# Patient Record
Sex: MALE | Race: WHITE | NOT HISPANIC OR LATINO | Employment: OTHER | URBAN - METROPOLITAN AREA
[De-identification: names, ages, dates, MRNs, and addresses within clinical notes are randomized per-mention and may not be internally consistent; named-entity substitution may affect disease eponyms.]

---

## 2018-12-17 ENCOUNTER — RECORDS - HEALTHEAST (OUTPATIENT)
Dept: LAB | Facility: HOSPITAL | Age: 83
End: 2018-12-17

## 2018-12-17 ENCOUNTER — RECORDS - HEALTHEAST (OUTPATIENT)
Dept: ADMINISTRATIVE | Facility: OTHER | Age: 83
End: 2018-12-17

## 2018-12-17 LAB
TSH SERPL DL<=0.005 MIU/L-ACNC: 1.17 UIU/ML (ref 0.3–5)
VIT B12 SERPL-MCNC: 456 PG/ML (ref 213–816)

## 2018-12-19 ENCOUNTER — RECORDS - HEALTHEAST (OUTPATIENT)
Dept: ADMINISTRATIVE | Facility: OTHER | Age: 83
End: 2018-12-19

## 2018-12-20 ENCOUNTER — AMBULATORY - HEALTHEAST (OUTPATIENT)
Dept: CARDIOLOGY | Facility: CLINIC | Age: 83
End: 2018-12-20

## 2018-12-20 LAB — METHYLMALONATE SERPL-SCNC: 0.2 UMOL/L (ref 0–0.4)

## 2019-01-16 ENCOUNTER — HOSPITAL ENCOUNTER (OUTPATIENT)
Dept: RADIOLOGY | Facility: HOSPITAL | Age: 84
Discharge: HOME | End: 2019-01-16
Attending: PSYCHIATRY & NEUROLOGY

## 2019-01-16 ENCOUNTER — HOSPITAL ENCOUNTER (OUTPATIENT)
Dept: MRI IMAGING | Facility: HOSPITAL | Age: 84
Discharge: HOME | End: 2019-01-16
Attending: INTERNAL MEDICINE

## 2019-01-16 ENCOUNTER — HOSPITAL ENCOUNTER (OUTPATIENT)
Dept: MRI IMAGING | Facility: HOSPITAL | Age: 84
Discharge: HOME | End: 2019-01-16
Attending: PSYCHIATRY & NEUROLOGY

## 2019-01-16 DIAGNOSIS — G47.52 REM SLEEP BEHAVIOR DISORDER: ICD-10-CM

## 2019-01-16 DIAGNOSIS — G62.89 AXONAL NEUROPATHY: ICD-10-CM

## 2019-01-16 DIAGNOSIS — G30.9 ALZHEIMER'S DEMENTIA (H): ICD-10-CM

## 2019-01-16 DIAGNOSIS — F02.80 ALZHEIMER'S DEMENTIA (H): ICD-10-CM

## 2020-06-04 PROBLEM — G62.89 AXONAL NEUROPATHY: Status: ACTIVE | Noted: 2020-06-04

## 2020-06-04 PROBLEM — G47.52 REM SLEEP BEHAVIOR DISORDER: Status: ACTIVE | Noted: 2020-06-04

## 2020-06-04 PROBLEM — G30.9 ALZHEIMER'S DISEASE (H): Status: ACTIVE | Noted: 2020-06-04

## 2020-06-04 PROBLEM — F02.80 ALZHEIMER'S DISEASE (H): Status: ACTIVE | Noted: 2020-06-04

## 2020-06-05 ENCOUNTER — OFFICE VISIT (OUTPATIENT)
Dept: NEUROLOGY | Facility: CLINIC | Age: 85
End: 2020-06-05
Payer: MEDICARE

## 2020-06-05 VITALS — HEIGHT: 73 IN | BODY MASS INDEX: 24.52 KG/M2 | WEIGHT: 185 LBS

## 2020-06-05 DIAGNOSIS — G62.89 AXONAL NEUROPATHY: Primary | ICD-10-CM

## 2020-06-05 DIAGNOSIS — G30.1 LATE ONSET ALZHEIMER'S DISEASE WITHOUT BEHAVIORAL DISTURBANCE (H): ICD-10-CM

## 2020-06-05 DIAGNOSIS — F02.80 LATE ONSET ALZHEIMER'S DISEASE WITHOUT BEHAVIORAL DISTURBANCE (H): ICD-10-CM

## 2020-06-05 PROBLEM — I95.1 ORTHOSTATIC HYPOTENSION: Status: ACTIVE | Noted: 2019-08-27

## 2020-06-05 PROBLEM — C34.92: Status: ACTIVE | Noted: 2017-07-19

## 2020-06-05 PROBLEM — I44.39 HIGH-GRADE ATRIOVENTRICULAR BLOCK: Status: ACTIVE | Noted: 2018-08-06

## 2020-06-05 PROBLEM — R00.1 BRADYCARDIA: Status: ACTIVE | Noted: 2020-06-05

## 2020-06-05 PROBLEM — R26.9 GAIT DISTURBANCE: Status: ACTIVE | Noted: 2019-08-26

## 2020-06-05 PROBLEM — R59.0 LYMPHADENOPATHY, THORACIC: Status: ACTIVE | Noted: 2018-06-21

## 2020-06-05 PROBLEM — R41.9 COGNITIVE COMPLAINTS: Status: ACTIVE | Noted: 2018-02-21

## 2020-06-05 PROBLEM — R56.9: Status: ACTIVE | Noted: 2020-06-05

## 2020-06-05 PROBLEM — Z95.0 PACEMAKER: Status: ACTIVE | Noted: 2017-12-26

## 2020-06-05 PROBLEM — R07.9 CHEST PAIN: Status: ACTIVE | Noted: 2018-02-21
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
## 2020-06-05 PROBLEM — R42 DIZZINESS: Status: ACTIVE | Noted: 2019-08-27

## 2020-06-05 PROBLEM — R55 SYNCOPE AND COLLAPSE: Status: ACTIVE | Noted: 2017-06-16

## 2020-06-05 PROBLEM — I48.91 ATRIAL FIBRILLATION (H): Status: ACTIVE | Noted: 2018-04-03

## 2020-06-05 PROBLEM — R91.8 LUNG NODULE, MULTIPLE: Status: ACTIVE | Noted: 2018-06-21

## 2020-06-05 PROCEDURE — 99214 OFFICE O/P EST MOD 30 MIN: CPT | Mod: 95 | Performed by: PSYCHIATRY & NEUROLOGY

## 2020-06-05 RX ORDER — PREGABALIN 100 MG/1
100 CAPSULE ORAL 3 TIMES DAILY
COMMUNITY
Start: 2019-05-22 | End: 2020-06-05

## 2020-06-05 RX ORDER — DONEPEZIL HYDROCHLORIDE 10 MG/1
10 TABLET, FILM COATED ORAL DAILY
COMMUNITY
Start: 2019-02-06 | End: 2020-06-05

## 2020-06-05 RX ORDER — DONEPEZIL HYDROCHLORIDE 10 MG/1
10 TABLET, FILM COATED ORAL DAILY
Qty: 90 TABLET | Refills: 3 | Status: SHIPPED | OUTPATIENT
Start: 2020-06-05 | End: 2021-05-10

## 2020-06-05 RX ORDER — PHENOL 1.4 %
10 AEROSOL, SPRAY (ML) MUCOUS MEMBRANE AT BEDTIME
COMMUNITY
End: 2023-01-01

## 2020-06-05 RX ORDER — SODIUM PHOSPHATE,MONO-DIBASIC 19G-7G/118
1 ENEMA (ML) RECTAL 2 TIMES DAILY
COMMUNITY
End: 2020-06-05

## 2020-06-05 RX ORDER — METOPROLOL TARTRATE 25 MG/1
25 TABLET, FILM COATED ORAL 2 TIMES DAILY
COMMUNITY
Start: 2020-01-22 | End: 2023-01-01

## 2020-06-05 RX ORDER — PREGABALIN 100 MG/1
100 CAPSULE ORAL 3 TIMES DAILY
Qty: 270 CAPSULE | Refills: 3 | Status: SHIPPED | OUTPATIENT
Start: 2020-06-05 | End: 2023-01-01

## 2020-06-05 RX ORDER — DORZOLAMIDE HYDROCHLORIDE AND TIMOLOL MALEATE 20; 5 MG/ML; MG/ML
1 SOLUTION/ DROPS OPHTHALMIC 2 TIMES DAILY
COMMUNITY

## 2020-06-05 ASSESSMENT — MIFFLIN-ST. JEOR: SCORE: 1588.03

## 2020-06-05 NOTE — NURSING NOTE
Chief Complaint   Patient presents with     Follow Up     6 month follow up, some new memory concerns (states difficult to remember medications and when to take them)      Video visit [phone]  Loki Jaime on 6/5/2020 at 9:24 AM

## 2020-06-05 NOTE — PROGRESS NOTES
"Video follow-up requested by patient video follow-up performed due to the COVID 19 pandemic  Patient identified     Telephone number  [phone]      .Patient is being evaluated via a billable video visit. The patient has been notified of following:     \"This video visit will be conducted via a call between you and your physician/provider. We have found that certain health care needs can be provided without the need for an in-person physical exam. This service lets us provide the care you need with a video conversation. If a prescription is necessary we can send it directly to your pharmacy. If lab work is needed we can place an order for that and you can then stop by our lab to have the test done at a later time.    If during the course of the call the physician/provider feels a video visit is not appropriate, you will not be charged for this service.    Physician has received verbal consent for a Video Visit from the patient? YES    Patient would like the video invitation sent by: Text [phone]    Sina      Video Visit Details    Type of service: Video Visit    Video Start Time: 9:40 AM    Video End Time (time video stopped): 10:05 AM    Originating Location (pt. Location): Patient's Home    Distant Location (provider location): [facility] [place]     Mode of Communication: Video Conference via dotHIV  /  Sina                  History of Present Illness     85-year-old here for follow-up of his memory difficulties     Patient has difficulty with neuropathy  It affects his gait and balance was reviewed at length about safety  Still ambulating without a cane  Is up out of the chair okay  Is carrying groceries up and down stairs with a do think is a good idea and we reviewed    Does have difficulty with dysesthesias and restless leg type symptoms which make it difficult to sleep much better with the Lyrica      Memory is about the same there is little bit about current events " but is sketchy on a lot of the details    No other major hospitalizations surgeries or illnesses since last seen    Does have some missing teeth which makes chewing difficult but no swallowing problems      Major difficulties today  When he standing in [employer] he gets a little lightheaded prior little bit of autonomic instability discussed with patient and family members    On Lyrica 100 mg 3 times daily for his neuropathy would not go up any higher on this    Otherwise relatively stable    History of memory fall off as far back as [id number]  Originally evaluated by Dr. 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 Lucia Beckford at [facility]  History of subdural hematoma from falling backwards in   Possible EEG in the past showing a right frontal slowing mild with possible right frontal sharp wave  Diagnosed with lung cancer in 2017    Past medical history  1. Onset of dementia cognitive difficulties as far back as   2. Diagnosed with dementia and   3. Axonal loss neuropathy going on for quite a few years more painful burning feet  4. Benign prostatic hypertrophy  5. Lung cancer 2017  6. Subdural hematoma     Family history  Sister with schizophrenia memory loss onset age 60s memory care unit  Another sister  of cancer  2 sisters  possibly of cancer is not sure  Brother with throat cancer  Dad with pneumonia  at age 55  Mother  in her 50s early 60s with heart attack next    Habits  Past smoker may have a rare alcoholic beverage          On today's visit we reviewed his neuropathy he has burning dysesthesias I do not think we should add any new medications he is switching his care to our service is already on Lyrica 100 mg 3 times daily I would not change that I would not go up on the dose if he is having side effects in the future for the meds we would have to decrease  the dose        He does exercise and finds that it makes his feet feel better this is good for his dementia though also he rides the exercise bike but then his feet get hot puts them in cold water but only for a few minutes warned him about frostbite or over freezing the feet which could make things worse he can use counter stimulation such as a stocking or massage to help if there are pins-and-needles but for the temperature changes be careful if he is using the cold water    Review of Systems   Patient is impulsive  He  uncomfortable sensations in his legs  Hearing loss on the left 70%    No diplopia no dysarthria no dysphasia  No focal weakness    Has some mild imbalance difficulties from his neuropathy and numb feet    No bowel or bladder complaints today but has had difficulty with prostate hypertrophy    No GERD no nausea vomiting no diarrhea    Has had some constipation in the past tries a well-hydrated    No actual syncopal episodes or lightheadedness    Denies headache chest pain shortness of breath nausea vomiting    The REM sleep changes are rare vivid dreams but no true hallucinations        Physical examination  Alert attentive  HEENT significant decreased hearing  Breathing well appears comfortable  Abdomen soft  No edema the feet    Neurologic exam  Alert and attentive  Year is 2019, then changed to 2018, then changed to 2020 he has answer questions quickly and is impulsive  Month is June  President is Milo Thorne  He knows something about the news he called the writers Sonia and they were doing moving easily with vague on some details though    Previous [place] score of 12 out of 30    Does okay with activities of daily living per his wife  Helps out more in the kitchen he does have his own checkbook but she monitors that    He is not driving at this time    Cranial 2 through 12 significant for  Marked decreased hearing on the left    No ophthalmoplegia no nystagmus face is symmetrical tongue  twisters are okay but he talks with an accent and is missing some teeth    Upper extremities  No drift  No tremor  Normal finger-nose    Gait and lower extremities  Gets out of the chair uses one-handed pushoff but gets up fairly quickly  Ambulates around the room without an assistive device  No specific gait apraxia                  Impression plan    Dementia on the Aricept no further autonomic problems or fainting continue with current dose    Neuropathy better with current medications    Follow-up in October or late fall 2020    No driving    Aricept 10 mg once per day    Lyrica 100 mg 3 times daily    Talked about good foot care    Follow-up in October 2020    29 his total care time today greater than 50% face-to-face discussion about multiple issues as above-medication side effects and benefits safety issues specially with gait problems and his impulsivity.

## 2020-06-05 NOTE — PATIENT INSTRUCTIONS
Patient Education     Alzheimer Disease  Alzheimer disease is a brain illness that can happen usually in older adults, but it can also happen as early as age 40. It is the most common cause of dementia. It is a progressive disease. This means it gets worse over time.  What is Alzheimer disease?  Alzheimer disease causes a series of changes to nerves of the brain. Some nerves form into clumps and tangles, and lose some of their connections to other nerves.  Healthcare providers don t fully understand what causes Alzheimer disease. But they think these may be some of the causes:    Age and family history    Certain genes    Abnormal protein deposits in the brain    Environmental factors    Problems with a person s immune system    Possibly infections  Symptoms of Alzheimer disease  The disease causes changes in behavior and thinking known as dementia. The symptoms include:    Memory loss    Confusion    Restlessness    Personality and behavior changes    Problems with judgment    Problems communicating with others    Inability to follow directions    Lack of emotion  Diagnosing Alzheimer disease  No single test is able to diagnose Alzheimer disease. Instead healthcare providers use a series of tests to rule out other health conditions. The tests may include:    A complete medical history. This may include questions about overall health and past health problems. The healthcare provider may ask how well the person can do daily tasks. The healthcare provider may ask family or close friends about any changes in behavior or personality.    Mental status test. This is a test of memory, problem solving, attention, counting, and language.     Standard medical tests. These may include blood and urine tests to find possible causes for the problem.    Brain imaging tests.  CT, MRI, or positron emission tomography (PET) may be used to rule out other causes of the problem.  Treating Alzheimer disease  Alzheimer disease has no  cure. Instead healthcare providers can help ease some symptoms. This can make a person with Alzheimer more comfortable. Treatment can also make it easier for their caregivers to take care of them.  Some medicines may help slow the decline of a person s memory, thinking, and language skills. They may help with problems of behavior, such as aggression. They can lessen hallucinations and delusions. These medicines can work for some but not all people. And they may help for only a limited time. Medicines include:    Cholinesterase inhibitors    Donepezil    Galantamine    Rivastigmine    Memantine  In some cases, behavior problems can be caused by medicine side effects. Talk with the person s healthcare provider about all medicines he or she is taking.  Keeping healthy  For a person with Alzheimer, it s important to stay healthy. Good nutrition and physical and social activity are vital. A calm and well-structured environment will help. Make sure to keep up with healthcare appointments and managing other health conditions, such as diabetes. Some people benefit from having a nutritionist help to prevent weight loss.    Caring for someone with Alzheimer  A person with Alzheimer will need more caregiving over time. Talk with your healthcare provider about caregiving resources.   Date Last Reviewed: 4/1/2018 2000-2019 [employer]. [address] [place] [address], [place] [address]. All rights reserved. This information is not intended as a substitute for professional medical care. Always follow your healthcare professional's instructions.

## 2020-06-05 NOTE — LETTER
"    6/5/2020         RE: Rosemary Farmer  [address]  [facility] [address]        Dear Colleague,    Thank you for referring your patient, Rosemary Farmer, to the [facility] [place]. Please see a copy of my visit note below.    Video follow-up requested by patient video follow-up performed due to the COVID 19 pandemic  Patient identified     Telephone number  [phone]      .Patient is being evaluated via a billable video visit. The patient has been notified of following:     \"This video visit will be conducted via a call between you and your physician/provider. We have found that certain health care needs can be provided without the need for an in-person physical exam. This service lets us provide the care you need with a video conversation. If a prescription is necessary we can send it directly to your pharmacy. If lab work is needed we can place an order for that and you can then stop by our lab to have the test done at a later time.    If during the course of the call the physician/provider feels a video visit is not appropriate, you will not be charged for this service.    Physician has received verbal consent for a Video Visit from the patient? YES    Patient would like the video invitation sent by: Text [phone]    Matchbook      Video Visit Details    Type of service: Video Visit    Video Start Time: 9:40 AM    Video End Time (time video stopped): 10:05 AM    Originating Location (pt. Location): Patient's Home    Distant Location (provider location): [facility]     Mode of Communication: Video Conference via [employer]  /  Matchbook                  History of Present Illness     85-year-old here for follow-up of his memory difficulties     Patient has difficulty with neuropathy  It affects his gait and balance was reviewed at length about safety  Still ambulating without a cane  Is up out of the chair okay  Is carrying groceries up and down stairs " with a do think is a good idea and we reviewed    Does have difficulty with dysesthesias and restless leg type symptoms which make it difficult to sleep much better with the Lyrica      Memory is about the same there is little bit about current events but is sketchy on a lot of the details    No other major hospitalizations surgeries or illnesses since last seen    Does have some missing teeth which makes chewing difficult but no swallowing problems      Major difficulties today  When he standing in [employer] he gets a little lightheaded prior little bit of autonomic instability discussed with patient and family members    On Lyrica 100 mg 3 times daily for his neuropathy would not go up any higher on this    Otherwise relatively stable    History of memory fall off as far back as [id number]  Originally evaluated by Dr. 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 Onset of dementia cognitive difficulties as far back as   2. Diagnosed with dementia and   3. Axonal loss neuropathy going on for quite a few years more painful burning feet  4. Benign prostatic hypertrophy  5. Lung cancer 2017  6. Subdural hematoma 2016    Family history  Sister with schizophrenia memory loss onset age 60s memory care unit  Another sister  of cancer  2 sisters  possibly of cancer is not sure  Brother with throat cancer  Dad with pneumonia  at age 55  Mother  in her 50s early 60s with heart attack next    Habits  Past smoker may have a rare alcoholic beverage          On today's visit we reviewed his neuropathy he has burning dysesthesias I do not think we  should add any new medications he is switching his care to our service is already on Lyrica 100 mg 3 times daily I would not change that I would not go up on the dose if he is having side effects in the future for the meds we would have to decrease the dose        He does exercise and finds that it makes his feet feel better this is good for his dementia though also he rides the exercise bike but then his feet get hot puts them in cold water but only for a few minutes warned him about frostbite or over freezing the feet which could make things worse he can use counter stimulation such as a stocking or massage to help if there are pins-and-needles but for the temperature changes be careful if he is using the cold water    Review of Systems   Patient is impulsive  He  uncomfortable sensations in his legs  Hearing loss on the left 70%    No diplopia no dysarthria no dysphasia  No focal weakness    Has some mild imbalance difficulties from his neuropathy and numb feet    No bowel or bladder complaints today but has had difficulty with prostate hypertrophy    No GERD no nausea vomiting no diarrhea    Has had some constipation in the past tries a well-hydrated    No actual syncopal episodes or lightheadedness    Denies headache chest pain shortness of breath nausea vomiting    The REM sleep changes are rare vivid dreams but no true hallucinations        Physical examination  Alert attentive  HEENT significant decreased hearing  Breathing well appears comfortable  Abdomen soft  No edema the feet    Neurologic exam  Alert and attentive  Year is 2019, then changed to 2018, then changed to 2020 he has answer questions quickly and is impulsive  Month is June  President is Milo Thorne  He knows something about the news he called the writers Sonia and they were doing moving easily with vague on some details though    Previous [place] score of 12 out of 30    Does okay with activities of daily living per his wife  Helps out  more in the kitchen he does have his own checkbook but she monitors that    He is not driving at this time    Cranial 2 through 12 significant for  Marked decreased hearing on the left    No ophthalmoplegia no nystagmus face is symmetrical tongue twisters are okay but he talks with an accent and is missing some teeth    Upper extremities  No drift  No tremor  Normal finger-nose    Gait and lower extremities  Gets out of the chair uses one-handed pushoff but gets up fairly quickly  Ambulates around the room without an assistive device  No specific gait apraxia                  Impression plan    Dementia on the Aricept no further autonomic problems or fainting continue with current dose    Neuropathy better with current medications    Follow-up in October or late fall 2020    No driving    Aricept 10 mg once per day    Lyrica 100 mg 3 times daily    Talked about good foot care    Follow-up in October 2020    29 his total care time today greater than 50% face-to-face discussion about multiple issues as above-medication side effects and benefits safety issues specially with gait problems and his impulsivity.        Again, thank you for allowing me to participate in the care of your patient.        Sincerely,        Tomi Hill MD

## 2020-09-18 ENCOUNTER — TELEPHONE (OUTPATIENT)
Dept: NEUROLOGY | Facility: CLINIC | Age: 85
End: 2020-09-18

## 2020-09-18 NOTE — TELEPHONE ENCOUNTER
Prior Authorization Retail Medication Request    Medication/Dose: Pregablin 100mg caps take 1 capsule PO TID  ICD code (if different than what is on RX):  [G62.89]  Previously Tried and Failed:    Rationale:      Insurance Name:    Insurance ID:        Pharmacy Information (if different than what is on RX)  Name:  [facility]   Phone:  [phone]    Loki Jaime on 9/18/2020 at 3:10 PM

## 2020-09-21 NOTE — TELEPHONE ENCOUNTER
PA DENIED   If provider would like to appeal we will need a detailed letter of medical necessity to start the process.   Otherwise please notify the patient and close the encounter.   Thank you!   Olya       Please advise. Letter can be typed here in chart   Loki Jaime on 9/21/2020 at 10:49 AM

## 2020-09-21 NOTE — TELEPHONE ENCOUNTER
To whom it may concern      Rosemary Farmer has a diagnosis of    Axonal neuropathy with painful burning feet.    He had significant dysesthesias of the lower extremities and feet    Patient has failed multiple treatments in the past    Failed Cymbalta  Failed gabapentin  Failed topical compounded product with ketamine clonidine gabapentin and Lidoderm mixture    Has used Lyrica 100 mg 3 times daily with good success controlling his neuropathic pain    Has been on the above medication for greater than a year    I feel that the patient should continue with the current treatment for his painful neuropathic neuropathy.  The above recommendation for Lyrica 100 g 3 times daily is not a new treatment and he has been on this successfully after failing multiple other treatments in the past.    Due to the patient's age and other complicating medical factors and being on Eliquis as well as other medications decreasing medication interactions is also important.    Tomi Hill MD on 9/21/2020 at 11:55 AM

## 2020-09-21 NOTE — TELEPHONE ENCOUNTER
Central Prior Authorization Team   Phone: [phone]      PRIOR AUTHORIZATION DENIED    Medication: pregabalin (LYRICA) 100 MG capsule - DENIED    Denial Date: 9/18/2020    Denial Rational:     Appeal Information:

## 2020-09-21 NOTE — TELEPHONE ENCOUNTER
Central Prior Authorization Team   Phone: [phone]      Medication Appeal Initiation    We have initiated an appeal for the requested medication:  Medication: pregabalin (LYRICA) 100 MG capsule - APPEAL INITIATED  Appeal Start Date:  9/21/2020  Insurance Company: [employer] Part D - Phone [phone] Fax [phone]  Comments:  Appeal initiated with letter of medical necessity.    Manually faxed to insurance @ [phone].

## 2020-09-29 NOTE — TELEPHONE ENCOUNTER
Central Prior Authorization Team   Phone: [phone]      MEDICATION APPEAL APPROVED    Medication: pregabalin (LYRICA) 100 MG capsule - APPEAL APPROVED  Authorization Effective Date: 06/24/2020   Authorization Expiration Date: 09/22/2021   Approved Dose/Quantity: 270 FOR 90  Reference #:     Insurance Company: [employer] Part D - Phone [phone] Fax [phone]  Expected CoPay: $141.00      CoPay Card Available:      Foundation Assistance Needed:    Which Pharmacy is filling the prescription (Not needed for infusion/clinic administered): [id number] [address]    Verbal approval given by Joey Frias representative.    Pharmacy received a paid claim and will notify pt when ready for .

## 2020-10-08 PROBLEM — K31.819 ANGIODYSPLASIA OF DUODENUM: Status: ACTIVE | Noted: 2020-09-21

## 2020-10-09 ENCOUNTER — VIRTUAL VISIT (OUTPATIENT)
Dept: NEUROLOGY | Facility: CLINIC | Age: 85
End: 2020-10-09
Payer: MEDICARE

## 2020-10-09 VITALS — WEIGHT: 183 LBS | HEIGHT: 75 IN | BODY MASS INDEX: 22.75 KG/M2

## 2020-10-09 DIAGNOSIS — G62.89 AXONAL NEUROPATHY: ICD-10-CM

## 2020-10-09 DIAGNOSIS — F02.80 LATE ONSET ALZHEIMER'S DISEASE WITHOUT BEHAVIORAL DISTURBANCE (H): Primary | ICD-10-CM

## 2020-10-09 DIAGNOSIS — G30.1 LATE ONSET ALZHEIMER'S DISEASE WITHOUT BEHAVIORAL DISTURBANCE (H): Primary | ICD-10-CM

## 2020-10-09 PROCEDURE — 99214 OFFICE O/P EST MOD 30 MIN: CPT | Mod: 95 | Performed by: PSYCHIATRY & NEUROLOGY

## 2020-10-09 RX ORDER — FERROUS SULFATE 325(65) MG
325 TABLET ORAL 2 TIMES DAILY
COMMUNITY
Start: 2020-09-21 | End: 2021-09-21

## 2020-10-09 ASSESSMENT — MIFFLIN-ST. JEOR: SCORE: 1610.71

## 2020-10-09 NOTE — PROGRESS NOTES
"Video follow-up requested by patient video follow-up performed due to the COVID 19 pandemic  Patient identified   Telephone number  [phone]      .Patient is being evaluated via a billable video visit. The patient has been notified of following:     \"This video visit will be conducted via a call between you and your physician/provider. We have found that certain health care needs can be provided without the need for an in-person physical exam. This service lets us provide the care you need with a video conversation. If a prescription is necessary we can send it directly to your pharmacy. If lab work is needed we can place an order for that and you can then stop by our lab to have the test done at a later time.    If during the course of the call the physician/provider feels a video visit is not appropriate, you will not be charged for this service.    Physician has received verbal consent for a Video Visit from the patient? YES    Patient would like the video invitation sent by: Text [phone]    XYZE      Video Visit Details    Type of service: Video Visit    Video Start Time: 12:50 AM    Video End Time (time video stopped): 1:26 PM    Originating Location (pt. Location): Patient's Home    Distant Location (provider location): [facility] [place]     Mode of Communication: Video Conference via [employer]  /  XYZE                  History of Present Illness     85-year-old here for follow-up of his memory difficulties     Patient has difficulty with neuropathy  It affects his gait and balance was reviewed at length about safety  Still ambulating without a cane  Is up out of the chair okay  Is carrying groceries up and down stairs with a do think is a good idea and we reviewed    Does have difficulty with dysesthesias and restless leg type symptoms which make it difficult to sleep much better with the Lyrica      Memory is about the same there is little bit about current events but " is sketchy on a lot of the details    No other major hospitalizations surgeries or illnesses since last seen    Does have some missing teeth which makes chewing difficult but no swallowing problems      Major difficulties today  When he standing in [employer] he gets a little lightheaded prior little bit of autonomic instability discussed with patient and family members    On Lyrica 100 mg 3 times daily for his neuropathy would not go up any higher on this    Otherwise relatively stable    Since last seen has been relatively stable  Did have a problem though where he dropped his hemoglobin from 13 down to 10  He is on Eliquis  Had a endoscopy and a colonoscopy  GI thought maybe there is some bleeding in the small intestine  Has been to have a pill camera  They are tracking his hemoglobin closely    No nausea no vomiting no GERD  No diarrhea  Times gets lightheaded if he gets up quick but his wife says he moves too fast most the time if he moves slowly do better  Does not need a cane for gait  Talked about gait safety low at length and especially with winter coming up  Likes to watch a lot of politics he says he does not get frustrated with that    No other new medications    Talked about the side effects of Aricept including but not limited to bradycardia which can cause lightheadedness sinus can cause ulcers and GI difficulties and should be aware of those 2        History of memory fall off as far back as [id number]  Originally evaluated by Dr. 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 Lucia Beckford at health [facility]  History of subdural hematoma from falling backwards in 2016  Possible EEG in the past showing a right frontal slowing mild with possible right frontal sharp wave  Diagnosed with lung cancer in August 2017    Past medical history  1. Onset of dementia cognitive difficulties as far back as 2011  2. Diagnosed  with dementia and 2014  3. Axonal loss neuropathy going on for quite a few years more painful burning feet  4. Benign prostatic hypertrophy  5. Lung cancer 2017  6. Subdural hematoma 2016    Family history  Sister with schizophrenia memory loss onset age 60s memory care unit  Another sister  of cancer  2 sisters  possibly of cancer is not sure  Brother with throat cancer  Dad with pneumonia  at age 55  Mother  in her 50s early 60s with heart attack next    Habits  Past smoker may have a rare alcoholic beverage          On today's visit we reviewed his neuropathy he has burning dysesthesias I do not think we should add any new medications he is switching his care to our service is already on Lyrica 100 mg 3 times daily I would not change that I would not go up on the dose if he is having side effects in the future for the meds we would have to decrease the dose        He does exercise and finds that it makes his feet feel better this is good for his dementia though also he rides the exercise bike but then his feet get hot puts them in cold water but only for a few minutes warned him about frostbite or over freezing the feet which could make things worse he can use counter stimulation such as a stocking or massage to help if there are pins-and-needles but for the temperature changes be careful if he is using the cold water      Exam review  Review of Systems   Patient is impulsive  He  uncomfortable sensations in his legs  Hearing loss on the left 70%    No diplopia no dysarthria no dysphasia  No focal weakness    Has some mild imbalance difficulties from his neuropathy and numb feet    No bowel or bladder complaints today but has had difficulty with prostate hypertrophy    No GERD no nausea vomiting no diarrhea    Has had some constipation in the past tries a well-hydrated    No actual syncopal episodes or lightheadedness    Denies headache chest pain shortness of breath nausea vomiting    The  REM sleep changes are rare vivid dreams but no true hallucinations        Physical examination  Alert attentive  HEENT significant decreased hearing  Breathing well appears comfortable  Abdomen soft  No edema the feet    Neurologic exam  Alert and attentive  Year is  2020 he has answer questions quickly and is impulsive  Month is October  President is Milo Thorne  He knows something about the news     Previous [place] score of 12 out of 30    Does okay with activities of daily living per his wife  Helps out more in the kitchen he does have his own checkbook but she monitors that    He is not driving at this time    Cranial 2 through 12 significant for  Marked decreased hearing on the left    No ophthalmoplegia no nystagmus face is symmetrical tongue twisters are okay but he talks with an accent and is missing some teeth    Upper extremities  No drift  No tremor  Normal finger-nose    Gait and lower extremities  Gets out of the chair uses one-handed pushoff but gets up fairly quickly  Ambulates around the room without an assistive device  No specific gait apraxia                  Impression plan    Dementia on the Aricept no further autonomic problems or fainting continue with current dose    Neuropathy better with current medications    Follow-up in May 2021    No driving    Aricept 10 mg once per day    Lyrica 100 mg 3 times daily, getting through [employer] through paperwork filled out by his primary    Talked about good foot care  Talked about good gait safety  Patient very hard of hearing which does limit some of the cognitive testing  Primary GI will watch his hemoglobin he is on the Eliquis and is being worked up for possible GI bleed    30 minutes total care time today greater than 50% face-to-face patient and wife discussing the above

## 2020-10-09 NOTE — LETTER
"    10/9/2020         RE: Rosemary Farmer  [address]  [facility] [address]        Dear Colleague,    Thank you for referring your patient, Rosemary Farmer, to the [facility] [place]. Please see a copy of my visit note below.    Video follow-up requested by patient video follow-up performed due to the COVID 19 pandemic  Patient identified   Telephone number  [phone]      .Patient is being evaluated via a billable video visit. The patient has been notified of following:     \"This video visit will be conducted via a call between you and your physician/provider. We have found that certain health care needs can be provided without the need for an in-person physical exam. This service lets us provide the care you need with a video conversation. If a prescription is necessary we can send it directly to your pharmacy. If lab work is needed we can place an order for that and you can then stop by our lab to have the test done at a later time.    If during the course of the call the physician/provider feels a video visit is not appropriate, you will not be charged for this service.    Physician has received verbal consent for a Video Visit from the patient? YES    Patient would like the video invitation sent by: Text [phone]    [employer]      Video Visit Details    Type of service: Video Visit    Video Start Time: 12:50 AM    Video End Time (time video stopped): 1:26 PM    Originating Location (pt. Location): Patient's Home    Distant Location (provider location): [facility] [place]     Mode of Communication: Video Conference via [employer]  /  [employer]                  History of Present Illness     85-year-old here for follow-up of his memory difficulties     Patient has difficulty with neuropathy  It affects his gait and balance was reviewed at length about safety  Still ambulating without a cane  Is up out of the chair okay  Is carrying groceries up and down " stairs with a do think is a good idea and we reviewed    Does have difficulty with dysesthesias and restless leg type symptoms which make it difficult to sleep much better with the Lyrica      Memory is about the same there is little bit about current events but is sketchy on a lot of the details    No other major hospitalizations surgeries or illnesses since last seen    Does have some missing teeth which makes chewing difficult but no swallowing problems      Major difficulties today  When he standing in [employer] he gets a little lightheaded prior little bit of autonomic instability discussed with patient and family members    On Lyrica 100 mg 3 times daily for his neuropathy would not go up any higher on this    Otherwise relatively stable    Since last seen has been relatively stable  Did have a problem though where he dropped his hemoglobin from 13 down to 10  He is on Eliquis  Had a endoscopy and a colonoscopy  GI thought maybe there is some bleeding in the small intestine  Has been to have a pill camera  They are tracking his hemoglobin closely    No nausea no vomiting no GERD  No diarrhea  Times gets lightheaded if he gets up quick but his wife says he moves too fast most the time if he moves slowly do better  Does not need a cane for gait  Talked about gait safety low at length and especially with winter coming up  Likes to watch a lot of politics he says he does not get frustrated with that    No other new medications    Talked about the side effects of Aricept including but not limited to bradycardia which can cause lightheadedness sinus can cause ulcers and GI difficulties and should be aware of those 2        History of memory fall off as far back as [id number]  Originally evaluated by Dr. 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 Lucia Beckford at [facility]  History of subdural hematoma from falling  backwards in 2016  Possible EEG in the past showing a right frontal slowing mild with possible right frontal sharp wave  Diagnosed with lung cancer in 2017    Past medical history  1. Onset of dementia cognitive difficulties as far back as   2. Diagnosed with dementia and   3. Axonal loss neuropathy going on for quite a few years more painful burning feet  4. Benign prostatic hypertrophy  5. Lung cancer 2017  6. Subdural hematoma     Family history  Sister with schizophrenia memory loss onset age 60s memory care unit  Another sister  of cancer  2 sisters  possibly of cancer is not sure  Brother with throat cancer  Dad with pneumonia  at age 55  Mother  in her 50s early 60s with heart attack next    Habits  Past smoker may have a rare alcoholic beverage          On today's visit we reviewed his neuropathy he has burning dysesthesias I do not think we should add any new medications he is switching his care to our service is already on Lyrica 100 mg 3 times daily I would not change that I would not go up on the dose if he is having side effects in the future for the meds we would have to decrease the dose        He does exercise and finds that it makes his feet feel better this is good for his dementia though also he rides the exercise bike but then his feet get hot puts them in cold water but only for a few minutes warned him about frostbite or over freezing the feet which could make things worse he can use counter stimulation such as a stocking or massage to help if there are pins-and-needles but for the temperature changes be careful if he is using the cold water      Exam review  Review of Systems   Patient is impulsive  He  uncomfortable sensations in his legs  Hearing loss on the left 70%    No diplopia no dysarthria no dysphasia  No focal weakness    Has some mild imbalance difficulties from his neuropathy and numb feet    No bowel or bladder complaints today but has had  difficulty with prostate hypertrophy    No GERD no nausea vomiting no diarrhea    Has had some constipation in the past tries a well-hydrated    No actual syncopal episodes or lightheadedness    Denies headache chest pain shortness of breath nausea vomiting    The REM sleep changes are rare vivid dreams but no true hallucinations        Physical examination  Alert attentive  HEENT significant decreased hearing  Breathing well appears comfortable  Abdomen soft  No edema the feet    Neurologic exam  Alert and attentive  Year is  2020 he has answer questions quickly and is impulsive  Month is October  President is Milo Thorne  He knows something about the news     Previous [place] score of 12 out of 30    Does okay with activities of daily living per his wife  Helps out more in the kitchen he does have his own checkbook but she monitors that    He is not driving at this time    Cranial 2 through 12 significant for  Marked decreased hearing on the left    No ophthalmoplegia no nystagmus face is symmetrical tongue twisters are okay but he talks with an accent and is missing some teeth    Upper extremities  No drift  No tremor  Normal finger-nose    Gait and lower extremities  Gets out of the chair uses one-handed pushoff but gets up fairly quickly  Ambulates around the room without an assistive device  No specific gait apraxia                  Impression plan    Dementia on the Aricept no further autonomic problems or fainting continue with current dose    Neuropathy better with current medications    Follow-up in May 2021    No driving    Aricept 10 mg once per day    Lyrica 100 mg 3 times daily, getting through [employer] through paperwork filled out by his primary    Talked about good foot care  Talked about good gait safety  Patient very hard of hearing which does limit some of the cognitive testing  Primary GI will watch his hemoglobin he is on the Eliquis and is being worked up for possible GI bleed    30 minutes  total care time today greater than 50% face-to-face patient and wife discussing the above            Again, thank you for allowing me to participate in the care of your patient.        Sincerely,        Tomi Hill MD

## 2020-10-09 NOTE — NURSING NOTE
Chief Complaint   Patient presents with     Follow Up     2 month Memory follow up-patient states that his memory has been the same and he would like to discuss meds/management of the bi lateral foot neuropathy      [facility] Visit [facility] [phone]  Loki Jaime CMA on 10/9/2020 at 12:44 PM

## 2021-05-10 ENCOUNTER — OFFICE VISIT (OUTPATIENT)
Dept: NEUROLOGY | Facility: CLINIC | Age: 86
End: 2021-05-10
Payer: COMMERCIAL

## 2021-05-10 VITALS
WEIGHT: 184 LBS | BODY MASS INDEX: 22.88 KG/M2 | SYSTOLIC BLOOD PRESSURE: 179 MMHG | DIASTOLIC BLOOD PRESSURE: 61 MMHG | HEART RATE: 86 BPM | HEIGHT: 75 IN

## 2021-05-10 DIAGNOSIS — F02.80 LATE ONSET ALZHEIMER'S DISEASE WITHOUT BEHAVIORAL DISTURBANCE (H): ICD-10-CM

## 2021-05-10 DIAGNOSIS — G30.1 LATE ONSET ALZHEIMER'S DISEASE WITHOUT BEHAVIORAL DISTURBANCE (H): ICD-10-CM

## 2021-05-10 PROCEDURE — 99215 OFFICE O/P EST HI 40 MIN: CPT | Performed by: PSYCHIATRY & NEUROLOGY

## 2021-05-10 RX ORDER — DONEPEZIL HYDROCHLORIDE 10 MG/1
10 TABLET, FILM COATED ORAL DAILY
Qty: 90 TABLET | Refills: 3 | Status: SHIPPED | OUTPATIENT
Start: 2021-05-10 | End: 2021-12-28

## 2021-05-10 RX ORDER — ASPIRIN 81 MG
1 TABLET, DELAYED RELEASE (ENTERIC COATED) ORAL
COMMUNITY
End: 2023-01-01

## 2021-05-10 ASSESSMENT — MONTREAL COGNITIVE ASSESSMENT (MOCA)
10. [FLUENCY] NAME WORDS STARTING WITH DESIGNATED LETTER: 0
7. [VIGILENCE] TAP WHEN HEARING DESIGNATED LETTER: 1
VISUOSPATIAL/EXECUTIVE SUBSCORE: 1
4. NAME EACH OF THE THREE ANIMALS SHOWN: 0
13. ORIENTATION SUBSCORE: 2
WHAT LEVEL OF EDUCATION WAS ATTAINED: 0
11. FOR EACH PAIR OF WORDS, WHAT CATEGORY DO THEY BELONG TO (OUT OF 2): 1
6. READ LIST OF DIGITS [FORWARD/BACKWARD]: 1
9. REPEAT EACH SENTENCE: 1
8. SERIAL SUBTRACTION OF 7S: 1
WHAT IS THE TOTAL SCORE (OUT OF 30): 8
12. MEMORY INDEX SCORE: 0

## 2021-05-10 ASSESSMENT — MIFFLIN-ST. JEOR: SCORE: 1602.31

## 2021-05-10 NOTE — PROGRESS NOTES
11/19/2019, in person visit  6/5/2020, video visit  10/9/2020, video visit  5/10/2021, in person visit      History of Present Illness     86-year-old year for follow-up with neurologic difficulties of:      1.  History of memory fall off as far back as [id number]       Originally evaluated by Dr. Jose Beckford 2014 for dementia was on Aricept  2.  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 Lucia Beckford at [facility]  3.  History of subdural hematoma from falling backwards in 2016       Possible EEG in the past showing a right frontal slowing mild with possible right frontal sharp wave  5.  Diagnosed with lung cancer in August 2017  6.  REM sleep disorder symptoms    As part of this visit  Reviewed primary MD notes  4/29/2021 4/16/2021  And some previous notes also  Cardiology notes 3/9/2021 reviewed  Labs reviewed    Recent labs April 5, 2021  White blood count 8.1, hemoglobin 13.5, platelets are 50,000  Sodium 143 potassium 4.8  BUN 19 creatinine 0.9  Glucose 116  TSH 1.19, B12 433 (February 2021)    Since last evaluated on video October 9, 2020  No hospitalizations  No surgeries  No Covid illness  Did have a ear infection      Patient also had a note from his primary for me to review  Concerned that with the slow progression of his dementia whether this is something different than Alzheimer's  There is been a debate with the REM sleep difficulty in the beginning whether it was Lewy body  He seems to have a slow course so it could be 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  It could be a combination of the above  Could be a atypical Alzheimer's  I do not feel that further testing for tile or amyloid with special scans would change the way we treat thanks  Did have a discussion with patient and family about this      Patient has difficulty with neuropathy  It affects his gait and balance was reviewed at length about safety  Still  ambulating without a cane although said that when he is outside sometimes he might use a walking stick  Is up out of the chair okay  Is carrying groceries up and down stairs which I do not  think is a good idea and we reviewed    Does have difficulty with dysesthesias and restless leg type symptoms which make it difficult to sleep much better with the Lyrica    Does have some missing teeth which makes chewing difficult but no swallowing problems    When he standing in [place] he gets a little lightheaded prior little bit of autonomic instability discussed with patient and family members    On Lyrica 100 mg 3 times daily for his neuropathy would not go up any higher on this    On today's visit we reviewed his neuropathy he has burning dysesthesias I do not think we should add any new medications he is switching his care to our service is already on Lyrica 100 mg 3 times daily I would not change that I would not go up on the dose if he is having side effects in the future for the meds we would have to decrease the dose    He does exercise and finds that it makes his feet feel better this is good for his dementia though also he rides the exercise bike but then his feet get hot puts them in cold water but only for a few minutes warned him about frostbite or over freezing the feet which could make things worse he can use counter stimulation such as a stocking or massage to help if there are pins-and-needles but for the temperature changes be careful if he is using the cold water      Patient lives with wife  Rediscussed that I did not think it was a good idea that he drives  Reviewed the 2 driving test 5/10/2019, and February 15, 2019.  He is driving under special circumstances no highway only during daytime limited routes  Drives to the gas station  [facility]  I still feel that it be better that he bows out from driving and rediscussed my concerns    Currently does activities of daily living okay gets  cleaned up dressed changes  Eats okay swallows okay  With neuropathy has difficulty ambulating on uneven surfaces  Sometimes gets lightheaded if he gets up too quick  Does not seem to have the REM sleep difficulties now on the melatonin  May have some vivid dreams but cannot really remember them  No true hallucinations  Does help with some chores around the house wife does the cooking        Since last seen has been relatively stable  Did have a problem though where he dropped his hemoglobin from 13 down to 10  He is on Eliquis  Had a endoscopy and a colonoscopy  GI thought maybe there is some bleeding in the small intestine  Has been to have a pill camera  They are tracking his hemoglobin closely    No nausea no vomiting no GERD  No diarrhea  Times gets lightheaded if he gets up quick but his wife says he moves too fast most the time if he moves slowly do better  Does not need a cane for gait  Talked about gait safety low at length and especially with winter coming up  Likes to watch a lot of politics he says he does not get frustrated with that    No other new medications    Talked about the side effects of Aricept including but not limited to bradycardia which can cause lightheadedness sinus can cause ulcers and GI difficulties and should be aware of those too            Past neurologic  history  1. Onset of dementia cognitive difficulties as far back as 2011  2. Diagnosed with dementia and 2014  3. Axonal loss neuropathy going on for quite a few years more painful burning feet  4. Benign prostatic hypertrophy  5. Lung cancer August 2017  6. Subdural hematoma 2016  7.  REM sleep symptoms  8.  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  Subdural hematoma, 2016  Neuropathy  Atrial fibrillation  Pacemaker  Lung cancer, 2017  GERD, angiodysplasia of the duodenum  BPH  Glaucoma, cataracts  Hearing  loss  Arthritis          Habits  Past smoker   May have a rare alcoholic beverage 1-2 times per month    Family history  Sister with schizophrenia memory loss onset age 60s memory care unit  Another sister  of cancer (question breast)  2 sisters  possibly of cancer is not sure  Brother with throat cancer  Dad with pneumonia  at age 55, lung cancer  Mother  in her 50s early 60s with heart attack             Current summary of past workup:    1. MRI scan of the brain in , mild atrophy, mild small vessel changes.  2. MRI of the cervical spine in , no significant cord impingement done at [facility].  3. Reference is to normal TSH and B12, and glucoses in the past, but I do not have all the numbers.  4. MRI scan in , slightly increased ventricles greater than atrophy, question NPH, small vessel changes, but gait is not consistent with NPH.              MRI scan head, 2019,              Unchanged lateral and third ventriculomegaly disproportionate to degree of sulci prominence              Volume loss bilateral hippocampal left greater than the right              Mild chronic small vessel changes  5. Reference to an EEG in the past showing right frontal slowing, mild with some questionable right frontal sharp wave.              EEG 2019, 9-10 Hz posterior dominant rhythm subtle theta disorganization no epileptiform activity  6. Mini-mental status score , 2017.  7. Neuropsychometric testing formal in the past, May 15, 2017 compared to  showing severe changes with:  a. Processing speed severely impaired.              b. Visuospatial severely impaired.              c. Memory is moderately impaired.              d. Executive functioning decreased compared to .  8.        MoCA            2018, 12 out of 30            5/10/2021, 8 out of 30    9.       's evaluation 2019 from [facility] stated patient should not drive            's  evaluation [facility] may 2019 stated patient should not drive  10.     B12 433, TSH 1.19, (February 2021)      Exam review  Review of systems per HPI  Pertinent positives  Hearing loss of at least 70% on the left  Dysesthesias of the legs from his neuropathy  Balance trouble because of the neuropathy    No headache no chest pain no shortness of breath  No diplopia dysarthria dysphagia  No new focal weakness      Review of Systems   Patient is impulsive  He  uncomfortable sensations in his legs  Hearing loss on the left 70%    No diplopia no dysarthria no dysphasia  No focal weakness    No bowel or bladder difficulties that are new  No black tarry stools no blood in the stool no ABIEL    The REM sleep changes are rare vivid dreams but no true hallucinations    Otherwise review of systems negative    General exam  Blood pressure 179/61, pulse 86  HEENT significant for significant hearing loss  Lungs clear  Heart rate regular  Abdomen soft positive bowel sounds  Symmetrical pulses  No edema in the feet    Neurologic exam   alert and attentive  Formal Kay 8 out of 30 but is hard of hearing  Past MoCA 12 out of 30 2018    No aphasia  No neglect    Cranials 2 through 12 significant for  Marked decreased hearing  No ophthalmoplegia  No nystagmus  Visual fields intact  Face symmetrical      Upper extremities  No drift  No tremor  Normal finger-nose    Gait and lower extremities  Gets out of the chair uses one-handed pushoff but gets up fairly quickly  Ambulates around the room without an assistive device  No specific gait apraxia    Would do better with a cane or walking stick            Impression plan    1.  Alzheimer's dementia ( G30.1 )        Aricept 10 mg once per day        History of some mild lightheadedness/autonomic problems monitoring        Has some history of mild REM sleep changes not severe        Melatonin 10 mg once at nighttime for REM sleep difficulties       2 driving evaluations in spring 2019  stated patient should be a nondriver.       MoCA 12 out of 30, December 2018.       MoCA 8 out of 30, May 10, 2021      We discussed actual type of dementia could be an atypical Alzheimer's to the slow course could have a component of chronic traumatic encephalopathy did have some REM sleep difficulties but does not seem to be typical of Lewy body as they seem to be easier to control and less bad now discussed that further testing with special scans would not change the treatment at this time  Dementia on the Aricept no further autonomic problems or fainting continue with current dose    2.  Axonal neuropathy ( G62.89 )       Has neuropathic pain       Better controlled with Lyrica 100 mg p.o. 3 times daily      3.   Previous MRI scans with ventricles out of proportion to atrophy, static hydrocephalus        Has not had typical gait did not feel he was a good shunt candidate        Has hippocampal atrophy left greater than right on MRI        MoCA 12 out of 30 in 2018.    4.  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 )       Melatonin 10 mg at nighttime      Plan  No driving    Aricept 10 mg once per day    Lyrica 100 mg 3 times daily, getting through [employer] through paperwork filled out by his primary    Melatonin 10 mg once per day    Follow-up in October 2021    Talked about good foot care  Talked about good gait safety  Patient very hard of hearing which does limit some of the cognitive testing  Primary GI will watch his hemoglobin he is on the Eliquis and is being worked up for possible GI bleed      As part of the visit today  Reviewed past EMR notes  Reviewed primary MD note 4/29/2021, 4/16/2021 as well as others  Reviewed cardiology notes 3/9/2021  Labs reviewed April and February 21      Total care time today 58 minutes  Extensive discussion about 2 separate problems neuropathy and dementia  Reviewed outside records and relationship to the above discussion

## 2021-05-10 NOTE — NURSING NOTE
SHANIQUA COGNITIVE ASSESSMENT (MOCA)  Version 7.1 Original Version  VISUOSPATIAL/EXECUTIVE               COPY CUBE      [ 0   ]                                [   0 ] DRAW CLOCK (Ten past eleven)  (3 points)    [  1  ]                    [  0  ]               [  0  ]       Contour            Numbers     Hands POINTS                  1 / 5   NAMING    [ 0  ]                                                                        [  0 ]                                             [   0 ]  Licharlie James                                Camel                   0  / 3   MEMORY Read list of words, subject must repeat them. Do 2 trials, even if 1st trial is successful. Do a recall after 5 minutes  FACE VELVET Mandaeism PHILLIP RED No Points    1st          2nd         ATTENTION Read list of digits (1 digit/sec) Subject has to repeat in the forward order       [  1  ]   2  1  8  5  4                                [   0 ] 7 4 2                         1 /2   Read list of letters. The subject must tap with his hand at each letter A. No points if > 2 errors.  [ 1 ] F B A C M N A A J K L B A F A K D E A A A J A M O F A A B             1 /1   Serial 7 subtraction starting at 100          [  1  ] 93         [  0  ] 86          [  0  ] 79          [  0  ] 72         [   0 ] 65   4 or 5 correct subtractions: 3 points,  2 or 3 correct: 2 points,  1correct: 1 point,   0 correct: 0 points           1 /3   LANGUAGE Repeat: I only know that Osiel is the one to help today. [   1  ]                                      The cat always hid under the couch when dogs were in the room. [ 0  ]              1 /2   Fluency: Name maximum number of words in one minute that begin with the letter F                                                                                                                    [ 0   ] ___ (N > 11 words)              0 /1   ABSTRACTION Similarity  between e.g. banana-orange=fruit                                                                   [  1  ] train-bicycle                      [  0  ] watch-ruler             1 /2   DELAYED  RECALL Has to recall words  WITH NO CUE FACE  [  0] VELVET  [    0 Congregation  [   0 ]  PHILLIP  [    0] RED  [   0 ] Points for UNCUED recall only            0/5           OPTIONAL Category cue           Multiple choice cue          ORIENTATION  [   0 ] Date     [   0 ] Month       [ 0  ] Year      [  1  ] Day      [  0 ] Place        [ 0   ] City         2 /6   TOTAL  Normal > 26/30 Add 1 point if < 12 years education      8 /30

## 2021-05-10 NOTE — LETTER
5/10/2021         RE: Rosemary Farmer  [address]  [place] [address]        Dear Colleague,    Thank you for referring your patient, Rosemary Farmer, to the [facility] [place]. Please see a copy of my visit note below.        11/19/2019, in person visit  6/5/2020, video visit  10/9/2020, video visit  5/10/2021, in person visit      History of Present Illness     86-year-old year for follow-up with neurologic difficulties of:      1.  History of memory fall off as far back as [id number]       Originally evaluated by Dr. Jose Beckford 2014 for dementia was on Aricept  2.  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 Lucia Beckford at [facility]  3.  History of subdural hematoma from falling backwards in 2016       Possible EEG in the past showing a right frontal slowing mild with possible right frontal sharp wave  5.  Diagnosed with lung cancer in August 2017  6.  REM sleep disorder symptoms    As part of this visit  Reviewed primary MD notes  4/29/2021 4/16/2021  And some previous notes also  Cardiology notes 3/9/2021 reviewed  Labs reviewed    Recent labs April 5, 2021  White blood count 8.1, hemoglobin 13.5, platelets are 50,000  Sodium 143 potassium 4.8  BUN 19 creatinine 0.9  Glucose 116  TSH 1.19, B12 433 (February 2021)    Since last evaluated on video October 9, 2020  No hospitalizations  No surgeries  No Covid illness  Did have a ear infection      Patient also had a note from his primary for me to review  Concerned that with the slow progression of his dementia whether this is something different than Alzheimer's  There is been a debate with the REM sleep difficulty in the beginning whether it was Lewy body  He seems to have a slow course so it could be 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  It could be a combination of the above  Could be a atypical Alzheimer's  I do not feel that further testing for  tile or amyloid with special scans would change the way we treat thanks  Did have a discussion with patient and family about this      Patient has difficulty with neuropathy  It affects his gait and balance was reviewed at length about safety  Still ambulating without a cane although said that when he is outside sometimes he might use a walking stick  Is up out of the chair okay  Is carrying groceries up and down stairs which I do not  think is a good idea and we reviewed    Does have difficulty with dysesthesias and restless leg type symptoms which make it difficult to sleep much better with the Lyrica    Does have some missing teeth which makes chewing difficult but no swallowing problems    When he standing in [facility] he gets a little lightheaded prior little bit of autonomic instability discussed with patient and family members    On Lyrica 100 mg 3 times daily for his neuropathy would not go up any higher on this    On today's visit we reviewed his neuropathy he has burning dysesthesias I do not think we should add any new medications he is switching his care to our service is already on Lyrica 100 mg 3 times daily I would not change that I would not go up on the dose if he is having side effects in the future for the meds we would have to decrease the dose    He does exercise and finds that it makes his feet feel better this is good for his dementia though also he rides the exercise bike but then his feet get hot puts them in cold water but only for a few minutes warned him about frostbite or over freezing the feet which could make things worse he can use counter stimulation such as a stocking or massage to help if there are pins-and-needles but for the temperature changes be careful if he is using the cold water      Patient lives with wife  Rediscussed that I did not think it was a good idea that he drives  Reviewed the 2 driving test 5/10/2019, and February 15, 2019.  He is driving under special  circumstances no highway only during daytime limited routes  Drives to the gas station  [place]  I still feel that it be better that he bows out from driving and rediscussed my concerns    Currently does activities of daily living okay gets cleaned up dressed changes  Eats okay swallows okay  With neuropathy has difficulty ambulating on uneven surfaces  Sometimes gets lightheaded if he gets up too quick  Does not seem to have the REM sleep difficulties now on the melatonin  May have some vivid dreams but cannot really remember them  No true hallucinations  Does help with some chores around the house wife does the cooking        Since last seen has been relatively stable  Did have a problem though where he dropped his hemoglobin from 13 down to 10  He is on Eliquis  Had a endoscopy and a colonoscopy  GI thought maybe there is some bleeding in the small intestine  Has been to have a pill camera  They are tracking his hemoglobin closely    No nausea no vomiting no GERD  No diarrhea  Times gets lightheaded if he gets up quick but his wife says he moves too fast most the time if he moves slowly do better  Does not need a cane for gait  Talked about gait safety low at length and especially with winter coming up  Likes to watch a lot of politics he says he does not get frustrated with that    No other new medications    Talked about the side effects of Aricept including but not limited to bradycardia which can cause lightheadedness sinus can cause ulcers and GI difficulties and should be aware of those too            Past neurologic  history  1. Onset of dementia cognitive difficulties as far back as 2011  2. Diagnosed with dementia and 2014  3. Axonal loss neuropathy going on for quite a few years more painful burning feet  4. Benign prostatic hypertrophy  5. Lung cancer August 2017  6. Subdural hematoma 2016  7.  REM sleep symptoms  8.  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  Subdural hematoma, 2016  Neuropathy  Atrial fibrillation  Pacemaker  Lung cancer, 2017  GERD, angiodysplasia of the duodenum  BPH  Glaucoma, cataracts  Hearing loss  Arthritis          Habits  Past smoker   May have a rare alcoholic beverage 1-2 times per month    Family history  Sister with schizophrenia memory loss onset age 60s memory care unit  Another sister  of cancer (question breast)  2 sisters  possibly of cancer is not sure  Brother with throat cancer  Dad with pneumonia  at age 55, lung cancer  Mother  in her 50s early 60s with heart attack             Current summary of past workup:    1. MRI scan of the brain in , mild atrophy, mild small vessel changes.  2. MRI of the cervical spine in , no significant cord impingement done at [facility].  3. Reference is to normal TSH and B12, and glucoses in the past, but I do not have all the numbers.  4. MRI scan in , slightly increased ventricles greater than atrophy, question NPH, small vessel changes, but gait is not consistent with NPH.              MRI scan head, 2019,              Unchanged lateral and third ventriculomegaly disproportionate to degree of sulci prominence              Volume loss bilateral hippocampal left greater than the right              Mild chronic small vessel changes  5. Reference to an EEG in the past showing right frontal slowing, mild with some questionable right frontal sharp wave.              EEG 2019, 9-10 Hz posterior dominant rhythm subtle theta disorganization no epileptiform activity  6. Mini-mental status score 22/30, 2017.  7. Neuropsychometric testing formal in the past, May 15, 2017 compared to  showing severe changes with:  a. Processing speed severely impaired.              b. Visuospatial severely impaired.              c. Memory is moderately impaired.              d. Executive functioning  decreased compared to 2014.  8.        Mo[facility]            12/17/2018, 12 out of 30            5/10/2021, 8 out of 30    9.       's evaluation March 2019 from [facility] stated patient should not drive            's evaluation [facility] may 2019 stated patient should not drive  10.     B12 433, TSH 1.19, (February 2021)      Exam review  Review of systems per HPI  Pertinent positives  Hearing loss of at least 70% on the left  Dysesthesias of the legs from his neuropathy  Balance trouble because of the neuropathy    No headache no chest pain no shortness of breath  No diplopia dysarthria dysphagia  No new focal weakness      Review of Systems   Patient is impulsive  He  uncomfortable sensations in his legs  Hearing loss on the left 70%    No diplopia no dysarthria no dysphasia  No focal weakness    No bowel or bladder difficulties that are new  No black tarry stools no blood in the stool no ABIEL    The REM sleep changes are rare vivid dreams but no true hallucinations    Otherwise review of systems negative    General exam  Blood pressure 179/61, pulse 86  HEENT significant for significant hearing loss  Lungs clear  Heart rate regular  Abdomen soft positive bowel sounds  Symmetrical pulses  No edema in the feet    Neurologic exam   alert and attentive  Formal [place] 8 out of 30 but is hard of hearing  Past [facility] 12 out of 30 2018    No aphasia  No neglect    Cranials 2 through 12 significant for  Marked decreased hearing  No ophthalmoplegia  No nystagmus  Visual fields intact  Face symmetrical      Upper extremities  No drift  No tremor  Normal finger-nose    Gait and lower extremities  Gets out of the chair uses one-handed pushoff but gets up fairly quickly  Ambulates around the room without an assistive device  No specific gait apraxia    Would do better with a cane or walking stick            Impression plan    1.  Alzheimer's dementia ( G30.1 )        Aricept 10 mg once per day        History of  some mild lightheadedness/autonomic problems monitoring        Has some history of mild REM sleep changes not severe        Melatonin 10 mg once at nighttime for REM sleep difficulties       2 driving evaluations in spring 2019 stated patient should be a nondriver.       [facility] 12 out of 30, December 2018.       [facility] 8 out of 30, May 10, 2021      We discussed actual type of dementia could be an atypical Alzheimer's to the slow course could have a component of chronic traumatic encephalopathy did have some REM sleep difficulties but does not seem to be typical of Lewy body as they seem to be easier to control and less bad now discussed that further testing with special scans would not change the treatment at this time  Dementia on the Aricept no further autonomic problems or fainting continue with current dose    2.  Axonal neuropathy ( G62.89 )       Has neuropathic pain       Better controlled with Lyrica 100 mg p.o. 3 times daily      3.   Previous MRI scans with ventricles out of proportion to atrophy, static hydrocephalus        Has not had typical gait did not feel he was a good shunt candidate        Has hippocampal atrophy left greater than right on MRI        [facility]CA 12 out of 30 in 2018.    4.  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 )       Melatonin 10 mg at nighttime      Plan  No driving    Aricept 10 mg once per day    Lyrica 100 mg 3 times daily, getting through [employer] through paperwork filled out by his primary    Melatonin 10 mg once per day    Follow-up in October 2021    Talked about good foot care  Talked about good gait safety  Patient very hard of hearing which does limit some of the cognitive testing  Primary GI will watch his hemoglobin he is on the Eliquis and is being worked up for possible GI bleed      As part of the visit today  Reviewed past EMR notes  Reviewed primary MD note 4/29/2021, 4/16/2021 as well as others  Reviewed cardiology notes 3/9/2021  Labs reviewed April and February  21      Total care time today 58 minutes  Extensive discussion about 2 separate problems neuropathy and dementia  Reviewed outside records and relationship to the above discussion        Again, thank you for allowing me to participate in the care of your patient.        Sincerely,        Tomi Hill MD

## 2021-05-10 NOTE — NURSING NOTE
Chief Complaint   Patient presents with     Follow Up     Pt states memory is about the same.      Megha Murphy LPN on 5/10/2021 at 1:35 PM

## 2021-06-23 NOTE — PROGRESS NOTES
Patient was monitored by RN via EKG and pulse oximeter throughout procedure. Patient stable throughout. medtronic rep reset pacer and discharged to home

## 2021-06-27 NOTE — PROGRESS NOTES
Progress Notes by Cynthia Garcia RN at 12/20/2018  8:20 AM     Author: Cynthia Garcia RN Service: -- Author Type: Registered Nurse    Filed: 12/20/2018  9:09 AM Encounter Date: 12/20/2018 Status: Signed    : Cynthia Garcia RN (Registered Nurse)

## 2021-12-26 DIAGNOSIS — F02.80 LATE ONSET ALZHEIMER'S DISEASE WITHOUT BEHAVIORAL DISTURBANCE (H): ICD-10-CM

## 2021-12-26 DIAGNOSIS — G30.1 LATE ONSET ALZHEIMER'S DISEASE WITHOUT BEHAVIORAL DISTURBANCE (H): ICD-10-CM

## 2021-12-27 NOTE — TELEPHONE ENCOUNTER
Medication refill request for Donepezil 10 MG.     Patient's next appointment is scheduled on January 03, 2022.    Medication T'd for review and signature    DONG Rivera on 12/27/2021 at 3:27 PM

## 2021-12-28 RX ORDER — DONEPEZIL HYDROCHLORIDE 10 MG/1
TABLET, FILM COATED ORAL
Qty: 90 TABLET | Refills: 3 | Status: SHIPPED | OUTPATIENT
Start: 2021-12-28 | End: 2022-01-03

## 2022-01-03 ENCOUNTER — OFFICE VISIT (OUTPATIENT)
Dept: NEUROLOGY | Facility: CLINIC | Age: 87
End: 2022-01-03
Payer: COMMERCIAL

## 2022-01-03 VITALS
SYSTOLIC BLOOD PRESSURE: 127 MMHG | DIASTOLIC BLOOD PRESSURE: 45 MMHG | BODY MASS INDEX: 22.5 KG/M2 | WEIGHT: 181 LBS | HEIGHT: 75 IN | HEART RATE: 76 BPM

## 2022-01-03 DIAGNOSIS — F02.80 ALZHEIMER'S DISEASE (H): Primary | ICD-10-CM

## 2022-01-03 DIAGNOSIS — G62.89 AXONAL NEUROPATHY: ICD-10-CM

## 2022-01-03 DIAGNOSIS — G30.9 ALZHEIMER'S DISEASE (H): Primary | ICD-10-CM

## 2022-01-03 DIAGNOSIS — R26.9 GAIT DISTURBANCE: ICD-10-CM

## 2022-01-03 PROCEDURE — 99214 OFFICE O/P EST MOD 30 MIN: CPT | Performed by: PSYCHIATRY & NEUROLOGY

## 2022-01-03 ASSESSMENT — MIFFLIN-ST. JEOR: SCORE: 1588.7

## 2022-01-03 NOTE — PROGRESS NOTES
In person evaluation    HPI  11/19/2019, in person visit  6/5/2020, video visit  10/9/2020, video visit  5/10/2021, in person visit  1/3/2022, in person evaluation    86-year-old followed neurologically for:  1.  History of memory fall off as far back as [id number]       Originally evaluated by Dr. Jose Beckford 2014 for dementia was on Aricept  2.  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 Lucia Beckford at [facility]  3.  History of subdural hematoma from falling backwards in 2016       Possible EEG in the past showing a right frontal slowing mild with possible right frontal sharp wave  5.  Diagnosed with lung cancer in August 2017  6.  REM sleep disorder symptoms      Since last seen May 2021  Did go to the emergency room in mid December with dizziness diarrhea upset stomach  [facility] was [facility] at [facility]  Primary stop the Preva gin and the Aricept  We talked about Aricept can sometimes cause GI distress heartburn diarrhea and lightheadedness  He feels that he might be better off of it    Patient also stopped his Prevagin which he is convinced helps him he likes the ads on TV  I will leave up to him whether he wants to go back on it but I am not so sure that it is helpful    Does have a very hesitant type gait  Is always careful low  Has the length dependent neuropathy with burning feet symptoms          A.  Dementia       With REM sleep disorder       Onset as far back as [id number]       Original work-up by Dr. 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       It could be a combination of the above       Could be a atypical  Alzheimer's       I do not feel that further testing for tile or amyloid with special scans would change the way we treat thanks        Did have a discussion with patient and family about this    He does exercise and finds that it makes his feet feel better this is good for his dementia though also he rides the exercise bike but then his feet get hot puts them in cold water but only for a few minutes warned him about frostbite or over freezing the feet which could make things worse he can use counter stimulation such as a stocking or massage to help if there are pins-and-needles but for the temperature changes be careful if he is using the cold water    Patient lives with wife  Rediscussed that I did not think it was a good idea that he drives  Reviewed the 2 driving test 5/10/2019, and February 15, 2019.  In the past  He is driving under special circumstances no highway only during daytime limited routes  Drives to the gas station  [facility]  I still feel that it be better that he bows out from driving and rediscussed my concerns      Currently does activities of daily living okay gets cleaned up dressed changes  Eats okay swallows okay  With neuropathy has difficulty ambulating on uneven surfaces  Sometimes gets lightheaded if he gets up too quick  Does not seem to have the REM sleep difficulties now on the melatonin  May have some vivid dreams but cannot really remember them  No true hallucinations  Does help with some chores around the house wife does the cooking      He is on Eliquis  Had a endoscopy and a colonoscopy in the past due to dropping hemoglobin  GI thought maybe there is some bleeding in the small intestine  Has been to have a pill camera  They are tracking his hemoglobin closely    Currently now  No nausea no vomiting   No GERD  No diarrhea  Times gets lightheaded if he gets up quick but his wife says he moves too fast most the time if he moves slowly do better  Does not need a  cane for gait  Talked about gait safety low at length and especially with winter coming up  Likes to watch a lot of politics he says he does not get frustrated with that    No other new medications            B.  Length dependent axonal neuropathy       Previously worked up by Dr. Lucia Beckford at [facility]         Patient has difficulty with neuropathy       It affects his gait and balance was reviewed at length about safety       Still ambulating without a cane although said that when he is outside sometimes he might use a walking stick       Is up out of the chair okay         Does have difficulty with dysesthesias and restless leg type symptoms which make it difficult to sleep much better with the Lyrica       When he standing in [employer] he gets a little lightheaded prior little bit of autonomic instability discussed with patient and family members         On Lyrica 100 mg 3 times daily for his neuropathy would not go up any higher on this    C.  Past traumatic subdural hematoma secondary to fall in 2016        EEG showed past right frontal slowing with possible right frontal sharp wave    D.  History of lung cancer diagnosed August 2017    E.  Some swallowing difficulty        Does have some missing teeth which makes chewing difficult but no swallowing problems        Past neurologic  history  1. Onset of dementia cognitive difficulties as far back as 2011  2. Diagnosed with dementia and 2014  3. Axonal loss neuropathy going on for quite a few years more painful burning feet  4. Benign prostatic hypertrophy  5. Lung cancer August 2017  6. Subdural hematoma 2016  7.  REM sleep symptoms  8.  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 MRI scan of the brain in , mild atrophy, mild small vessel changes.  2. MRI of the cervical spine in , no significant cord impingement done at [facility].  3. Reference is to normal TSH and B12, and glucoses in the past, but I do not have all the numbers.  4. MRI scan in , slightly increased ventricles greater than atrophy, question NPH, small vessel changes, but gait is not consistent with NPH.              MRI scan head, 2019,              Unchanged lateral and third ventriculomegaly disproportionate to degree of sulci prominence              Volume loss bilateral hippocampal left greater than the right              Mild chronic small vessel changes  5. Reference to an EEG in the past showing right frontal slowing, mild with some questionable right frontal sharp wave.              EEG 2019, 9-10 Hz posterior dominant rhythm subtle theta disorganization no epileptiform activity  6. Mini-mental status score 22/30, 2017.  7. Neuropsychometric testing formal in the past, May 15, 2017 compared to  showing severe changes with:  a. Processing speed severely impaired.              b. Visuospatial severely impaired.              c. Memory is moderately impaired.              d. Executive functioning decreased compared to 2014.  8.        MoCA            2018, 12 out of 30            5/10/2021,    8 out of 30    9.       's evaluation 2019 from [facility] stated patient should not drive            's evaluation [facility] may 2019 stated  patient should not drive  10.     B12 433, TSH 1.19, (February 2021)  TSH 1.19, B12 433 (February 2021)  Laboratory data April 5, 2021  White blood count 8.1, hemoglobin 13.5, platelets are 50,000  Sodium 143 potassium 4.8  BUN 19 creatinine 0.9  Glucose 116        Exam review    Pertinent positives  Hearing loss of at least 70% on the left  Dysesthesias of the legs from his neuropathy  Balance trouble because of the neuropathy    No headache no chest pain no shortness of breath  No diplopia dysarthria dysphagia  No new focal weakness    Off the Aricept  No GERD no diarrhea    Still may get a little bit of lightheadedness and somewhat ataxic    No bowel or bladder difficulties that are new  No black tarry stools no blood in the stool     The REM sleep changes are rare vivid dreams but no true hallucinations    Otherwise review of systems negative    General exam  Blood pressure 127/45, pulse 76  HEENT significant for significant hearing loss  Lungs clear  Heart rate regular  Abdomen soft positive bowel sounds  Symmetrical pulses  No edema in the feet    Neurologic exam   alert and attentive  Formal [place] 8 out of 30 but is hard of hearing (May 2021)  Past MoCA 12 out of 30 2018    No aphasia  No neglect    Cranials 2 through 12 significant for  Marked decreased hearing  No ophthalmoplegia  No nystagmus  Visual fields intact  Face symmetrical      Upper extremities  No drift  No tremor  Normal finger-nose    Gait and lower extremities  Gets out of the chair uses one-handed pushoff but gets up fairly quickly  Ambulates around the room without an assistive device  No specific gait apraxia    Would do better with a cane or walking stick  Gait is just a little bit hesitant          Impression plan    1.  Alzheimer's dementia ( G30.1 )        Aricept stopped in December 2021 had some GI symptoms diarrhea made a little bit lightheadedness        History of some mild lightheadedness/autonomic problems monitoring        Has  some history of mild REM sleep changes not severe        Melatonin 10 mg once at nighttime for REM sleep difficulties       2 driving evaluations in spring 2019 stated patient should be a nondriver.       MoCA 12 out of 30, December 2018.       MoCA 8 out of 30, May 10, 2021        We discussed actual type of dementia could be an atypical Alzheimer's to the slow course could have a component of chronic traumatic encephalopathy did have some REM sleep difficulties but does not seem to be typical of Lewy body as they seem to be easier to control and less bad now discussed that further testing with special scans would not change the treatment at this time  Dementia on the Aricept no further autonomic problems or fainting continue with current dose    2.  Axonal neuropathy ( G62.89 )       Has neuropathic pain       Better controlled with Lyrica 100 mg p.o. 3 times daily      3.   Previous MRI scans with ventricles out of proportion to atrophy, static hydrocephalus        Has not had typical gait did not feel he was a good shunt candidate        Has hippocampal atrophy left greater than right on MRI        MoCA 12 out of 30 in 2018.    4.  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 )       Melatonin 10 mg at nighttime      Plan  No driving    Off the Aricept now    Lyrica 100 mg 3 times daily, getting through [employer] through paperwork filled out by his primary    Melatonin 10 mg once per day    Talked about gait safety with his mild ataxia from his neuropathy  Talked about good foot care  Seems that should she had to be able to interact better than his MoCA testing would imply  Off the Aricept due to GI symptoms stopped December 2021    Follow-up in 6 months        As part of the visit today  Reviewed notes 12/29/2021 PMD  Reviewed ED notes 12/14/2021    Total care time today 32 minutes discussing and evaluating the above

## 2022-01-03 NOTE — LETTER
1/3/2022         RE: Rosemary Farmer  [address]  [facility] [address]        Dear Colleague,    Thank you for referring your patient, Rosemary Farmer, to the [facility] [place]. Please see a copy of my visit note below.    In person evaluation    HPI  11/19/2019, in person visit  6/5/2020, video visit  10/9/2020, video visit  5/10/2021, in person visit  1/3/2022, in person evaluation    86-year-old followed neurologically for:  1.  History of memory fall off as far back as [id number]       Originally evaluated by Dr. Jose Beckford 2014 for dementia was on Aricept  2.  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 Lucia Beckford at [facility]  3.  History of subdural hematoma from falling backwards in 2016       Possible EEG in the past showing a right frontal slowing mild with possible right frontal sharp wave  5.  Diagnosed with lung cancer in August 2017  6.  REM sleep disorder symptoms      Since last seen May 2021  Did go to the emergency room in mid December with dizziness diarrhea upset stomach  [facility] was [facility] at [facility]  Primary stop the Preva gin and the Aricept  We talked about Aricept can sometimes cause GI distress heartburn diarrhea and lightheadedness  He feels that he might be better off of it    Patient also stopped his Prevagin which he is convinced helps him he likes the ads on TV  I will leave up to him whether he wants to go back on it but I am not so sure that it is helpful    Does have a very hesitant type gait  Is always careful low  Has the length dependent neuropathy with burning feet symptoms          A.  Dementia       With REM sleep disorder       Onset as far back as [phone]       Original work-up by Dr. 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       It could be a combination of the above       Could be a atypical Alzheimer's       I do not feel that further testing for tile or amyloid with special scans would change the way we treat thanks        Did have a discussion with patient and family about this    He does exercise and finds that it makes his feet feel better this is good for his dementia though also he rides the exercise bike but then his feet get hot puts them in cold water but only for a few minutes warned him about frostbite or over freezing the feet which could make things worse he can use counter stimulation such as a stocking or massage to help if there are pins-and-needles but for the temperature changes be careful if he is using the cold water    Patient lives with wife  Rediscussed that I did not think it was a good idea that he drives  Reviewed the 2 driving test 5/10/2019, and February 15, 2019.  In the past  He is driving under special circumstances no highway only during daytime limited routes  Drives to the gas station  [facility]  I still feel that it be better that he bows out from driving and rediscussed my concerns      Currently does activities of daily living okay gets cleaned up dressed changes  Eats okay swallows okay  With neuropathy has difficulty ambulating on uneven surfaces  Sometimes gets lightheaded if he gets up too quick  Does not seem to have the REM sleep difficulties now on the melatonin  May have some vivid dreams but cannot really remember them  No true hallucinations  Does help with some chores around the house wife does the cooking      He is on Eliquis  Had a endoscopy and a colonoscopy in the past due to dropping hemoglobin  GI thought maybe there is some bleeding in the small intestine  Has been to have a pill  camera  They are tracking his hemoglobin closely    Currently now  No nausea no vomiting   No GERD  No diarrhea  Times gets lightheaded if he gets up quick but his wife says he moves too fast most the time if he moves slowly do better  Does not need a cane for gait  Talked about gait safety low at length and especially with winter coming up  Likes to watch a lot of politics he says he does not get frustrated with that    No other new medications            B.  Length dependent axonal neuropathy       Previously worked up by Dr. Lucia Beckford at [facility]         Patient has difficulty with neuropathy       It affects his gait and balance was reviewed at length about safety       Still ambulating without a cane although said that when he is outside sometimes he might use a walking stick       Is up out of the chair okay         Does have difficulty with dysesthesias and restless leg type symptoms which make it difficult to sleep much better with the Lyrica       When he standing in [employer] he gets a little lightheaded prior little bit of autonomic instability discussed with patient and family members         On Lyrica 100 mg 3 times daily for his neuropathy would not go up any higher on this    C.  Past traumatic subdural hematoma secondary to fall in 2016        EEG showed past right frontal slowing with possible right frontal sharp wave    D.  History of lung cancer diagnosed August 2017    E.  Some swallowing difficulty        Does have some missing teeth which makes chewing difficult but no swallowing problems        Past neurologic  history  1. Onset of dementia cognitive difficulties as far back as 2011  2. Diagnosed with dementia and 2014  3. Axonal loss neuropathy going on for quite a few years more painful burning feet  4. Benign prostatic hypertrophy  5. Lung cancer August 2017  6. Subdural hematoma 2016  7.  REM sleep symptoms  8.  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 MRI scan of the brain in , mild atrophy, mild small vessel changes.  2. MRI of the cervical spine in , no significant cord impingement done at [facility].  3. Reference is to normal TSH and B12, and glucoses in the past, but I do not have all the numbers.  4. MRI scan in , slightly increased ventricles greater than atrophy, question NPH, small vessel changes, but gait is not consistent with NPH.              MRI scan head, 2019,              Unchanged lateral and third ventriculomegaly disproportionate to degree of sulci prominence              Volume loss bilateral hippocampal left greater than the right              Mild chronic small vessel changes  5. Reference to an EEG in the past showing right frontal slowing, mild with some questionable right frontal sharp wave.              EEG 2019, 9-10 Hz posterior dominant rhythm subtle theta disorganization no epileptiform activity  6. Mini-mental status score 22/30, 2017.  7. Neuropsychometric testing formal in the past, May 15, 2017 compared to  showing severe changes with:  a. Processing speed severely impaired.              b. Visuospatial severely impaired.              c. Memory is moderately impaired.              d. Executive functioning decreased compared to  2014.  8.        MoCA            12/17/2018, 12 out of 30            5/10/2021,    8 out of 30    9.       's evaluation March 2019 from [facility] stated patient should not drive            's evaluation [facility] may 2019 stated patient should not drive  10.     B12 433, TSH 1.19, (February 2021)  TSH 1.19, B12 433 (February 2021)  Laboratory data April 5, 2021  White blood count 8.1, hemoglobin 13.5, platelets are 50,000  Sodium 143 potassium 4.8  BUN 19 creatinine 0.9  Glucose 116        Exam review    Pertinent positives  Hearing loss of at least 70% on the left  Dysesthesias of the legs from his neuropathy  Balance trouble because of the neuropathy    No headache no chest pain no shortness of breath  No diplopia dysarthria dysphagia  No new focal weakness    Off the Aricept  No GERD no diarrhea    Still may get a little bit of lightheadedness and somewhat ataxic    No bowel or bladder difficulties that are new  No black tarry stools no blood in the stool     The REM sleep changes are rare vivid dreams but no true hallucinations    Otherwise review of systems negative    General exam  Blood pressure 127/45, pulse 76  HEENT significant for significant hearing loss  Lungs clear  Heart rate regular  Abdomen soft positive bowel sounds  Symmetrical pulses  No edema in the feet    Neurologic exam   alert and attentive  Formal [place] 8 out of 30 but is hard of hearing (May 2021)  Past MoCA 12 out of 30 2018    No aphasia  No neglect    Cranials 2 through 12 significant for  Marked decreased hearing  No ophthalmoplegia  No nystagmus  Visual fields intact  Face symmetrical      Upper extremities  No drift  No tremor  Normal finger-nose    Gait and lower extremities  Gets out of the chair uses one-handed pushoff but gets up fairly quickly  Ambulates around the room without an assistive device  No specific gait apraxia    Would do better with a cane or walking stick  Gait is just a little bit  hesitant          Impression plan    1.  Alzheimer's dementia ( G30.1 )        Aricept stopped in December 2021 had some GI symptoms diarrhea made a little bit lightheadedness        History of some mild lightheadedness/autonomic problems monitoring        Has some history of mild REM sleep changes not severe        Melatonin 10 mg once at nighttime for REM sleep difficulties       2 driving evaluations in spring 2019 stated patient should be a nondriver.       MoCA 12 out of 30, December 2018.       MoCA 8 out of 30, May 10, 2021        We discussed actual type of dementia could be an atypical Alzheimer's to the slow course could have a component of chronic traumatic encephalopathy did have some REM sleep difficulties but does not seem to be typical of Lewy body as they seem to be easier to control and less bad now discussed that further testing with special scans would not change the treatment at this time  Dementia on the Aricept no further autonomic problems or fainting continue with current dose    2.  Axonal neuropathy ( G62.89 )       Has neuropathic pain       Better controlled with Lyrica 100 mg p.o. 3 times daily      3.   Previous MRI scans with ventricles out of proportion to atrophy, static hydrocephalus        Has not had typical gait did not feel he was a good shunt candidate        Has hippocampal atrophy left greater than right on MRI        MoCA 12 out of 30 in 2018.    4.  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 )       Melatonin 10 mg at nighttime      Plan  No driving    Off the Aricept now    Lyrica 100 mg 3 times daily, getting through [employer] through paperwork filled out by his primary    Melatonin 10 mg once per day    Talked about gait safety with his mild ataxia from his neuropathy  Talked about good foot care  Seems that should she had to be able to interact better than his MoCA testing would imply  Off the Aricept due to GI symptoms stopped December 2021    Follow-up in 6 months        As  part of the visit today  Reviewed notes 12/29/2021 PMD  Reviewed ED notes 12/14/2021    Total care time today 32 minutes discussing and evaluating the above      Again, thank you for allowing me to participate in the care of your patient.        Sincerely,        Tomi Hill MD

## 2022-01-03 NOTE — NURSING NOTE
Chief Complaint   Patient presents with     Memory Loss     follow up. Pt no longer taking donepezil and Prevagen last week due to stomach issues per PCP.     NEUROPATHY     Pt states he is feeling pain, burning and tingling in bilat feet, has been getting worse. Occasionally has in fingers.     Megha Murphy LPN on 1/3/2022 at 1:22 PM

## 2022-06-15 ENCOUNTER — TELEPHONE (OUTPATIENT)
Dept: NEUROLOGY | Facility: CLINIC | Age: 87
End: 2022-06-15
Payer: COMMERCIAL

## 2022-06-15 NOTE — TELEPHONE ENCOUNTER
If the wife feels that the symptoms seem to be somewhat related to starting the new medication then I would first recommend stopping the new medication.    The other concern is the patient is on Lyrica 100 mg 3 times daily through his primary for his neuropathy.    The Lyrica can often cause hallucinations and confusion in people who are older (the elderly).    Would first try stopping the new medication  And if that does not seem to help consider taking away one of the Lyrica tablets.    Keep follow-up visit that is scheduled for 6/28/2022.    Tomi Hill MD on 6/15/2022 at 2:59 PM

## 2022-06-15 NOTE — TELEPHONE ENCOUNTER
"[facility] Call Center    Phone Message    May a detailed message be left on voicemail: yes     Reason for Call: Symptoms or Concerns     If patient has red-flag symptoms, warm transfer to triage line    Current symptom or concern: Extreme confusion and cognitive issues/hallucinations     Symptoms have been present for:  3 day(s)    Has patient previously been seen for this? Yes    By Dr. Hill     Are there any new or worsening symptoms? Yes: Pt wife Aparna called and stated that her 's PCP put him on Galantamine for his confusion for about a month but for the past 3 days it has gotten much worse. Aparna stated that the Pt believes that there are 2 of her and made a comment while they were in the car after an appt that \"the second Aparna is waiting at home\". Aparna is very concerned at the recent decline and stated that she can tell her  is concerned as well. Please call Aparna back to discuss.      Action Taken: Message routed to:  [facility] ([facility]    Travel Screening: Not Applicable                                                                      "

## 2022-06-16 NOTE — TELEPHONE ENCOUNTER
Spoke with pt's wife and relayed Dr. Hill' message. She verbalized understanding.     Fernanda Rider RN on 6/16/2022 at 10:27 AM

## 2022-06-27 NOTE — PROGRESS NOTES
"In person evaluation    HPI  11/19/2019, in person visit  6/5/2020, video visit  10/9/2020, video visit  5/10/2021, in person visit  1/3/2022, in person evaluation  6/28/2022, in person visit      87-year-old followed neurologically for:  1.  History of memory fall off as far back as [id number]       Originally evaluated by Dr. Jose Beckford 2014 for dementia was on Aricept  2.  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 Lucia Beckford at [email or web address]  3.  History of subdural hematoma from falling backwards in 2016       Possible EEG in the past showing a right frontal slowing mild with possible right frontal sharp wave  5.  Diagnosed with lung cancer in August 2017  6.  REM sleep disorder symptoms        Since last seen 6 months ago  Very organized person in the past now very disorganized  Has trouble finding the bathroom at nighttime  Needs help when doing buttons finding the right sleeve for his pant leg when getting dressed  May have some \"dressing apraxia    Loses things quite a lot  Repeats himself a lot    Seems to be somewhat uncoordinated spilled his coffee and water all the time    Balance seems to be getting worse    More confused    Somewhere along the path he was placed May be on [employer] patch had hallucinations  Did not do well so that was stopped    Patient was in the ER 6/22/2022  Patient had gastrointestinal problems  And has recurrence of lung cancer  Is on Eliquis for his A. Fib  Has duodenal angiodysplasia  Has sick sinus syndrome and has a pacemaker    Multiple medical issues      Discussed with the patient's wife that every time he is sick or has other medical issues his neurologic condition seems to deteriorate further      In the past when evaluated  Side effects from Aricept December 2021 dizziness diarrhea upset stomach  Primary stopped med Aricept  Primary stop the Preva gin and the Aricept  We talked about Aricept can sometimes cause GI " distress heartburn diarrhea and lightheadedness  He feels that he might be better off of it    Patient also stopped his Prevagin which he is convinced helps him he likes the ads on TV  I will leave up to him whether he wants to go back on it but I am not so sure that it is helpful    Does have a very hesitant type gait  Is always careful low  Has the length dependent neuropathy with burning feet symptoms          A.  Dementia       With REM sleep disorder       Onset as far back as [id number]       Original work-up by Dr. 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       It could be a combination of the above       Could be a atypical Alzheimer's       I do not feel that further testing for tile or amyloid with special scans would change the way we treat thanks        Did have a discussion with patient and family about this    He does exercise and finds that it makes his feet feel better this is good for his dementia though also he rides the exercise bike but then his feet get hot puts them in cold water but only for a few minutes warned him about frostbite or over freezing the feet which could make things worse he can use counter stimulation such as a stocking or massage to help if there are pins-and-needles but for the temperature changes be careful if he is using the cold water    Patient lives with wife  Rediscussed that I did not think it was a good idea that he drives  Reviewed the 2 driving test 5/10/2019, and February 15, 2019.  In the past  He is driving under special circumstances no highway only during daytime limited routes  Drives to the gas station  [facility]  I still feel that it be  better that he bows out from driving and rediscussed my concerns      Currently does activities of daily living okay gets cleaned up dressed changes  Eats okay swallows okay  With neuropathy has difficulty ambulating on uneven surfaces  Sometimes gets lightheaded if he gets up too quick  Does not seem to have the REM sleep difficulties now on the melatonin  May have some vivid dreams but cannot really remember them  No true hallucinations  Does help with some chores around the house wife does the cooking      He is on Eliquis  Had a endoscopy and a colonoscopy in the past due to dropping hemoglobin  GI thought maybe there is some bleeding in the small intestine  Has been to have a pill camera  They are tracking his hemoglobin closely    Currently now  No nausea no vomiting   No GERD  No diarrhea  Times gets lightheaded if he gets up quick but his wife says he moves too fast most the time if he moves slowly do better  Does not need a cane for gait  Talked about gait safety low at length and especially with winter coming up  Likes to watch a lot of politics he says he does not get frustrated with that    No other new medications            B.  Length dependent axonal neuropathy       Previously worked up by Dr. Lucia Beckford at [facility]         Patient has difficulty with neuropathy       It affects his gait and balance was reviewed at length about safety       Still ambulating without a cane although said that when he is outside sometimes he might use a walking stick       Is up out of the chair okay         Does have difficulty with dysesthesias and restless leg type symptoms which make it difficult to sleep much better with the Lyrica       When he standing in [place] he gets a little lightheaded prior little bit of autonomic instability discussed with patient and family members         On Lyrica 100 mg 3 times daily for his neuropathy would not go up any higher on this    C.  Past traumatic subdural hematoma  secondary to fall in         EEG showed past right frontal slowing with possible right frontal sharp wave    D.  History of lung cancer diagnosed 2017    E.  Some swallowing difficulty        Does have some missing teeth which makes chewing difficult but no swallowing problems        Past neurologic  history  1. Onset of dementia cognitive difficulties as far back as   2. Diagnosed with dementia and   3. Axonal loss neuropathy going on for quite a few years more painful burning feet  4. Benign prostatic hypertrophy  5. Lung cancer 2017  6. Subdural hematoma   7.  REM sleep symptoms  8.  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 MRI scan of the brain in , mild atrophy, mild small vessel changes.  2. MRI of the cervical spine in , no significant cord impingement done at [facility].  3. Reference is to normal TSH and B12, and glucoses in the past, but I do not have all the numbers.  4. MRI scan in , slightly increased ventricles greater than atrophy, question NPH, small vessel changes, but gait is not consistent with NPH.              MRI scan head, 2019,              Unchanged lateral and third ventriculomegaly disproportionate to degree of sulci prominence              Volume  loss bilateral hippocampal left greater than the right              Mild chronic small vessel changes  5. Reference to an EEG in the past showing right frontal slowing, mild with some questionable right frontal sharp wave.              EEG 2/26/2019, 9-10 Hz posterior dominant rhythm subtle theta disorganization no epileptiform activity  6. Mini-mental status score 22/30, July 2017.  7. Neuropsychometric testing formal in the past, May 15, 2017 compared to 2014 showing severe changes with:  a. Processing speed severely impaired.              b. Visuospatial severely impaired.              c. Memory is moderately impaired.              d. Executive functioning decreased compared to 2014.  8.        MoCA            12/17/2018, 12 out of 30            5/10/2021,    8 out of 30    9.       's evaluation March 2019 from [place] [facility] stated patient should not drive            's evaluation [facility] may 2019 stated patient should not drive  10.     B12 433, TSH 1.19, (February 2021)  TSH 1.19, B12 433 (February 2021)  Laboratory data April 5, 2021  White blood count 8.1, hemoglobin 13.5, platelets are 50,000  Sodium 143 potassium 4.8  BUN 19 creatinine 0.9  Glucose 116        Exam review    Pertinent positives  Hearing loss of at least 70% on the left  Dysesthesias of the legs from his neuropathy  Balance trouble because of the neuropathy    No headache no chest pain no shortness of breath  No diplopia dysarthria dysphagia  No new focal weakness    Off the Aricept  No GERD no diarrhea    Still may get a little bit of lightheadedness and somewhat ataxic    No bowel or bladder difficulties that are new  No black tarry stools no blood in the stool     The REM sleep changes are rare vivid dreams but no true hallucinations    Otherwise review of systems negative    General exam  Blood pressure 132/61, pulse 79  HEENT significant for significant hearing loss  Lungs clear  Heart rate regular  Abdomen soft  positive bowel sounds  Symmetrical pulses  No edema in the feet    Neurologic exam   alert and attentive  Formal [place] 8 out of 30 but is hard of hearing (May 2021)  Past MoCA 12 out of 30 2018    No aphasia  No neglect    Cranials 2 through 12 significant for  Marked decreased hearing  No ophthalmoplegia  No nystagmus  Visual fields intact  Face symmetrical      Upper extremities  No drift  No tremor  Normal finger-nose    Gait and lower extremities  Gets out of the chair uses one-handed pushoff but gets up fairly quickly  Ambulates around the room without an assistive device  No specific gait apraxia    Would do better with a cane or walking stick  Gait is just a little bit hesitant          Impression plan    1.  Alzheimer's dementia ( G30.1 )        Aricept stopped in December 2021 had some GI symptoms diarrhea made a little bit lightheadedness        History of some mild lightheadedness/autonomic problems monitoring        Has some history of mild REM sleep changes not severe        Melatonin 10 mg once at nighttime for REM sleep difficulties       2 driving evaluations in spring 2019 stated patient should be a nondriver.       MoCA 12 out of 30, December 2018.       MoCA 8 out of 30, May 10, 2021        We discussed actual type of dementia could be an atypical Alzheimer's to the slow course could have a component of chronic traumatic encephalopathy did have some REM sleep difficulties but does not seem to be typical of Lewy body as they seem to be easier to control and less bad now discussed that further testing with special scans would not change the treatment at this time  Dementia    Had side effects to Aricept and had side effects to other cholinergic meds with hallucinations so they were stopped         2.  Axonal neuropathy ( G62.89 )       Has neuropathic pain       Better controlled with Lyrica 100 mg p.o. 3 times daily      3.   Previous MRI scans with ventricles out of proportion to atrophy, static  hydrocephalus        Has not had typical gait did not feel he was a good shunt candidate        Has hippocampal atrophy left greater than right on MRI        MoCA 12 out of 30 in 2018.    4.  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 )       Melatonin 10 mg at nighttime      Plan  No driving    Off the Aricept now off all cholinergic meds due to some hallucinations seems to do better off of the meds    Lyrica 100 mg 3 times daily, getting through [employer] through paperwork filled out by his primary    Melatonin 10 mg once per day    Talked about gait safety with his mild ataxia from his neuropathy  Talked about good foot care  Seems that he is  able to interact better than his MoCA testing would imply  Off the Aricept due to GI symptoms stopped December 2021    Follow-up in 6 months      Discussed multiple issues with patient and wife above  Concern that he continues to decline and she is going to need increased help to care for him she has thought about this  Talked about activities of daily living/advanced directive/caregiver burnout    Total care time today 32 minutes

## 2022-06-28 ENCOUNTER — OFFICE VISIT (OUTPATIENT)
Dept: NEUROLOGY | Facility: CLINIC | Age: 87
End: 2022-06-28
Payer: COMMERCIAL

## 2022-06-28 VITALS
HEIGHT: 75 IN | DIASTOLIC BLOOD PRESSURE: 61 MMHG | SYSTOLIC BLOOD PRESSURE: 132 MMHG | HEART RATE: 79 BPM | WEIGHT: 179 LBS | BODY MASS INDEX: 22.26 KG/M2

## 2022-06-28 DIAGNOSIS — G62.89 AXONAL NEUROPATHY: ICD-10-CM

## 2022-06-28 DIAGNOSIS — G30.9 ALZHEIMER'S DISEASE (H): Primary | ICD-10-CM

## 2022-06-28 DIAGNOSIS — F02.80 ALZHEIMER'S DISEASE (H): Primary | ICD-10-CM

## 2022-06-28 PROCEDURE — 99214 OFFICE O/P EST MOD 30 MIN: CPT | Performed by: PSYCHIATRY & NEUROLOGY

## 2022-06-28 RX ORDER — BISACODYL 10 MG
10 SUPPOSITORY, RECTAL RECTAL
COMMUNITY
Start: 2022-06-23 | End: 2023-01-01

## 2022-06-28 NOTE — NURSING NOTE
Chief Complaint   Patient presents with     Dementia     6 month follow up. No concerns.      Megha Murphy LPN on 6/28/2022 at 12:05 PM

## 2022-06-28 NOTE — LETTER
"    6/28/2022         RE: Rosemary Farmer  [address]  [place] [address]        Dear Colleague,    Thank you for referring your patient, Rosemary Farmer, to the [facility] [place]. Please see a copy of my visit note below.    In person evaluation    HPI  11/19/2019, in person visit  6/5/2020, video visit  10/9/2020, video visit  5/10/2021, in person visit  1/3/2022, in person evaluation  6/28/2022, in person visit      87-year-old followed neurologically for:  1.  History of memory fall off as far back as [id number]       Originally evaluated by Dr. Jose Beckford 2014 for dementia was on Aricept  2.  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 Lucia Beckford at [facility]  3.  History of subdural hematoma from falling backwards in 2016       Possible EEG in the past showing a right frontal slowing mild with possible right frontal sharp wave  5.  Diagnosed with lung cancer in August 2017  6.  REM sleep disorder symptoms        Since last seen 6 months ago  Very organized person in the past now very disorganized  Has trouble finding the bathroom at nighttime  Needs help when doing buttons finding the right sleeve for his pant leg when getting dressed  May have some \"dressing apraxia    Loses things quite a lot  Repeats himself a lot    Seems to be somewhat uncoordinated spilled his coffee and water all the time    Balance seems to be getting worse    More confused    Somewhere along the path he was placed May be on Exelon patch had hallucinations  Did not do well so that was stopped    Patient was in the ER 6/22/2022  Patient had gastrointestinal problems  And has recurrence of lung cancer  Is on Eliquis for his A. Fib  Has duodenal angiodysplasia  Has sick sinus syndrome and has a pacemaker    Multiple medical issues      Discussed with the patient's wife that every time he is sick or has other medical issues his neurologic condition " seems to deteriorate further      In the past when evaluated  Side effects from Aricept December 2021 dizziness diarrhea upset stomach  Primary stopped med Aricept  Primary stop the Preva gin and the Aricept  We talked about Aricept can sometimes cause GI distress heartburn diarrhea and lightheadedness  He feels that he might be better off of it    Patient also stopped his Prevagin which he is convinced helps him he likes the ads on TV  I will leave up to him whether he wants to go back on it but I am not so sure that it is helpful    Does have a very hesitant type gait  Is always careful low  Has the length dependent neuropathy with burning feet symptoms          A.  Dementia       With REM sleep disorder       Onset as far back as [id number]       Original work-up by Dr. 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       It could be a combination of the above       Could be a atypical Alzheimer's       I do not feel that further testing for tile or amyloid with special scans would change the way we treat thanks        Did have a discussion with patient and family about this    He does exercise and finds that it makes his feet feel better this is good for his dementia though also he rides the exercise bike but then his feet get hot puts them in cold water but only for a few minutes warned him about frostbite or over freezing the feet which could make things worse he can use counter stimulation such as a stocking or massage to help if there are pins-and-needles but for the temperature changes be careful if he is using the cold water    Patient lives with wife  Rediscussed that I did not think it was a good idea that he  drives  Reviewed the 2 driving test 5/10/2019, and February 15, 2019.  In the past  He is driving under special circumstances no highway only during daytime limited routes  Drives to the gas station  [facility]  I still feel that it be better that he bows out from driving and rediscussed my concerns      Currently does activities of daily living okay gets cleaned up dressed changes  Eats okay swallows okay  With neuropathy has difficulty ambulating on uneven surfaces  Sometimes gets lightheaded if he gets up too quick  Does not seem to have the REM sleep difficulties now on the melatonin  May have some vivid dreams but cannot really remember them  No true hallucinations  Does help with some chores around the house wife does the cooking      He is on Eliquis  Had a endoscopy and a colonoscopy in the past due to dropping hemoglobin  GI thought maybe there is some bleeding in the small intestine  Has been to have a pill camera  They are tracking his hemoglobin closely    Currently now  No nausea no vomiting   No GERD  No diarrhea  Times gets lightheaded if he gets up quick but his wife says he moves too fast most the time if he moves slowly do better  Does not need a cane for gait  Talked about gait safety low at length and especially with winter coming up  Likes to watch a lot of politics he says he does not get frustrated with that    No other new medications            B.  Length dependent axonal neuropathy       Previously worked up by Dr. Lucia Beckford at ????         Patient has difficulty with neuropathy       It affects his gait and balance was reviewed at length about safety       Still ambulating without a cane although said that when he is outside sometimes he might use a walking stick       Is up out of the chair okay         Does have difficulty with dysesthesias and restless leg type symptoms which make it difficult to sleep much better with the Lyrica       When he standing in  Hindu he gets a little lightheaded prior little bit of autonomic instability discussed with patient and family members         On Lyrica 100 mg 3 times daily for his neuropathy would not go up any higher on this    C.  Past traumatic subdural hematoma secondary to fall in         EEG showed past right frontal slowing with possible right frontal sharp wave    D.  History of lung cancer diagnosed 2017    E.  Some swallowing difficulty        Does have some missing teeth which makes chewing difficult but no swallowing problems        Past neurologic  history  1. Onset of dementia cognitive difficulties as far back as   2. Diagnosed with dementia and   3. Axonal loss neuropathy going on for quite a few years more painful burning feet  4. Benign prostatic hypertrophy  5. Lung cancer 2017  6. Subdural hematoma   7.  REM sleep symptoms  8.  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 MRI scan of the brain in , mild atrophy, mild small vessel changes.  2. MRI of the cervical spine in , no significant cord impingement done at [facility].  3. Reference is to normal TSH and B12, and glucoses in the past, but I do not have all the numbers.  4. MRI scan in , slightly increased ventricles greater  than atrophy, question NPH, small vessel changes, but gait is not consistent with NPH.              MRI scan head, 1/16/2019,              Unchanged lateral and third ventriculomegaly disproportionate to degree of sulci prominence              Volume loss bilateral hippocampal left greater than the right              Mild chronic small vessel changes  5. Reference to an EEG in the past showing right frontal slowing, mild with some questionable right frontal sharp wave.              EEG 2/26/2019, 9-10 Hz posterior dominant rhythm subtle theta disorganization no epileptiform activity  6. Mini-mental status score 22/30, July 2017.  7. Neuropsychometric testing formal in the past, May 15, 2017 compared to 2014 showing severe changes with:  a. Processing speed severely impaired.              b. Visuospatial severely impaired.              c. Memory is moderately impaired.              d. Executive functioning decreased compared to 2014.  8.        MoCA            12/17/2018, 12 out of 30            5/10/2021,    8 out of 30    9.       's evaluation March 2019 from [facility] stated patient should not drive            's evaluation [facility] may 2019 stated patient should not drive  10.     B12 433, TSH 1.19, (February 2021)  TSH 1.19, B12 433 (February 2021)  Laboratory data April 5, 2021  White blood count 8.1, hemoglobin 13.5, platelets are 50,000  Sodium 143 potassium 4.8  BUN 19 creatinine 0.9  Glucose 116        Exam review    Pertinent positives  Hearing loss of at least 70% on the left  Dysesthesias of the legs from his neuropathy  Balance trouble because of the neuropathy    No headache no chest pain no shortness of breath  No diplopia dysarthria dysphagia  No new focal weakness    Off the Aricept  No GERD no diarrhea    Still may get a little bit of lightheadedness and somewhat ataxic    No bowel or bladder difficulties that are new  No black tarry stools no blood in the stool     The REM  sleep changes are rare vivid dreams but no true hallucinations    Otherwise review of systems negative    General exam  Blood pressure 132/61, pulse 79  HEENT significant for significant hearing loss  Lungs clear  Heart rate regular  Abdomen soft positive bowel sounds  Symmetrical pulses  No edema in the feet    Neurologic exam   alert and attentive  Formal [place] 8 out of 30 but is hard of hearing (May 2021)  Past MoCA 12 out of 30 2018    No aphasia  No neglect    Cranials 2 through 12 significant for  Marked decreased hearing  No ophthalmoplegia  No nystagmus  Visual fields intact  Face symmetrical      Upper extremities  No drift  No tremor  Normal finger-nose    Gait and lower extremities  Gets out of the chair uses one-handed pushoff but gets up fairly quickly  Ambulates around the room without an assistive device  No specific gait apraxia    Would do better with a cane or walking stick  Gait is just a little bit hesitant          Impression plan    1.  Alzheimer's dementia ( G30.1 )        Aricept stopped in December 2021 had some GI symptoms diarrhea made a little bit lightheadedness        History of some mild lightheadedness/autonomic problems monitoring        Has some history of mild REM sleep changes not severe        Melatonin 10 mg once at nighttime for REM sleep difficulties       2 driving evaluations in spring 2019 stated patient should be a nondriver.       MoCA 12 out of 30, December 2018.       MoCA 8 out of 30, May 10, 2021        We discussed actual type of dementia could be an atypical Alzheimer's to the slow course could have a component of chronic traumatic encephalopathy did have some REM sleep difficulties but does not seem to be typical of Lewy body as they seem to be easier to control and less bad now discussed that further testing with special scans would not change the treatment at this time  Dementia    Had side effects to Aricept and had side effects to other cholinergic meds with  hallucinations so they were stopped         2.  Axonal neuropathy ( G62.89 )       Has neuropathic pain       Better controlled with Lyrica 100 mg p.o. 3 times daily      3.   Previous MRI scans with ventricles out of proportion to atrophy, static hydrocephalus        Has not had typical gait did not feel he was a good shunt candidate        Has hippocampal atrophy left greater than right on MRI        MoCA 12 out of 30 in 2018.    4.  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 )       Melatonin 10 mg at nighttime      Plan  No driving    Off the Aricept now off all cholinergic meds due to some hallucinations seems to do better off of the meds    Lyrica 100 mg 3 times daily, getting through [employer] through paperwork filled out by his primary    Melatonin 10 mg once per day    Talked about gait safety with his mild ataxia from his neuropathy  Talked about good foot care  Seems that he is  able to interact better than his MoCA testing would imply  Off the Aricept due to GI symptoms stopped December 2021    Follow-up in 6 months      Discussed multiple issues with patient and wife above  Concern that he continues to decline and she is going to need increased help to care for him she has thought about this  Talked about activities of daily living/advanced directive/caregiver burnout    Total care time today 32 minutes      Again, thank you for allowing me to participate in the care of your patient.        Sincerely,        Tomi Hill MD

## 2023-01-01 ENCOUNTER — DOCUMENTATION ONLY (OUTPATIENT)
Dept: CARDIOLOGY | Facility: CLINIC | Age: 88
End: 2023-01-01

## 2023-01-01 ENCOUNTER — APPOINTMENT (OUTPATIENT)
Dept: RADIOLOGY | Facility: HOSPITAL | Age: 88
DRG: 552 | End: 2023-01-01
Attending: PHYSICIAN ASSISTANT
Payer: COMMERCIAL

## 2023-01-01 ENCOUNTER — APPOINTMENT (OUTPATIENT)
Dept: PHYSICAL THERAPY | Facility: HOSPITAL | Age: 88
DRG: 552 | End: 2023-01-01
Payer: COMMERCIAL

## 2023-01-01 ENCOUNTER — HOSPITAL ENCOUNTER (INPATIENT)
Facility: HOSPITAL | Age: 88
LOS: 38 days | Discharge: NURSING, REHAB OR LONG-TERM CARE | DRG: 552 | End: 2023-11-08
Attending: EMERGENCY MEDICINE | Admitting: INTERNAL MEDICINE
Payer: COMMERCIAL

## 2023-01-01 ENCOUNTER — APPOINTMENT (OUTPATIENT)
Dept: RADIOLOGY | Facility: HOSPITAL | Age: 88
DRG: 552 | End: 2023-01-01
Attending: EMERGENCY MEDICINE
Payer: COMMERCIAL

## 2023-01-01 ENCOUNTER — APPOINTMENT (OUTPATIENT)
Dept: MRI IMAGING | Facility: HOSPITAL | Age: 88
DRG: 552 | End: 2023-01-01
Attending: EMERGENCY MEDICINE
Payer: COMMERCIAL

## 2023-01-01 ENCOUNTER — APPOINTMENT (OUTPATIENT)
Dept: CT IMAGING | Facility: HOSPITAL | Age: 88
DRG: 552 | End: 2023-01-01
Attending: EMERGENCY MEDICINE
Payer: COMMERCIAL

## 2023-01-01 ENCOUNTER — APPOINTMENT (OUTPATIENT)
Dept: PHYSICAL THERAPY | Facility: HOSPITAL | Age: 88
DRG: 552 | End: 2023-01-01
Attending: INTERNAL MEDICINE
Payer: COMMERCIAL

## 2023-01-01 ENCOUNTER — APPOINTMENT (OUTPATIENT)
Dept: OCCUPATIONAL THERAPY | Facility: HOSPITAL | Age: 88
DRG: 552 | End: 2023-01-01
Attending: INTERNAL MEDICINE
Payer: COMMERCIAL

## 2023-01-01 VITALS
TEMPERATURE: 98.5 F | RESPIRATION RATE: 18 BRPM | HEART RATE: 93 BPM | OXYGEN SATURATION: 96 % | SYSTOLIC BLOOD PRESSURE: 162 MMHG | DIASTOLIC BLOOD PRESSURE: 78 MMHG | HEIGHT: 76 IN | BODY MASS INDEX: 17.99 KG/M2 | WEIGHT: 147.71 LBS

## 2023-01-01 DIAGNOSIS — G62.89 AXONAL NEUROPATHY: ICD-10-CM

## 2023-01-01 DIAGNOSIS — R45.1 AGITATION: ICD-10-CM

## 2023-01-01 DIAGNOSIS — I10 PRIMARY HYPERTENSION: ICD-10-CM

## 2023-01-01 DIAGNOSIS — S12.691A OTHER CLOSED NONDISPLACED FRACTURE OF SEVENTH CERVICAL VERTEBRA, INITIAL ENCOUNTER (H): Primary | ICD-10-CM

## 2023-01-01 DIAGNOSIS — G31.83 SEVERE LEWY BODY DEMENTIA WITH OTHER BEHAVIORAL DISTURBANCE (H): ICD-10-CM

## 2023-01-01 DIAGNOSIS — S12.601A CLOSED NONDISPLACED FRACTURE OF SEVENTH CERVICAL VERTEBRA, UNSPECIFIED FRACTURE MORPHOLOGY, INITIAL ENCOUNTER (H): ICD-10-CM

## 2023-01-01 DIAGNOSIS — F02.C18 SEVERE LEWY BODY DEMENTIA WITH OTHER BEHAVIORAL DISTURBANCE (H): ICD-10-CM

## 2023-01-01 LAB
ANION GAP SERPL CALCULATED.3IONS-SCNC: 10 MMOL/L (ref 7–15)
ANION GAP SERPL CALCULATED.3IONS-SCNC: 12 MMOL/L (ref 7–15)
ANION GAP SERPL CALCULATED.3IONS-SCNC: 8 MMOL/L (ref 7–15)
BASOPHILS # BLD AUTO: 0.1 10E3/UL (ref 0–0.2)
BASOPHILS NFR BLD AUTO: 1 %
BUN SERPL-MCNC: 26.1 MG/DL (ref 8–23)
BUN SERPL-MCNC: 31.1 MG/DL (ref 8–23)
BUN SERPL-MCNC: 38.2 MG/DL (ref 8–23)
CALCIUM SERPL-MCNC: 8.8 MG/DL (ref 8.8–10.2)
CALCIUM SERPL-MCNC: 8.9 MG/DL (ref 8.8–10.2)
CALCIUM SERPL-MCNC: 8.9 MG/DL (ref 8.8–10.2)
CHLORIDE SERPL-SCNC: 104 MMOL/L (ref 98–107)
CHLORIDE SERPL-SCNC: 105 MMOL/L (ref 98–107)
CHLORIDE SERPL-SCNC: 107 MMOL/L (ref 98–107)
CREAT SERPL-MCNC: 0.76 MG/DL (ref 0.67–1.17)
CREAT SERPL-MCNC: 0.81 MG/DL (ref 0.67–1.17)
CREAT SERPL-MCNC: 0.88 MG/DL (ref 0.67–1.17)
CREAT SERPL-MCNC: 1.14 MG/DL (ref 0.67–1.17)
DEPRECATED HCO3 PLAS-SCNC: 24 MMOL/L (ref 22–29)
DEPRECATED HCO3 PLAS-SCNC: 27 MMOL/L (ref 22–29)
DEPRECATED HCO3 PLAS-SCNC: 28 MMOL/L (ref 22–29)
EGFRCR SERPLBLD CKD-EPI 2021: 63 ML/MIN/1.73M2
EGFRCR SERPLBLD CKD-EPI 2021: 84 ML/MIN/1.73M2
EGFRCR SERPLBLD CKD-EPI 2021: 86 ML/MIN/1.73M2
EGFRCR SERPLBLD CKD-EPI 2021: 88 ML/MIN/1.73M2
EOSINOPHIL # BLD AUTO: 0.1 10E3/UL (ref 0–0.7)
EOSINOPHIL NFR BLD AUTO: 1 %
ERYTHROCYTE [DISTWIDTH] IN BLOOD BY AUTOMATED COUNT: 13.7 % (ref 10–15)
ERYTHROCYTE [DISTWIDTH] IN BLOOD BY AUTOMATED COUNT: 13.7 % (ref 10–15)
ERYTHROCYTE [DISTWIDTH] IN BLOOD BY AUTOMATED COUNT: 13.8 % (ref 10–15)
GLUCOSE BLDC GLUCOMTR-MCNC: 114 MG/DL (ref 70–99)
GLUCOSE BLDC GLUCOMTR-MCNC: 119 MG/DL (ref 70–99)
GLUCOSE BLDC GLUCOMTR-MCNC: 124 MG/DL (ref 70–99)
GLUCOSE BLDC GLUCOMTR-MCNC: 124 MG/DL (ref 70–99)
GLUCOSE BLDC GLUCOMTR-MCNC: 126 MG/DL (ref 70–99)
GLUCOSE BLDC GLUCOMTR-MCNC: 92 MG/DL (ref 70–99)
GLUCOSE BLDC GLUCOMTR-MCNC: 98 MG/DL (ref 70–99)
GLUCOSE SERPL-MCNC: 106 MG/DL (ref 70–99)
GLUCOSE SERPL-MCNC: 86 MG/DL (ref 70–99)
GLUCOSE SERPL-MCNC: 99 MG/DL (ref 70–99)
HCT VFR BLD AUTO: 36 % (ref 40–53)
HCT VFR BLD AUTO: 38.2 % (ref 40–53)
HCT VFR BLD AUTO: 40.5 % (ref 40–53)
HGB BLD-MCNC: 11.8 G/DL (ref 13.3–17.7)
HGB BLD-MCNC: 12.3 G/DL (ref 13.3–17.7)
HGB BLD-MCNC: 13.2 G/DL (ref 13.3–17.7)
HOLD SPECIMEN: NORMAL
IMM GRANULOCYTES # BLD: 0 10E3/UL
IMM GRANULOCYTES NFR BLD: 0 %
LYMPHOCYTES # BLD AUTO: 1.7 10E3/UL (ref 0.8–5.3)
LYMPHOCYTES NFR BLD AUTO: 16 %
MCH RBC QN AUTO: 30.6 PG (ref 26.5–33)
MCH RBC QN AUTO: 30.8 PG (ref 26.5–33)
MCH RBC QN AUTO: 31.1 PG (ref 26.5–33)
MCHC RBC AUTO-ENTMCNC: 32.2 G/DL (ref 31.5–36.5)
MCHC RBC AUTO-ENTMCNC: 32.6 G/DL (ref 31.5–36.5)
MCHC RBC AUTO-ENTMCNC: 32.8 G/DL (ref 31.5–36.5)
MCV RBC AUTO: 94 FL (ref 78–100)
MCV RBC AUTO: 96 FL (ref 78–100)
MCV RBC AUTO: 96 FL (ref 78–100)
MONOCYTES # BLD AUTO: 0.9 10E3/UL (ref 0–1.3)
MONOCYTES NFR BLD AUTO: 8 %
NEUTROPHILS # BLD AUTO: 8.1 10E3/UL (ref 1.6–8.3)
NEUTROPHILS NFR BLD AUTO: 74 %
NRBC # BLD AUTO: 0 10E3/UL
NRBC BLD AUTO-RTO: 0 /100
PLATELET # BLD AUTO: 236 10E3/UL (ref 150–450)
PLATELET # BLD AUTO: 237 10E3/UL (ref 150–450)
PLATELET # BLD AUTO: 285 10E3/UL (ref 150–450)
PLATELET # BLD AUTO: 314 10E3/UL (ref 150–450)
PLATELET # BLD AUTO: 369 10E3/UL (ref 150–450)
POTASSIUM SERPL-SCNC: 4.2 MMOL/L (ref 3.4–5.3)
POTASSIUM SERPL-SCNC: 4.4 MMOL/L (ref 3.4–5.3)
POTASSIUM SERPL-SCNC: 4.4 MMOL/L (ref 3.4–5.3)
RBC # BLD AUTO: 3.85 10E6/UL (ref 4.4–5.9)
RBC # BLD AUTO: 3.99 10E6/UL (ref 4.4–5.9)
RBC # BLD AUTO: 4.24 10E6/UL (ref 4.4–5.9)
SODIUM SERPL-SCNC: 141 MMOL/L (ref 135–145)
SODIUM SERPL-SCNC: 141 MMOL/L (ref 135–145)
SODIUM SERPL-SCNC: 143 MMOL/L (ref 135–145)
WBC # BLD AUTO: 10.8 10E3/UL (ref 4–11)
WBC # BLD AUTO: 7.8 10E3/UL (ref 4–11)
WBC # BLD AUTO: 9.1 10E3/UL (ref 4–11)

## 2023-01-01 PROCEDURE — 97162 PT EVAL MOD COMPLEX 30 MIN: CPT | Mod: GP

## 2023-01-01 PROCEDURE — 250N000011 HC RX IP 250 OP 636: Mod: JZ | Performed by: INTERNAL MEDICINE

## 2023-01-01 PROCEDURE — 99231 SBSQ HOSP IP/OBS SF/LOW 25: CPT | Performed by: INTERNAL MEDICINE

## 2023-01-01 PROCEDURE — 250N000013 HC RX MED GY IP 250 OP 250 PS 637: Performed by: INTERNAL MEDICINE

## 2023-01-01 PROCEDURE — 96376 TX/PRO/DX INJ SAME DRUG ADON: CPT

## 2023-01-01 PROCEDURE — 120N000001 HC R&B MED SURG/OB

## 2023-01-01 PROCEDURE — 97530 THERAPEUTIC ACTIVITIES: CPT | Mod: GP

## 2023-01-01 PROCEDURE — 99232 SBSQ HOSP IP/OBS MODERATE 35: CPT | Performed by: STUDENT IN AN ORGANIZED HEALTH CARE EDUCATION/TRAINING PROGRAM

## 2023-01-01 PROCEDURE — 250N000011 HC RX IP 250 OP 636: Mod: JZ | Performed by: STUDENT IN AN ORGANIZED HEALTH CARE EDUCATION/TRAINING PROGRAM

## 2023-01-01 PROCEDURE — G0378 HOSPITAL OBSERVATION PER HR: HCPCS

## 2023-01-01 PROCEDURE — 250N000013 HC RX MED GY IP 250 OP 250 PS 637: Performed by: STUDENT IN AN ORGANIZED HEALTH CARE EDUCATION/TRAINING PROGRAM

## 2023-01-01 PROCEDURE — 250N000013 HC RX MED GY IP 250 OP 250 PS 637: Performed by: REGISTERED NURSE

## 2023-01-01 PROCEDURE — 99232 SBSQ HOSP IP/OBS MODERATE 35: CPT | Performed by: INTERNAL MEDICINE

## 2023-01-01 PROCEDURE — 99207 PR CDG-CUT & PASTE-POTENTIAL IMPACT ON LEVEL: CPT | Performed by: INTERNAL MEDICINE

## 2023-01-01 PROCEDURE — 250N000013 HC RX MED GY IP 250 OP 250 PS 637: Performed by: EMERGENCY MEDICINE

## 2023-01-01 PROCEDURE — 72040 X-RAY EXAM NECK SPINE 2-3 VW: CPT

## 2023-01-01 PROCEDURE — 99285 EMERGENCY DEPT VISIT HI MDM: CPT

## 2023-01-01 PROCEDURE — 85049 AUTOMATED PLATELET COUNT: CPT | Performed by: INTERNAL MEDICINE

## 2023-01-01 PROCEDURE — 250N000011 HC RX IP 250 OP 636: Mod: JZ

## 2023-01-01 PROCEDURE — 250N000011 HC RX IP 250 OP 636: Performed by: INTERNAL MEDICINE

## 2023-01-01 PROCEDURE — 85014 HEMATOCRIT: CPT | Performed by: INTERNAL MEDICINE

## 2023-01-01 PROCEDURE — 96375 TX/PRO/DX INJ NEW DRUG ADDON: CPT

## 2023-01-01 PROCEDURE — 999N000111 HC STATISTIC OT IP EVAL DEFER

## 2023-01-01 PROCEDURE — 258N000003 HC RX IP 258 OP 636: Performed by: INTERNAL MEDICINE

## 2023-01-01 PROCEDURE — 73070 X-RAY EXAM OF ELBOW: CPT | Mod: LT

## 2023-01-01 PROCEDURE — 250N000011 HC RX IP 250 OP 636: Performed by: EMERGENCY MEDICINE

## 2023-01-01 PROCEDURE — 97110 THERAPEUTIC EXERCISES: CPT | Mod: GP

## 2023-01-01 PROCEDURE — 80048 BASIC METABOLIC PNL TOTAL CA: CPT | Performed by: EMERGENCY MEDICINE

## 2023-01-01 PROCEDURE — 684N000002 HC FULL TRAUMA W/O CC LEVEL IV

## 2023-01-01 PROCEDURE — 70450 CT HEAD/BRAIN W/O DYE: CPT

## 2023-01-01 PROCEDURE — 3E02340 INTRODUCTION OF INFLUENZA VACCINE INTO MUSCLE, PERCUTANEOUS APPROACH: ICD-10-PCS | Performed by: INTERNAL MEDICINE

## 2023-01-01 PROCEDURE — 99222 1ST HOSP IP/OBS MODERATE 55: CPT | Performed by: REGISTERED NURSE

## 2023-01-01 PROCEDURE — 97116 GAIT TRAINING THERAPY: CPT | Mod: GP

## 2023-01-01 PROCEDURE — 80048 BASIC METABOLIC PNL TOTAL CA: CPT | Performed by: INTERNAL MEDICINE

## 2023-01-01 PROCEDURE — 96372 THER/PROPH/DIAG INJ SC/IM: CPT | Performed by: EMERGENCY MEDICINE

## 2023-01-01 PROCEDURE — 36415 COLL VENOUS BLD VENIPUNCTURE: CPT | Performed by: INTERNAL MEDICINE

## 2023-01-01 PROCEDURE — 85025 COMPLETE CBC W/AUTO DIFF WBC: CPT | Performed by: EMERGENCY MEDICINE

## 2023-01-01 PROCEDURE — 96374 THER/PROPH/DIAG INJ IV PUSH: CPT

## 2023-01-01 PROCEDURE — 96372 THER/PROPH/DIAG INJ SC/IM: CPT | Performed by: INTERNAL MEDICINE

## 2023-01-01 PROCEDURE — 85049 AUTOMATED PLATELET COUNT: CPT | Performed by: STUDENT IN AN ORGANIZED HEALTH CARE EDUCATION/TRAINING PROGRAM

## 2023-01-01 PROCEDURE — 72125 CT NECK SPINE W/O DYE: CPT

## 2023-01-01 PROCEDURE — 99231 SBSQ HOSP IP/OBS SF/LOW 25: CPT | Performed by: STUDENT IN AN ORGANIZED HEALTH CARE EDUCATION/TRAINING PROGRAM

## 2023-01-01 PROCEDURE — 82565 ASSAY OF CREATININE: CPT | Performed by: STUDENT IN AN ORGANIZED HEALTH CARE EDUCATION/TRAINING PROGRAM

## 2023-01-01 PROCEDURE — 72141 MRI NECK SPINE W/O DYE: CPT

## 2023-01-01 PROCEDURE — 36415 COLL VENOUS BLD VENIPUNCTURE: CPT | Performed by: STUDENT IN AN ORGANIZED HEALTH CARE EDUCATION/TRAINING PROGRAM

## 2023-01-01 PROCEDURE — 82962 GLUCOSE BLOOD TEST: CPT

## 2023-01-01 PROCEDURE — G0008 ADMIN INFLUENZA VIRUS VAC: HCPCS | Performed by: STUDENT IN AN ORGANIZED HEALTH CARE EDUCATION/TRAINING PROGRAM

## 2023-01-01 PROCEDURE — 71045 X-RAY EXAM CHEST 1 VIEW: CPT

## 2023-01-01 PROCEDURE — 90662 IIV NO PRSV INCREASED AG IM: CPT | Performed by: STUDENT IN AN ORGANIZED HEALTH CARE EDUCATION/TRAINING PROGRAM

## 2023-01-01 PROCEDURE — 99239 HOSP IP/OBS DSCHRG MGMT >30: CPT | Performed by: STUDENT IN AN ORGANIZED HEALTH CARE EDUCATION/TRAINING PROGRAM

## 2023-01-01 PROCEDURE — 99223 1ST HOSP IP/OBS HIGH 75: CPT | Performed by: INTERNAL MEDICINE

## 2023-01-01 PROCEDURE — 73060 X-RAY EXAM OF HUMERUS: CPT | Mod: LT

## 2023-01-01 PROCEDURE — 250N000011 HC RX IP 250 OP 636: Performed by: STUDENT IN AN ORGANIZED HEALTH CARE EDUCATION/TRAINING PROGRAM

## 2023-01-01 PROCEDURE — 36415 COLL VENOUS BLD VENIPUNCTURE: CPT | Performed by: EMERGENCY MEDICINE

## 2023-01-01 PROCEDURE — 250N000013 HC RX MED GY IP 250 OP 250 PS 637

## 2023-01-01 RX ORDER — OLANZAPINE 10 MG/2ML
2.5 INJECTION, POWDER, FOR SOLUTION INTRAMUSCULAR 2 TIMES DAILY PRN
Status: DISCONTINUED | OUTPATIENT
Start: 2023-01-01 | End: 2023-01-01 | Stop reason: HOSPADM

## 2023-01-01 RX ORDER — RAMELTEON 8 MG/1
TABLET ORAL
Start: 2023-01-01

## 2023-01-01 RX ORDER — ACETAMINOPHEN 500 MG
1000 TABLET ORAL 3 TIMES DAILY
Status: ON HOLD | COMMUNITY
End: 2023-01-01

## 2023-01-01 RX ORDER — ACETAMINOPHEN 325 MG/1
975 TABLET ORAL EVERY 8 HOURS PRN
Status: DISCONTINUED | OUTPATIENT
Start: 2023-01-01 | End: 2023-01-01 | Stop reason: HOSPADM

## 2023-01-01 RX ORDER — AMLODIPINE BESYLATE 5 MG/1
5 TABLET ORAL DAILY
Status: DISCONTINUED | OUTPATIENT
Start: 2023-01-01 | End: 2023-01-01

## 2023-01-01 RX ORDER — HALOPERIDOL 5 MG/ML
2 INJECTION INTRAMUSCULAR EVERY 6 HOURS PRN
Status: DISCONTINUED | OUTPATIENT
Start: 2023-01-01 | End: 2023-01-01

## 2023-01-01 RX ORDER — NALOXONE HYDROCHLORIDE 0.4 MG/ML
0.2 INJECTION, SOLUTION INTRAMUSCULAR; INTRAVENOUS; SUBCUTANEOUS
Status: DISCONTINUED | OUTPATIENT
Start: 2023-01-01 | End: 2023-01-01

## 2023-01-01 RX ORDER — POLYETHYLENE GLYCOL 3350 17 G/17G
17 POWDER, FOR SOLUTION ORAL 2 TIMES DAILY
Status: DISCONTINUED | OUTPATIENT
Start: 2023-01-01 | End: 2023-01-01

## 2023-01-01 RX ORDER — QUETIAPINE FUMARATE 25 MG/1
50 TABLET, FILM COATED ORAL AT BEDTIME
Status: DISCONTINUED | OUTPATIENT
Start: 2023-01-01 | End: 2023-01-01 | Stop reason: HOSPADM

## 2023-01-01 RX ORDER — QUETIAPINE FUMARATE 25 MG/1
25 TABLET, FILM COATED ORAL 2 TIMES DAILY
Status: DISCONTINUED | OUTPATIENT
Start: 2023-01-01 | End: 2023-01-01 | Stop reason: HOSPADM

## 2023-01-01 RX ORDER — OLANZAPINE 10 MG/2ML
2.5 INJECTION, POWDER, FOR SOLUTION INTRAMUSCULAR 2 TIMES DAILY PRN
Status: DISCONTINUED | OUTPATIENT
Start: 2023-01-01 | End: 2023-01-01

## 2023-01-01 RX ORDER — CARBIDOPA/LEVODOPA 10MG-100MG
TABLET ORAL
Start: 2023-01-01

## 2023-01-01 RX ORDER — LORAZEPAM 0.5 MG/1
1 TABLET ORAL ONCE
Status: COMPLETED | OUTPATIENT
Start: 2023-01-01 | End: 2023-01-01

## 2023-01-01 RX ORDER — LORAZEPAM 2 MG/ML
.5-1 INJECTION INTRAMUSCULAR EVERY 6 HOURS PRN
Status: DISCONTINUED | OUTPATIENT
Start: 2023-01-01 | End: 2023-01-01

## 2023-01-01 RX ORDER — POLYETHYLENE GLYCOL 3350 17 G/17G
1 POWDER, FOR SOLUTION ORAL 2 TIMES DAILY
COMMUNITY

## 2023-01-01 RX ORDER — QUETIAPINE FUMARATE 50 MG/1
50 TABLET, FILM COATED ORAL AT BEDTIME
COMMUNITY

## 2023-01-01 RX ORDER — LIDOCAINE 4 G/G
1 PATCH TOPICAL
Status: DISCONTINUED | OUTPATIENT
Start: 2023-01-01 | End: 2023-01-01 | Stop reason: HOSPADM

## 2023-01-01 RX ORDER — QUETIAPINE FUMARATE 25 MG/1
25 TABLET, FILM COATED ORAL 2 TIMES DAILY PRN
Status: DISCONTINUED | OUTPATIENT
Start: 2023-01-01 | End: 2023-01-01 | Stop reason: HOSPADM

## 2023-01-01 RX ORDER — HALOPERIDOL 5 MG/ML
2 INJECTION INTRAMUSCULAR ONCE
Status: DISCONTINUED | OUTPATIENT
Start: 2023-01-01 | End: 2023-01-01

## 2023-01-01 RX ORDER — QUETIAPINE FUMARATE 25 MG/1
25 TABLET, FILM COATED ORAL 2 TIMES DAILY
Start: 2023-01-01

## 2023-01-01 RX ORDER — AMLODIPINE BESYLATE 2.5 MG/1
2.5 TABLET ORAL ONCE
Status: COMPLETED | OUTPATIENT
Start: 2023-01-01 | End: 2023-01-01

## 2023-01-01 RX ORDER — LORAZEPAM 2 MG/ML
0.5 INJECTION INTRAMUSCULAR ONCE
Status: COMPLETED | OUTPATIENT
Start: 2023-01-01 | End: 2023-01-01

## 2023-01-01 RX ORDER — MULTIPLE VITAMINS W/ MINERALS TAB 9MG-400MCG
1 TAB ORAL DAILY
Status: DISCONTINUED | OUTPATIENT
Start: 2023-01-01 | End: 2023-01-01 | Stop reason: HOSPADM

## 2023-01-01 RX ORDER — ACETAMINOPHEN 325 MG/1
975 TABLET ORAL EVERY 8 HOURS
Status: DISCONTINUED | OUTPATIENT
Start: 2023-01-01 | End: 2023-01-01

## 2023-01-01 RX ORDER — ACETAMINOPHEN 500 MG
500-1000 TABLET ORAL DAILY PRN
COMMUNITY

## 2023-01-01 RX ORDER — AMLODIPINE BESYLATE 2.5 MG/1
2.5 TABLET ORAL 2 TIMES DAILY
Status: DISCONTINUED | OUTPATIENT
Start: 2023-01-01 | End: 2023-01-01

## 2023-01-01 RX ORDER — AMOXICILLIN 250 MG
2 CAPSULE ORAL 2 TIMES DAILY
Status: DISCONTINUED | OUTPATIENT
Start: 2023-01-01 | End: 2023-01-01 | Stop reason: HOSPADM

## 2023-01-01 RX ORDER — ENOXAPARIN SODIUM 100 MG/ML
40 INJECTION SUBCUTANEOUS EVERY 24 HOURS
Status: DISCONTINUED | OUTPATIENT
Start: 2023-01-01 | End: 2023-01-01 | Stop reason: HOSPADM

## 2023-01-01 RX ORDER — RAMELTEON 8 MG/1
8 TABLET ORAL AT BEDTIME
Status: ON HOLD | COMMUNITY
End: 2023-01-01

## 2023-01-01 RX ORDER — TRAZODONE HYDROCHLORIDE 100 MG/1
100 TABLET ORAL AT BEDTIME
COMMUNITY

## 2023-01-01 RX ORDER — NALOXONE HYDROCHLORIDE 0.4 MG/ML
0.4 INJECTION, SOLUTION INTRAMUSCULAR; INTRAVENOUS; SUBCUTANEOUS
Status: DISCONTINUED | OUTPATIENT
Start: 2023-01-01 | End: 2023-01-01

## 2023-01-01 RX ORDER — AMLODIPINE BESYLATE 2.5 MG/1
2.5 TABLET ORAL 2 TIMES DAILY
Status: DISCONTINUED | OUTPATIENT
Start: 2023-01-01 | End: 2023-01-01 | Stop reason: HOSPADM

## 2023-01-01 RX ORDER — QUETIAPINE FUMARATE 25 MG/1
25 TABLET, FILM COATED ORAL 2 TIMES DAILY PRN
Status: DISCONTINUED | OUTPATIENT
Start: 2023-01-01 | End: 2023-01-01

## 2023-01-01 RX ORDER — OLANZAPINE 10 MG/2ML
5 INJECTION, POWDER, FOR SOLUTION INTRAMUSCULAR
Status: COMPLETED | OUTPATIENT
Start: 2023-01-01 | End: 2023-01-01

## 2023-01-01 RX ORDER — HYDROMORPHONE HCL IN WATER/PF 6 MG/30 ML
0.2 PATIENT CONTROLLED ANALGESIA SYRINGE INTRAVENOUS EVERY 6 HOURS
Status: COMPLETED | OUTPATIENT
Start: 2023-01-01 | End: 2023-01-01

## 2023-01-01 RX ORDER — AMOXICILLIN 250 MG
1 CAPSULE ORAL 2 TIMES DAILY
Status: DISCONTINUED | OUTPATIENT
Start: 2023-01-01 | End: 2023-01-01 | Stop reason: HOSPADM

## 2023-01-01 RX ORDER — QUETIAPINE FUMARATE 25 MG/1
25 TABLET, FILM COATED ORAL ONCE
Status: COMPLETED | OUTPATIENT
Start: 2023-01-01 | End: 2023-01-01

## 2023-01-01 RX ORDER — CARBIDOPA AND LEVODOPA 25; 100 MG/1; MG/1
1 TABLET ORAL 3 TIMES DAILY
Status: DISCONTINUED | OUTPATIENT
Start: 2023-01-01 | End: 2023-01-01

## 2023-01-01 RX ORDER — LORAZEPAM 2 MG/ML
0.5 INJECTION INTRAMUSCULAR EVERY 6 HOURS PRN
Status: DISCONTINUED | OUTPATIENT
Start: 2023-01-01 | End: 2023-01-01

## 2023-01-01 RX ORDER — QUETIAPINE FUMARATE 25 MG/1
25 TABLET, FILM COATED ORAL 2 TIMES DAILY PRN
COMMUNITY

## 2023-01-01 RX ORDER — CARBIDOPA AND LEVODOPA 25; 100 MG/1; MG/1
1 TABLET ORAL 3 TIMES DAILY
Status: ON HOLD | COMMUNITY
End: 2023-01-01

## 2023-01-01 RX ORDER — AMLODIPINE BESYLATE 2.5 MG/1
2.5 TABLET ORAL DAILY
Status: DISCONTINUED | OUTPATIENT
Start: 2023-01-01 | End: 2023-01-01

## 2023-01-01 RX ORDER — RAMELTEON 8 MG/1
8 TABLET ORAL AT BEDTIME
Start: 2023-01-01 | End: 2023-01-01

## 2023-01-01 RX ORDER — LATANOPROST 50 UG/ML
1 SOLUTION/ DROPS OPHTHALMIC AT BEDTIME
COMMUNITY

## 2023-01-01 RX ORDER — ACETAMINOPHEN 325 MG/1
650 TABLET ORAL ONCE
Status: COMPLETED | OUTPATIENT
Start: 2023-01-01 | End: 2023-01-01

## 2023-01-01 RX ORDER — LORAZEPAM 2 MG/ML
1 INJECTION INTRAMUSCULAR ONCE
Status: COMPLETED | OUTPATIENT
Start: 2023-01-01 | End: 2023-01-01

## 2023-01-01 RX ORDER — LORAZEPAM 2 MG/ML
1 INJECTION INTRAMUSCULAR
Status: COMPLETED | OUTPATIENT
Start: 2023-01-01 | End: 2023-01-01

## 2023-01-01 RX ORDER — HYDRALAZINE HYDROCHLORIDE 10 MG/1
20 TABLET, FILM COATED ORAL EVERY 6 HOURS PRN
Status: DISCONTINUED | OUTPATIENT
Start: 2023-01-01 | End: 2023-01-01 | Stop reason: HOSPADM

## 2023-01-01 RX ORDER — RAMELTEON 8 MG/1
8 TABLET ORAL AT BEDTIME
Status: DISCONTINUED | OUTPATIENT
Start: 2023-01-01 | End: 2023-01-01

## 2023-01-01 RX ORDER — AMLODIPINE BESYLATE 2.5 MG/1
2.5 TABLET ORAL 2 TIMES DAILY
Start: 2023-01-01

## 2023-01-01 RX ORDER — TRAZODONE HYDROCHLORIDE 50 MG/1
100 TABLET, FILM COATED ORAL AT BEDTIME
Status: DISCONTINUED | OUTPATIENT
Start: 2023-01-01 | End: 2023-01-01 | Stop reason: HOSPADM

## 2023-01-01 RX ORDER — PREGABALIN 25 MG/1
50 CAPSULE ORAL 2 TIMES DAILY
Status: DISCONTINUED | OUTPATIENT
Start: 2023-01-01 | End: 2023-01-01 | Stop reason: HOSPADM

## 2023-01-01 RX ORDER — AMOXICILLIN 250 MG
2 CAPSULE ORAL 2 TIMES DAILY
COMMUNITY

## 2023-01-01 RX ORDER — BISACODYL 5 MG
10 TABLET, DELAYED RELEASE (ENTERIC COATED) ORAL DAILY PRN
Status: DISCONTINUED | OUTPATIENT
Start: 2023-01-01 | End: 2023-01-01 | Stop reason: HOSPADM

## 2023-01-01 RX ORDER — CARBIDOPA/LEVODOPA 10MG-100MG
1 TABLET ORAL 3 TIMES DAILY
Status: DISCONTINUED | OUTPATIENT
Start: 2023-01-01 | End: 2023-01-01

## 2023-01-01 RX ORDER — ENOXAPARIN SODIUM 100 MG/ML
30 INJECTION SUBCUTANEOUS EVERY 24 HOURS
Status: DISCONTINUED | OUTPATIENT
Start: 2023-01-01 | End: 2023-01-01

## 2023-01-01 RX ORDER — SODIUM CHLORIDE 9 MG/ML
INJECTION, SOLUTION INTRAVENOUS CONTINUOUS
Status: ACTIVE | OUTPATIENT
Start: 2023-01-01 | End: 2023-01-01

## 2023-01-01 RX ORDER — ONDANSETRON 4 MG/1
4 TABLET, ORALLY DISINTEGRATING ORAL EVERY 6 HOURS PRN
Status: DISCONTINUED | OUTPATIENT
Start: 2023-01-01 | End: 2023-01-01

## 2023-01-01 RX ORDER — DORZOLAMIDE HYDROCHLORIDE AND TIMOLOL MALEATE 20; 5 MG/ML; MG/ML
1 SOLUTION/ DROPS OPHTHALMIC 2 TIMES DAILY
Status: DISCONTINUED | OUTPATIENT
Start: 2023-01-01 | End: 2023-01-01 | Stop reason: HOSPADM

## 2023-01-01 RX ORDER — HYDROMORPHONE HCL IN WATER/PF 6 MG/30 ML
0.2 PATIENT CONTROLLED ANALGESIA SYRINGE INTRAVENOUS EVERY 4 HOURS PRN
Status: DISCONTINUED | OUTPATIENT
Start: 2023-01-01 | End: 2023-01-01

## 2023-01-01 RX ORDER — ONDANSETRON 2 MG/ML
4 INJECTION INTRAMUSCULAR; INTRAVENOUS EVERY 6 HOURS PRN
Status: DISCONTINUED | OUTPATIENT
Start: 2023-01-01 | End: 2023-01-01

## 2023-01-01 RX ORDER — LATANOPROST 50 UG/ML
1 SOLUTION/ DROPS OPHTHALMIC AT BEDTIME
Status: DISCONTINUED | OUTPATIENT
Start: 2023-01-01 | End: 2023-01-01 | Stop reason: HOSPADM

## 2023-01-01 RX ORDER — ENOXAPARIN SODIUM 100 MG/ML
40 INJECTION SUBCUTANEOUS EVERY 24 HOURS
Status: DISCONTINUED | OUTPATIENT
Start: 2023-01-01 | End: 2023-01-01

## 2023-01-01 RX ORDER — CARBIDOPA/LEVODOPA 10MG-100MG
1 TABLET ORAL DAILY
Start: 2023-01-01 | End: 2023-01-01

## 2023-01-01 RX ORDER — HYDRALAZINE HYDROCHLORIDE 10 MG/1
20 TABLET, FILM COATED ORAL EVERY 6 HOURS PRN
Status: DISCONTINUED | OUTPATIENT
Start: 2023-01-01 | End: 2023-01-01

## 2023-01-01 RX ORDER — HYDRALAZINE HYDROCHLORIDE 20 MG/ML
10 INJECTION INTRAMUSCULAR; INTRAVENOUS EVERY 6 HOURS PRN
Status: DISCONTINUED | OUTPATIENT
Start: 2023-01-01 | End: 2023-01-01

## 2023-01-01 RX ORDER — ACETAMINOPHEN 325 MG/1
975 TABLET ORAL 3 TIMES DAILY
Status: DISCONTINUED | OUTPATIENT
Start: 2023-01-01 | End: 2023-01-01 | Stop reason: HOSPADM

## 2023-01-01 RX ORDER — PREGABALIN 50 MG/1
50 CAPSULE ORAL 2 TIMES DAILY
Qty: 180 CAPSULE | Refills: 3 | Status: SHIPPED | OUTPATIENT
Start: 2023-01-01

## 2023-01-01 RX ORDER — QUETIAPINE FUMARATE 25 MG/1
12.5 TABLET, FILM COATED ORAL 2 TIMES DAILY
Status: ON HOLD | COMMUNITY
End: 2023-01-01

## 2023-01-01 RX ORDER — PREGABALIN 50 MG/1
50 CAPSULE ORAL 2 TIMES DAILY
COMMUNITY
End: 2023-01-01

## 2023-01-01 RX ORDER — BISACODYL 5 MG
5 TABLET, DELAYED RELEASE (ENTERIC COATED) ORAL DAILY PRN
Status: DISCONTINUED | OUTPATIENT
Start: 2023-01-01 | End: 2023-01-01 | Stop reason: HOSPADM

## 2023-01-01 RX ORDER — AMOXICILLIN 250 MG
2 CAPSULE ORAL 2 TIMES DAILY
Status: DISCONTINUED | OUTPATIENT
Start: 2023-01-01 | End: 2023-01-01

## 2023-01-01 RX ADMIN — TRAZODONE HYDROCHLORIDE 100 MG: 50 TABLET ORAL at 22:02

## 2023-01-01 RX ADMIN — Medication 1 TABLET: at 09:09

## 2023-01-01 RX ADMIN — DORZOLAMIDE HYDROCHLORIDE AND TIMOLOL MALEATE 1 DROP: 22.3; 6.8 SOLUTION/ DROPS OPHTHALMIC at 20:59

## 2023-01-01 RX ADMIN — TRAZODONE HYDROCHLORIDE 100 MG: 50 TABLET ORAL at 20:03

## 2023-01-01 RX ADMIN — POLYETHYLENE GLYCOL 3350 17 G: 17 POWDER, FOR SOLUTION ORAL at 20:55

## 2023-01-01 RX ADMIN — Medication 12.5 MG: at 15:04

## 2023-01-01 RX ADMIN — ACETAMINOPHEN 975 MG: 325 TABLET, FILM COATED ORAL at 16:03

## 2023-01-01 RX ADMIN — LATANOPROST 1 DROP: 50 SOLUTION OPHTHALMIC at 20:45

## 2023-01-01 RX ADMIN — PREGABALIN 50 MG: 25 CAPSULE ORAL at 08:23

## 2023-01-01 RX ADMIN — SENNOSIDES AND DOCUSATE SODIUM 1 TABLET: 8.6; 5 TABLET ORAL at 09:39

## 2023-01-01 RX ADMIN — AMLODIPINE BESYLATE 2.5 MG: 2.5 TABLET ORAL at 20:08

## 2023-01-01 RX ADMIN — PREGABALIN 50 MG: 25 CAPSULE ORAL at 10:05

## 2023-01-01 RX ADMIN — CARBIDOPA AND LEVODOPA 1 HALF-TAB: 10; 100 TABLET ORAL at 14:00

## 2023-01-01 RX ADMIN — LATANOPROST 1 DROP: 50 SOLUTION OPHTHALMIC at 20:09

## 2023-01-01 RX ADMIN — PREGABALIN 50 MG: 25 CAPSULE ORAL at 20:02

## 2023-01-01 RX ADMIN — CARBIDOPA AND LEVODOPA 1 TABLET: 10; 100 TABLET ORAL at 08:01

## 2023-01-01 RX ADMIN — PREGABALIN 50 MG: 25 CAPSULE ORAL at 20:46

## 2023-01-01 RX ADMIN — ACETAMINOPHEN 975 MG: 325 TABLET, FILM COATED ORAL at 08:50

## 2023-01-01 RX ADMIN — AMLODIPINE BESYLATE 2.5 MG: 2.5 TABLET ORAL at 09:36

## 2023-01-01 RX ADMIN — OLANZAPINE 2.5 MG: 5 TABLET, ORALLY DISINTEGRATING ORAL at 21:55

## 2023-01-01 RX ADMIN — OLANZAPINE 2.5 MG: 5 TABLET, ORALLY DISINTEGRATING ORAL at 19:28

## 2023-01-01 RX ADMIN — DORZOLAMIDE HYDROCHLORIDE AND TIMOLOL MALEATE 1 DROP: 22.3; 6.8 SOLUTION/ DROPS OPHTHALMIC at 20:21

## 2023-01-01 RX ADMIN — SENNOSIDES AND DOCUSATE SODIUM 1 TABLET: 8.6; 5 TABLET ORAL at 08:59

## 2023-01-01 RX ADMIN — CARBIDOPA AND LEVODOPA 1 HALF-TAB: 10; 100 TABLET ORAL at 09:31

## 2023-01-01 RX ADMIN — QUETIAPINE FUMARATE 25 MG: 25 TABLET ORAL at 09:41

## 2023-01-01 RX ADMIN — ACETAMINOPHEN 975 MG: 325 TABLET, FILM COATED ORAL at 14:18

## 2023-01-01 RX ADMIN — CARBIDOPA AND LEVODOPA 1 HALF-TAB: 10; 100 TABLET ORAL at 20:03

## 2023-01-01 RX ADMIN — QUETIAPINE FUMARATE 25 MG: 25 TABLET ORAL at 00:25

## 2023-01-01 RX ADMIN — SENNOSIDES AND DOCUSATE SODIUM 2 TABLET: 8.6; 5 TABLET ORAL at 20:28

## 2023-01-01 RX ADMIN — DORZOLAMIDE HYDROCHLORIDE AND TIMOLOL MALEATE 1 DROP: 22.3; 6.8 SOLUTION/ DROPS OPHTHALMIC at 20:00

## 2023-01-01 RX ADMIN — POLYETHYLENE GLYCOL 3350 17 G: 17 POWDER, FOR SOLUTION ORAL at 10:01

## 2023-01-01 RX ADMIN — Medication 12.5 MG: at 07:46

## 2023-01-01 RX ADMIN — Medication 12.5 MG: at 08:11

## 2023-01-01 RX ADMIN — DORZOLAMIDE HYDROCHLORIDE AND TIMOLOL MALEATE 1 DROP: 22.3; 6.8 SOLUTION/ DROPS OPHTHALMIC at 08:09

## 2023-01-01 RX ADMIN — DORZOLAMIDE HYDROCHLORIDE AND TIMOLOL MALEATE 1 DROP: 22.3; 6.8 SOLUTION/ DROPS OPHTHALMIC at 08:56

## 2023-01-01 RX ADMIN — ACETAMINOPHEN 975 MG: 325 TABLET ORAL at 00:09

## 2023-01-01 RX ADMIN — CARBIDOPA AND LEVODOPA 1 HALF-TAB: 10; 100 TABLET ORAL at 07:58

## 2023-01-01 RX ADMIN — LATANOPROST 1 DROP: 50 SOLUTION OPHTHALMIC at 21:04

## 2023-01-01 RX ADMIN — ACETAMINOPHEN 975 MG: 325 TABLET, FILM COATED ORAL at 14:16

## 2023-01-01 RX ADMIN — RAMELTEON 8 MG: 8 TABLET ORAL at 22:02

## 2023-01-01 RX ADMIN — LATANOPROST 1 DROP: 50 SOLUTION OPHTHALMIC at 20:14

## 2023-01-01 RX ADMIN — LATANOPROST 1 DROP: 50 SOLUTION OPHTHALMIC at 20:51

## 2023-01-01 RX ADMIN — PREGABALIN 50 MG: 25 CAPSULE ORAL at 08:49

## 2023-01-01 RX ADMIN — DORZOLAMIDE HYDROCHLORIDE AND TIMOLOL MALEATE 1 DROP: 22.3; 6.8 SOLUTION/ DROPS OPHTHALMIC at 08:51

## 2023-01-01 RX ADMIN — RAMELTEON 4 MG: 8 TABLET, FILM COATED ORAL at 20:20

## 2023-01-01 RX ADMIN — SENNOSIDES AND DOCUSATE SODIUM 2 TABLET: 8.6; 5 TABLET ORAL at 08:11

## 2023-01-01 RX ADMIN — TRAZODONE HYDROCHLORIDE 100 MG: 50 TABLET ORAL at 20:58

## 2023-01-01 RX ADMIN — HYDROMORPHONE HYDROCHLORIDE 1 MG: 2 TABLET ORAL at 22:25

## 2023-01-01 RX ADMIN — Medication 12.5 MG: at 14:29

## 2023-01-01 RX ADMIN — POLYETHYLENE GLYCOL 3350 17 G: 17 POWDER, FOR SOLUTION ORAL at 09:57

## 2023-01-01 RX ADMIN — DORZOLAMIDE HYDROCHLORIDE AND TIMOLOL MALEATE 1 DROP: 22.3; 6.8 SOLUTION/ DROPS OPHTHALMIC at 12:28

## 2023-01-01 RX ADMIN — AMLODIPINE BESYLATE 2.5 MG: 2.5 TABLET ORAL at 09:58

## 2023-01-01 RX ADMIN — Medication 12.5 MG: at 08:02

## 2023-01-01 RX ADMIN — DORZOLAMIDE HYDROCHLORIDE AND TIMOLOL MALEATE 1 DROP: 22.3; 6.8 SOLUTION/ DROPS OPHTHALMIC at 08:02

## 2023-01-01 RX ADMIN — SODIUM CHLORIDE, PRESERVATIVE FREE: 5 INJECTION INTRAVENOUS at 20:30

## 2023-01-01 RX ADMIN — QUETIAPINE FUMARATE 50 MG: 25 TABLET ORAL at 20:00

## 2023-01-01 RX ADMIN — AMLODIPINE BESYLATE 5 MG: 5 TABLET ORAL at 08:00

## 2023-01-01 RX ADMIN — SENNOSIDES AND DOCUSATE SODIUM 1 TABLET: 8.6; 5 TABLET ORAL at 20:33

## 2023-01-01 RX ADMIN — QUETIAPINE FUMARATE 25 MG: 25 TABLET ORAL at 14:16

## 2023-01-01 RX ADMIN — CARBIDOPA AND LEVODOPA 1 HALF-TAB: 10; 100 TABLET ORAL at 09:49

## 2023-01-01 RX ADMIN — OLANZAPINE 2.5 MG: 5 TABLET, ORALLY DISINTEGRATING ORAL at 22:55

## 2023-01-01 RX ADMIN — LATANOPROST 1 DROP: 50 SOLUTION OPHTHALMIC at 20:33

## 2023-01-01 RX ADMIN — SENNOSIDES AND DOCUSATE SODIUM 1 TABLET: 8.6; 5 TABLET ORAL at 20:57

## 2023-01-01 RX ADMIN — ACETAMINOPHEN 650 MG: 325 TABLET ORAL at 03:26

## 2023-01-01 RX ADMIN — QUETIAPINE FUMARATE 25 MG: 25 TABLET ORAL at 01:41

## 2023-01-01 RX ADMIN — SENNOSIDES AND DOCUSATE SODIUM 1 TABLET: 8.6; 5 TABLET ORAL at 20:00

## 2023-01-01 RX ADMIN — OLANZAPINE 2.5 MG: 5 TABLET, ORALLY DISINTEGRATING ORAL at 03:27

## 2023-01-01 RX ADMIN — Medication 12.5 MG: at 15:52

## 2023-01-01 RX ADMIN — POLYETHYLENE GLYCOL 3350 17 G: 17 POWDER, FOR SOLUTION ORAL at 08:00

## 2023-01-01 RX ADMIN — QUETIAPINE FUMARATE 50 MG: 25 TABLET ORAL at 22:17

## 2023-01-01 RX ADMIN — PREGABALIN 50 MG: 25 CAPSULE ORAL at 08:11

## 2023-01-01 RX ADMIN — DORZOLAMIDE HYDROCHLORIDE AND TIMOLOL MALEATE 1 DROP: 22.3; 6.8 SOLUTION/ DROPS OPHTHALMIC at 21:57

## 2023-01-01 RX ADMIN — AMLODIPINE BESYLATE 2.5 MG: 2.5 TABLET ORAL at 10:05

## 2023-01-01 RX ADMIN — PREGABALIN 50 MG: 25 CAPSULE ORAL at 22:02

## 2023-01-01 RX ADMIN — SENNOSIDES AND DOCUSATE SODIUM 1 TABLET: 8.6; 5 TABLET ORAL at 20:05

## 2023-01-01 RX ADMIN — RAMELTEON 8 MG: 8 TABLET ORAL at 20:00

## 2023-01-01 RX ADMIN — SENNOSIDES AND DOCUSATE SODIUM 1 TABLET: 8.6; 5 TABLET ORAL at 20:43

## 2023-01-01 RX ADMIN — QUETIAPINE FUMARATE 50 MG: 25 TABLET ORAL at 20:04

## 2023-01-01 RX ADMIN — DORZOLAMIDE HYDROCHLORIDE AND TIMOLOL MALEATE 1 DROP: 22.3; 6.8 SOLUTION/ DROPS OPHTHALMIC at 21:16

## 2023-01-01 RX ADMIN — Medication 12.5 MG: at 08:06

## 2023-01-01 RX ADMIN — QUETIAPINE FUMARATE 25 MG: 25 TABLET ORAL at 17:21

## 2023-01-01 RX ADMIN — Medication 12.5 MG: at 14:16

## 2023-01-01 RX ADMIN — TRAZODONE HYDROCHLORIDE 100 MG: 50 TABLET ORAL at 20:52

## 2023-01-01 RX ADMIN — PREGABALIN 50 MG: 25 CAPSULE ORAL at 08:06

## 2023-01-01 RX ADMIN — PREGABALIN 50 MG: 25 CAPSULE ORAL at 07:52

## 2023-01-01 RX ADMIN — OLANZAPINE 2.5 MG: 10 INJECTION, POWDER, LYOPHILIZED, FOR SOLUTION INTRAMUSCULAR at 11:58

## 2023-01-01 RX ADMIN — TRAZODONE HYDROCHLORIDE 100 MG: 50 TABLET ORAL at 20:54

## 2023-01-01 RX ADMIN — DORZOLAMIDE HYDROCHLORIDE AND TIMOLOL MALEATE 1 DROP: 22.3; 6.8 SOLUTION/ DROPS OPHTHALMIC at 20:30

## 2023-01-01 RX ADMIN — CARBIDOPA AND LEVODOPA 1 HALF-TAB: 10; 100 TABLET ORAL at 20:39

## 2023-01-01 RX ADMIN — DORZOLAMIDE HYDROCHLORIDE AND TIMOLOL MALEATE 1 DROP: 22.3; 6.8 SOLUTION/ DROPS OPHTHALMIC at 20:46

## 2023-01-01 RX ADMIN — CARBIDOPA AND LEVODOPA 1 HALF-TAB: 10; 100 TABLET ORAL at 14:39

## 2023-01-01 RX ADMIN — OLANZAPINE 2.5 MG: 10 INJECTION, POWDER, LYOPHILIZED, FOR SOLUTION INTRAMUSCULAR at 20:37

## 2023-01-01 RX ADMIN — DORZOLAMIDE HYDROCHLORIDE AND TIMOLOL MALEATE 1 DROP: 22.3; 6.8 SOLUTION/ DROPS OPHTHALMIC at 20:33

## 2023-01-01 RX ADMIN — Medication 1 TABLET: at 08:00

## 2023-01-01 RX ADMIN — QUETIAPINE FUMARATE 50 MG: 25 TABLET ORAL at 22:01

## 2023-01-01 RX ADMIN — SENNOSIDES AND DOCUSATE SODIUM 1 TABLET: 8.6; 5 TABLET ORAL at 20:38

## 2023-01-01 RX ADMIN — CARBIDOPA AND LEVODOPA 1 HALF-TAB: 10; 100 TABLET ORAL at 08:12

## 2023-01-01 RX ADMIN — ENOXAPARIN SODIUM 30 MG: 30 INJECTION SUBCUTANEOUS at 17:12

## 2023-01-01 RX ADMIN — LATANOPROST 1 DROP: 50 SOLUTION OPHTHALMIC at 20:15

## 2023-01-01 RX ADMIN — TRAZODONE HYDROCHLORIDE 100 MG: 50 TABLET ORAL at 21:38

## 2023-01-01 RX ADMIN — HYDROMORPHONE HYDROCHLORIDE 1 MG: 2 TABLET ORAL at 17:21

## 2023-01-01 RX ADMIN — QUETIAPINE FUMARATE 50 MG: 25 TABLET ORAL at 20:33

## 2023-01-01 RX ADMIN — PREGABALIN 50 MG: 25 CAPSULE ORAL at 21:36

## 2023-01-01 RX ADMIN — DORZOLAMIDE HYDROCHLORIDE AND TIMOLOL MALEATE 1 DROP: 22.3; 6.8 SOLUTION/ DROPS OPHTHALMIC at 20:44

## 2023-01-01 RX ADMIN — RAMELTEON 8 MG: 8 TABLET ORAL at 20:52

## 2023-01-01 RX ADMIN — AMLODIPINE BESYLATE 2.5 MG: 2.5 TABLET ORAL at 09:40

## 2023-01-01 RX ADMIN — Medication 12.5 MG: at 14:04

## 2023-01-01 RX ADMIN — RAMELTEON 8 MG: 8 TABLET ORAL at 22:00

## 2023-01-01 RX ADMIN — LATANOPROST 1 DROP: 50 SOLUTION OPHTHALMIC at 20:36

## 2023-01-01 RX ADMIN — CARBIDOPA AND LEVODOPA 1 HALF-TAB: 10; 100 TABLET ORAL at 20:43

## 2023-01-01 RX ADMIN — Medication 12.5 MG: at 09:26

## 2023-01-01 RX ADMIN — CARBIDOPA AND LEVODOPA 1 HALF-TAB: 10; 100 TABLET ORAL at 20:13

## 2023-01-01 RX ADMIN — PREGABALIN 50 MG: 25 CAPSULE ORAL at 10:56

## 2023-01-01 RX ADMIN — QUETIAPINE FUMARATE 25 MG: 25 TABLET ORAL at 14:03

## 2023-01-01 RX ADMIN — ACETAMINOPHEN 975 MG: 325 TABLET, FILM COATED ORAL at 20:26

## 2023-01-01 RX ADMIN — ACETAMINOPHEN 975 MG: 325 TABLET, FILM COATED ORAL at 20:56

## 2023-01-01 RX ADMIN — AMLODIPINE BESYLATE 2.5 MG: 2.5 TABLET ORAL at 08:35

## 2023-01-01 RX ADMIN — HYDRALAZINE HYDROCHLORIDE 10 MG: 20 INJECTION, SOLUTION INTRAMUSCULAR; INTRAVENOUS at 20:47

## 2023-01-01 RX ADMIN — OLANZAPINE 2.5 MG: 10 INJECTION, POWDER, LYOPHILIZED, FOR SOLUTION INTRAMUSCULAR at 18:55

## 2023-01-01 RX ADMIN — SENNOSIDES AND DOCUSATE SODIUM 1 TABLET: 8.6; 5 TABLET ORAL at 09:43

## 2023-01-01 RX ADMIN — TRAZODONE HYDROCHLORIDE 100 MG: 50 TABLET ORAL at 21:50

## 2023-01-01 RX ADMIN — OLANZAPINE 2.5 MG: 10 INJECTION, POWDER, LYOPHILIZED, FOR SOLUTION INTRAMUSCULAR at 21:38

## 2023-01-01 RX ADMIN — CARBIDOPA AND LEVODOPA 1 HALF-TAB: 10; 100 TABLET ORAL at 20:18

## 2023-01-01 RX ADMIN — SENNOSIDES AND DOCUSATE SODIUM 2 TABLET: 8.6; 5 TABLET ORAL at 21:43

## 2023-01-01 RX ADMIN — Medication 12.5 MG: at 08:03

## 2023-01-01 RX ADMIN — AMLODIPINE BESYLATE 2.5 MG: 2.5 TABLET ORAL at 21:54

## 2023-01-01 RX ADMIN — QUETIAPINE FUMARATE 50 MG: 25 TABLET ORAL at 21:01

## 2023-01-01 RX ADMIN — CARBIDOPA AND LEVODOPA 1 HALF-TAB: 10; 100 TABLET ORAL at 14:17

## 2023-01-01 RX ADMIN — POLYETHYLENE GLYCOL 3350 17 G: 17 POWDER, FOR SOLUTION ORAL at 20:49

## 2023-01-01 RX ADMIN — AMLODIPINE BESYLATE 2.5 MG: 2.5 TABLET ORAL at 11:11

## 2023-01-01 RX ADMIN — PREGABALIN 50 MG: 25 CAPSULE ORAL at 21:04

## 2023-01-01 RX ADMIN — Medication 1 TABLET: at 09:02

## 2023-01-01 RX ADMIN — Medication 12.5 MG: at 16:52

## 2023-01-01 RX ADMIN — CARBIDOPA AND LEVODOPA 1 HALF-TAB: 10; 100 TABLET ORAL at 08:36

## 2023-01-01 RX ADMIN — LIDOCAINE 1 PATCH: 246 PATCH TOPICAL at 20:51

## 2023-01-01 RX ADMIN — TRAZODONE HYDROCHLORIDE 100 MG: 50 TABLET ORAL at 20:02

## 2023-01-01 RX ADMIN — SENNOSIDES AND DOCUSATE SODIUM 1 TABLET: 8.6; 5 TABLET ORAL at 20:20

## 2023-01-01 RX ADMIN — ENOXAPARIN SODIUM 30 MG: 30 INJECTION SUBCUTANEOUS at 17:36

## 2023-01-01 RX ADMIN — LATANOPROST 1 DROP: 50 SOLUTION OPHTHALMIC at 20:19

## 2023-01-01 RX ADMIN — RAMELTEON 8 MG: 8 TABLET ORAL at 20:43

## 2023-01-01 RX ADMIN — LATANOPROST 1 DROP: 50 SOLUTION OPHTHALMIC at 21:52

## 2023-01-01 RX ADMIN — ACETAMINOPHEN 975 MG: 325 TABLET, FILM COATED ORAL at 01:15

## 2023-01-01 RX ADMIN — CARBIDOPA AND LEVODOPA 1 HALF-TAB: 10; 100 TABLET ORAL at 15:20

## 2023-01-01 RX ADMIN — PREGABALIN 50 MG: 25 CAPSULE ORAL at 11:10

## 2023-01-01 RX ADMIN — Medication 1 TABLET: at 08:06

## 2023-01-01 RX ADMIN — SENNOSIDES AND DOCUSATE SODIUM 1 TABLET: 8.6; 5 TABLET ORAL at 22:24

## 2023-01-01 RX ADMIN — ACETAMINOPHEN 975 MG: 325 TABLET, FILM COATED ORAL at 10:28

## 2023-01-01 RX ADMIN — ACETAMINOPHEN 975 MG: 325 TABLET, FILM COATED ORAL at 12:21

## 2023-01-01 RX ADMIN — DORZOLAMIDE HYDROCHLORIDE AND TIMOLOL MALEATE 1 DROP: 22.3; 6.8 SOLUTION/ DROPS OPHTHALMIC at 09:50

## 2023-01-01 RX ADMIN — ENOXAPARIN SODIUM 30 MG: 30 INJECTION SUBCUTANEOUS at 17:49

## 2023-01-01 RX ADMIN — ACETAMINOPHEN 975 MG: 325 TABLET, FILM COATED ORAL at 00:51

## 2023-01-01 RX ADMIN — QUETIAPINE FUMARATE 50 MG: 25 TABLET ORAL at 20:54

## 2023-01-01 RX ADMIN — Medication 1 TABLET: at 09:42

## 2023-01-01 RX ADMIN — PREGABALIN 50 MG: 25 CAPSULE ORAL at 20:31

## 2023-01-01 RX ADMIN — PREGABALIN 50 MG: 25 CAPSULE ORAL at 20:01

## 2023-01-01 RX ADMIN — Medication 12.5 MG: at 14:02

## 2023-01-01 RX ADMIN — Medication 12.5 MG: at 10:00

## 2023-01-01 RX ADMIN — DORZOLAMIDE HYDROCHLORIDE AND TIMOLOL MALEATE 1 DROP: 22.3; 6.8 SOLUTION/ DROPS OPHTHALMIC at 09:35

## 2023-01-01 RX ADMIN — SENNOSIDES AND DOCUSATE SODIUM 1 TABLET: 8.6; 5 TABLET ORAL at 08:23

## 2023-01-01 RX ADMIN — LATANOPROST 1 DROP: 50 SOLUTION OPHTHALMIC at 20:59

## 2023-01-01 RX ADMIN — OLANZAPINE 2.5 MG: 5 TABLET, ORALLY DISINTEGRATING ORAL at 09:21

## 2023-01-01 RX ADMIN — PREGABALIN 50 MG: 25 CAPSULE ORAL at 09:19

## 2023-01-01 RX ADMIN — LATANOPROST 1 DROP: 50 SOLUTION OPHTHALMIC at 21:48

## 2023-01-01 RX ADMIN — POLYETHYLENE GLYCOL 3350 17 G: 17 POWDER, FOR SOLUTION ORAL at 09:02

## 2023-01-01 RX ADMIN — PREGABALIN 50 MG: 25 CAPSULE ORAL at 08:33

## 2023-01-01 RX ADMIN — Medication 12.5 MG: at 16:32

## 2023-01-01 RX ADMIN — QUETIAPINE FUMARATE 25 MG: 25 TABLET ORAL at 13:39

## 2023-01-01 RX ADMIN — Medication 12.5 MG: at 08:27

## 2023-01-01 RX ADMIN — DORZOLAMIDE HYDROCHLORIDE AND TIMOLOL MALEATE 1 DROP: 22.3; 6.8 SOLUTION/ DROPS OPHTHALMIC at 10:04

## 2023-01-01 RX ADMIN — CARBIDOPA AND LEVODOPA 1 HALF-TAB: 10; 100 TABLET ORAL at 09:35

## 2023-01-01 RX ADMIN — ACETAMINOPHEN 975 MG: 325 TABLET, FILM COATED ORAL at 20:02

## 2023-01-01 RX ADMIN — Medication 12.5 MG: at 14:01

## 2023-01-01 RX ADMIN — DORZOLAMIDE HYDROCHLORIDE AND TIMOLOL MALEATE 1 DROP: 22.3; 6.8 SOLUTION/ DROPS OPHTHALMIC at 09:22

## 2023-01-01 RX ADMIN — ACETAMINOPHEN 975 MG: 325 TABLET, FILM COATED ORAL at 20:32

## 2023-01-01 RX ADMIN — CARBIDOPA AND LEVODOPA 1 HALF-TAB: 10; 100 TABLET ORAL at 08:02

## 2023-01-01 RX ADMIN — DORZOLAMIDE HYDROCHLORIDE AND TIMOLOL MALEATE 1 DROP: 22.3; 6.8 SOLUTION/ DROPS OPHTHALMIC at 21:09

## 2023-01-01 RX ADMIN — ACETAMINOPHEN 975 MG: 325 TABLET, FILM COATED ORAL at 13:21

## 2023-01-01 RX ADMIN — QUETIAPINE FUMARATE 25 MG: 25 TABLET ORAL at 01:02

## 2023-01-01 RX ADMIN — SENNOSIDES AND DOCUSATE SODIUM 1 TABLET: 8.6; 5 TABLET ORAL at 20:56

## 2023-01-01 RX ADMIN — DORZOLAMIDE HYDROCHLORIDE AND TIMOLOL MALEATE 1 DROP: 22.3; 6.8 SOLUTION/ DROPS OPHTHALMIC at 20:37

## 2023-01-01 RX ADMIN — TRAZODONE HYDROCHLORIDE 100 MG: 50 TABLET ORAL at 20:07

## 2023-01-01 RX ADMIN — TRAZODONE HYDROCHLORIDE 100 MG: 50 TABLET ORAL at 20:17

## 2023-01-01 RX ADMIN — OLANZAPINE 2.5 MG: 5 TABLET, ORALLY DISINTEGRATING ORAL at 12:47

## 2023-01-01 RX ADMIN — AMLODIPINE BESYLATE 2.5 MG: 2.5 TABLET ORAL at 08:23

## 2023-01-01 RX ADMIN — ENOXAPARIN SODIUM 40 MG: 40 INJECTION SUBCUTANEOUS at 17:45

## 2023-01-01 RX ADMIN — ENOXAPARIN SODIUM 40 MG: 40 INJECTION SUBCUTANEOUS at 16:22

## 2023-01-01 RX ADMIN — SENNOSIDES AND DOCUSATE SODIUM 1 TABLET: 8.6; 5 TABLET ORAL at 09:40

## 2023-01-01 RX ADMIN — Medication 12.5 MG: at 14:33

## 2023-01-01 RX ADMIN — CARBIDOPA AND LEVODOPA 1 HALF-TAB: 10; 100 TABLET ORAL at 21:49

## 2023-01-01 RX ADMIN — ACETAMINOPHEN 975 MG: 325 TABLET, FILM COATED ORAL at 14:02

## 2023-01-01 RX ADMIN — PREGABALIN 50 MG: 25 CAPSULE ORAL at 22:01

## 2023-01-01 RX ADMIN — DORZOLAMIDE HYDROCHLORIDE AND TIMOLOL MALEATE 1 DROP: 22.3; 6.8 SOLUTION/ DROPS OPHTHALMIC at 08:01

## 2023-01-01 RX ADMIN — PREGABALIN 50 MG: 25 CAPSULE ORAL at 09:46

## 2023-01-01 RX ADMIN — RAMELTEON 8 MG: 8 TABLET ORAL at 20:18

## 2023-01-01 RX ADMIN — Medication 12.5 MG: at 09:16

## 2023-01-01 RX ADMIN — PREGABALIN 50 MG: 25 CAPSULE ORAL at 21:40

## 2023-01-01 RX ADMIN — PREGABALIN 50 MG: 25 CAPSULE ORAL at 09:10

## 2023-01-01 RX ADMIN — ACETAMINOPHEN 975 MG: 325 TABLET, FILM COATED ORAL at 14:34

## 2023-01-01 RX ADMIN — Medication 1 TABLET: at 09:26

## 2023-01-01 RX ADMIN — CARBIDOPA AND LEVODOPA 1 HALF-TAB: 10; 100 TABLET ORAL at 09:09

## 2023-01-01 RX ADMIN — CARBIDOPA AND LEVODOPA 1 HALF-TAB: 10; 100 TABLET ORAL at 21:47

## 2023-01-01 RX ADMIN — LATANOPROST 1 DROP: 50 SOLUTION OPHTHALMIC at 20:28

## 2023-01-01 RX ADMIN — SODIUM CHLORIDE 500 ML: 9 INJECTION, SOLUTION INTRAVENOUS at 18:16

## 2023-01-01 RX ADMIN — ACETAMINOPHEN 975 MG: 325 TABLET, FILM COATED ORAL at 07:56

## 2023-01-01 RX ADMIN — LIDOCAINE 1 PATCH: 246 PATCH TOPICAL at 20:33

## 2023-01-01 RX ADMIN — DORZOLAMIDE HYDROCHLORIDE AND TIMOLOL MALEATE 1 DROP: 22.3; 6.8 SOLUTION/ DROPS OPHTHALMIC at 09:02

## 2023-01-01 RX ADMIN — AMLODIPINE BESYLATE 2.5 MG: 2.5 TABLET ORAL at 20:46

## 2023-01-01 RX ADMIN — QUETIAPINE FUMARATE 50 MG: 25 TABLET ORAL at 21:12

## 2023-01-01 RX ADMIN — DORZOLAMIDE HYDROCHLORIDE AND TIMOLOL MALEATE 1 DROP: 22.3; 6.8 SOLUTION/ DROPS OPHTHALMIC at 08:42

## 2023-01-01 RX ADMIN — TRAZODONE HYDROCHLORIDE 100 MG: 50 TABLET ORAL at 21:37

## 2023-01-01 RX ADMIN — POLYETHYLENE GLYCOL 3350 17 G: 17 POWDER, FOR SOLUTION ORAL at 21:48

## 2023-01-01 RX ADMIN — QUETIAPINE FUMARATE 50 MG: 25 TABLET ORAL at 21:49

## 2023-01-01 RX ADMIN — QUETIAPINE FUMARATE 50 MG: 25 TABLET ORAL at 21:36

## 2023-01-01 RX ADMIN — QUETIAPINE FUMARATE 25 MG: 25 TABLET ORAL at 10:11

## 2023-01-01 RX ADMIN — HYDROMORPHONE HYDROCHLORIDE 1 MG: 2 TABLET ORAL at 17:59

## 2023-01-01 RX ADMIN — ACETAMINOPHEN 975 MG: 325 TABLET, FILM COATED ORAL at 10:55

## 2023-01-01 RX ADMIN — OLANZAPINE 2.5 MG: 5 TABLET, ORALLY DISINTEGRATING ORAL at 22:57

## 2023-01-01 RX ADMIN — ACETAMINOPHEN 975 MG: 325 TABLET ORAL at 00:27

## 2023-01-01 RX ADMIN — QUETIAPINE FUMARATE 25 MG: 25 TABLET ORAL at 15:47

## 2023-01-01 RX ADMIN — QUETIAPINE FUMARATE 25 MG: 25 TABLET ORAL at 14:47

## 2023-01-01 RX ADMIN — Medication 1 MG: at 01:06

## 2023-01-01 RX ADMIN — TRAZODONE HYDROCHLORIDE 100 MG: 50 TABLET ORAL at 22:57

## 2023-01-01 RX ADMIN — AMLODIPINE BESYLATE 2.5 MG: 2.5 TABLET ORAL at 20:32

## 2023-01-01 RX ADMIN — TRAZODONE HYDROCHLORIDE 100 MG: 50 TABLET ORAL at 20:00

## 2023-01-01 RX ADMIN — SENNOSIDES AND DOCUSATE SODIUM 1 TABLET: 8.6; 5 TABLET ORAL at 09:09

## 2023-01-01 RX ADMIN — OLANZAPINE 2.5 MG: 5 TABLET, ORALLY DISINTEGRATING ORAL at 19:12

## 2023-01-01 RX ADMIN — ACETAMINOPHEN 975 MG: 325 TABLET, FILM COATED ORAL at 01:33

## 2023-01-01 RX ADMIN — POLYETHYLENE GLYCOL 3350 17 G: 17 POWDER, FOR SOLUTION ORAL at 09:33

## 2023-01-01 RX ADMIN — AMLODIPINE BESYLATE 2.5 MG: 2.5 TABLET ORAL at 09:33

## 2023-01-01 RX ADMIN — CARBIDOPA AND LEVODOPA 1 HALF-TAB: 10; 100 TABLET ORAL at 08:30

## 2023-01-01 RX ADMIN — POLYETHYLENE GLYCOL 3350 17 G: 17 POWDER, FOR SOLUTION ORAL at 20:46

## 2023-01-01 RX ADMIN — DORZOLAMIDE HYDROCHLORIDE AND TIMOLOL MALEATE 1 DROP: 22.3; 6.8 SOLUTION/ DROPS OPHTHALMIC at 20:47

## 2023-01-01 RX ADMIN — CARBIDOPA AND LEVODOPA 1 HALF-TAB: 10; 100 TABLET ORAL at 14:02

## 2023-01-01 RX ADMIN — QUETIAPINE FUMARATE 25 MG: 25 TABLET ORAL at 10:59

## 2023-01-01 RX ADMIN — PREGABALIN 50 MG: 25 CAPSULE ORAL at 08:34

## 2023-01-01 RX ADMIN — ACETAMINOPHEN 975 MG: 325 TABLET, FILM COATED ORAL at 20:37

## 2023-01-01 RX ADMIN — Medication 12.5 MG: at 10:03

## 2023-01-01 RX ADMIN — CARBIDOPA AND LEVODOPA 1 HALF-TAB: 10; 100 TABLET ORAL at 15:29

## 2023-01-01 RX ADMIN — LORAZEPAM 1 MG: 2 INJECTION INTRAMUSCULAR; INTRAVENOUS at 08:52

## 2023-01-01 RX ADMIN — CARBIDOPA AND LEVODOPA 1 HALF-TAB: 10; 100 TABLET ORAL at 20:27

## 2023-01-01 RX ADMIN — LATANOPROST 1 DROP: 50 SOLUTION OPHTHALMIC at 21:10

## 2023-01-01 RX ADMIN — CARBIDOPA AND LEVODOPA 1 HALF-TAB: 10; 100 TABLET ORAL at 20:28

## 2023-01-01 RX ADMIN — QUETIAPINE FUMARATE 12.5 MG: 25 TABLET ORAL at 00:09

## 2023-01-01 RX ADMIN — POLYETHYLENE GLYCOL 3350 17 G: 17 POWDER, FOR SOLUTION ORAL at 20:51

## 2023-01-01 RX ADMIN — DORZOLAMIDE HYDROCHLORIDE AND TIMOLOL MALEATE 1 DROP: 22.3; 6.8 SOLUTION/ DROPS OPHTHALMIC at 08:06

## 2023-01-01 RX ADMIN — Medication 12.5 MG: at 10:24

## 2023-01-01 RX ADMIN — OLANZAPINE 2.5 MG: 10 INJECTION, POWDER, LYOPHILIZED, FOR SOLUTION INTRAMUSCULAR at 23:12

## 2023-01-01 RX ADMIN — RAMELTEON 8 MG: 8 TABLET ORAL at 20:27

## 2023-01-01 RX ADMIN — PREGABALIN 50 MG: 25 CAPSULE ORAL at 08:57

## 2023-01-01 RX ADMIN — PREGABALIN 50 MG: 25 CAPSULE ORAL at 21:49

## 2023-01-01 RX ADMIN — AMLODIPINE BESYLATE 2.5 MG: 2.5 TABLET ORAL at 08:59

## 2023-01-01 RX ADMIN — SENNOSIDES AND DOCUSATE SODIUM 1 TABLET: 8.6; 5 TABLET ORAL at 08:57

## 2023-01-01 RX ADMIN — TRAZODONE HYDROCHLORIDE 100 MG: 50 TABLET ORAL at 20:29

## 2023-01-01 RX ADMIN — ENOXAPARIN SODIUM 40 MG: 40 INJECTION SUBCUTANEOUS at 21:46

## 2023-01-01 RX ADMIN — HYDROMORPHONE HYDROCHLORIDE 1 MG: 2 TABLET ORAL at 12:48

## 2023-01-01 RX ADMIN — Medication 12.5 MG: at 07:59

## 2023-01-01 RX ADMIN — LIDOCAINE 1 PATCH: 246 PATCH TOPICAL at 21:19

## 2023-01-01 RX ADMIN — POLYETHYLENE GLYCOL 3350 17 G: 17 POWDER, FOR SOLUTION ORAL at 22:02

## 2023-01-01 RX ADMIN — PREGABALIN 50 MG: 25 CAPSULE ORAL at 20:32

## 2023-01-01 RX ADMIN — DORZOLAMIDE HYDROCHLORIDE AND TIMOLOL MALEATE 1 DROP: 22.3; 6.8 SOLUTION/ DROPS OPHTHALMIC at 21:52

## 2023-01-01 RX ADMIN — DORZOLAMIDE HYDROCHLORIDE AND TIMOLOL MALEATE 1 DROP: 22.3; 6.8 SOLUTION/ DROPS OPHTHALMIC at 20:22

## 2023-01-01 RX ADMIN — RAMELTEON 4 MG: 8 TABLET, FILM COATED ORAL at 19:52

## 2023-01-01 RX ADMIN — RAMELTEON 8 MG: 8 TABLET ORAL at 20:58

## 2023-01-01 RX ADMIN — DORZOLAMIDE HYDROCHLORIDE AND TIMOLOL MALEATE 1 DROP: 22.3; 6.8 SOLUTION/ DROPS OPHTHALMIC at 10:25

## 2023-01-01 RX ADMIN — ACETAMINOPHEN 975 MG: 325 TABLET, FILM COATED ORAL at 20:13

## 2023-01-01 RX ADMIN — PREGABALIN 50 MG: 25 CAPSULE ORAL at 20:54

## 2023-01-01 RX ADMIN — SENNOSIDES AND DOCUSATE SODIUM 1 TABLET: 8.6; 5 TABLET ORAL at 20:46

## 2023-01-01 RX ADMIN — POLYETHYLENE GLYCOL 3350 17 G: 17 POWDER, FOR SOLUTION ORAL at 08:34

## 2023-01-01 RX ADMIN — LIDOCAINE 1 PATCH: 246 PATCH TOPICAL at 22:00

## 2023-01-01 RX ADMIN — SENNOSIDES AND DOCUSATE SODIUM 1 TABLET: 8.6; 5 TABLET ORAL at 21:36

## 2023-01-01 RX ADMIN — RAMELTEON 4 MG: 8 TABLET, FILM COATED ORAL at 21:35

## 2023-01-01 RX ADMIN — Medication 1 TABLET: at 09:10

## 2023-01-01 RX ADMIN — SENNOSIDES AND DOCUSATE SODIUM 1 TABLET: 8.6; 5 TABLET ORAL at 20:36

## 2023-01-01 RX ADMIN — CARBIDOPA AND LEVODOPA 1 HALF-TAB: 10; 100 TABLET ORAL at 08:00

## 2023-01-01 RX ADMIN — AMLODIPINE BESYLATE 2.5 MG: 2.5 TABLET ORAL at 10:56

## 2023-01-01 RX ADMIN — PREGABALIN 50 MG: 25 CAPSULE ORAL at 09:33

## 2023-01-01 RX ADMIN — TRAZODONE HYDROCHLORIDE 100 MG: 50 TABLET ORAL at 22:01

## 2023-01-01 RX ADMIN — ACETAMINOPHEN 975 MG: 325 TABLET, FILM COATED ORAL at 04:25

## 2023-01-01 RX ADMIN — TRAZODONE HYDROCHLORIDE 100 MG: 50 TABLET ORAL at 20:04

## 2023-01-01 RX ADMIN — RAMELTEON 4 MG: 8 TABLET, FILM COATED ORAL at 20:07

## 2023-01-01 RX ADMIN — Medication 12.5 MG: at 08:00

## 2023-01-01 RX ADMIN — SENNOSIDES AND DOCUSATE SODIUM 1 TABLET: 8.6; 5 TABLET ORAL at 21:18

## 2023-01-01 RX ADMIN — HYDROMORPHONE HYDROCHLORIDE 0.2 MG: 0.2 INJECTION, SOLUTION INTRAMUSCULAR; INTRAVENOUS; SUBCUTANEOUS at 16:11

## 2023-01-01 RX ADMIN — ENOXAPARIN SODIUM 40 MG: 40 INJECTION SUBCUTANEOUS at 18:45

## 2023-01-01 RX ADMIN — LATANOPROST 1 DROP: 50 SOLUTION OPHTHALMIC at 21:25

## 2023-01-01 RX ADMIN — HYDROMORPHONE HYDROCHLORIDE 1 MG: 2 TABLET ORAL at 11:57

## 2023-01-01 RX ADMIN — OLANZAPINE 2.5 MG: 5 TABLET, ORALLY DISINTEGRATING ORAL at 09:41

## 2023-01-01 RX ADMIN — TRAZODONE HYDROCHLORIDE 100 MG: 50 TABLET ORAL at 20:43

## 2023-01-01 RX ADMIN — POLYETHYLENE GLYCOL 3350 17 G: 17 POWDER, FOR SOLUTION ORAL at 08:56

## 2023-01-01 RX ADMIN — ACETAMINOPHEN 975 MG: 325 TABLET, FILM COATED ORAL at 08:28

## 2023-01-01 RX ADMIN — QUETIAPINE FUMARATE 12.5 MG: 25 TABLET ORAL at 08:00

## 2023-01-01 RX ADMIN — Medication 1 TABLET: at 10:46

## 2023-01-01 RX ADMIN — CARBIDOPA AND LEVODOPA 1 HALF-TAB: 10; 100 TABLET ORAL at 21:03

## 2023-01-01 RX ADMIN — Medication 1 TABLET: at 08:57

## 2023-01-01 RX ADMIN — LIDOCAINE 1 PATCH: 246 PATCH TOPICAL at 21:43

## 2023-01-01 RX ADMIN — DORZOLAMIDE HYDROCHLORIDE AND TIMOLOL MALEATE 1 DROP: 22.3; 6.8 SOLUTION/ DROPS OPHTHALMIC at 09:09

## 2023-01-01 RX ADMIN — SENNOSIDES AND DOCUSATE SODIUM 1 TABLET: 8.6; 5 TABLET ORAL at 21:51

## 2023-01-01 RX ADMIN — DORZOLAMIDE HYDROCHLORIDE AND TIMOLOL MALEATE 1 DROP: 22.3; 6.8 SOLUTION/ DROPS OPHTHALMIC at 21:38

## 2023-01-01 RX ADMIN — Medication 12.5 MG: at 15:31

## 2023-01-01 RX ADMIN — CARBIDOPA AND LEVODOPA 1 HALF-TAB: 10; 100 TABLET ORAL at 11:10

## 2023-01-01 RX ADMIN — CARBIDOPA AND LEVODOPA 1 HALF-TAB: 10; 100 TABLET ORAL at 20:32

## 2023-01-01 RX ADMIN — SENNOSIDES AND DOCUSATE SODIUM 2 TABLET: 8.6; 5 TABLET ORAL at 09:02

## 2023-01-01 RX ADMIN — DORZOLAMIDE HYDROCHLORIDE AND TIMOLOL MALEATE 1 DROP: 22.3; 6.8 SOLUTION/ DROPS OPHTHALMIC at 20:28

## 2023-01-01 RX ADMIN — RAMELTEON 4 MG: 8 TABLET, FILM COATED ORAL at 20:59

## 2023-01-01 RX ADMIN — OLANZAPINE 2.5 MG: 5 TABLET, ORALLY DISINTEGRATING ORAL at 22:31

## 2023-01-01 RX ADMIN — Medication 1 TABLET: at 08:51

## 2023-01-01 RX ADMIN — PREGABALIN 50 MG: 25 CAPSULE ORAL at 08:00

## 2023-01-01 RX ADMIN — Medication 1 TABLET: at 08:29

## 2023-01-01 RX ADMIN — PREGABALIN 50 MG: 25 CAPSULE ORAL at 20:00

## 2023-01-01 RX ADMIN — Medication 1 MG: at 01:33

## 2023-01-01 RX ADMIN — OLANZAPINE 2.5 MG: 5 TABLET, ORALLY DISINTEGRATING ORAL at 22:10

## 2023-01-01 RX ADMIN — LIDOCAINE 1 PATCH: 246 PATCH TOPICAL at 20:26

## 2023-01-01 RX ADMIN — CARBIDOPA AND LEVODOPA 1 HALF-TAB: 10; 100 TABLET ORAL at 09:42

## 2023-01-01 RX ADMIN — INFLUENZA A VIRUS A/VICTORIA/4897/2022 IVR-238 (H1N1) ANTIGEN (FORMALDEHYDE INACTIVATED), INFLUENZA A VIRUS A/DARWIN/9/2021 SAN-010 (H3N2) ANTIGEN (FORMALDEHYDE INACTIVATED), INFLUENZA B VIRUS B/PHUKET/3073/2013 ANTIGEN (FORMALDEHYDE INACTIVATED), AND INFLUENZA B VIRUS B/MICHIGAN/01/2021 ANTIGEN (FORMALDEHYDE INACTIVATED) 0.7 ML: 60; 60; 60; 60 INJECTION, SUSPENSION INTRAMUSCULAR at 13:07

## 2023-01-01 RX ADMIN — AMLODIPINE BESYLATE 2.5 MG: 2.5 TABLET ORAL at 09:32

## 2023-01-01 RX ADMIN — ACETAMINOPHEN 975 MG: 325 TABLET, FILM COATED ORAL at 15:22

## 2023-01-01 RX ADMIN — RAMELTEON 8 MG: 8 TABLET ORAL at 20:54

## 2023-01-01 RX ADMIN — PREGABALIN 50 MG: 25 CAPSULE ORAL at 08:01

## 2023-01-01 RX ADMIN — ENOXAPARIN SODIUM 40 MG: 40 INJECTION SUBCUTANEOUS at 21:02

## 2023-01-01 RX ADMIN — SENNOSIDES AND DOCUSATE SODIUM 1 TABLET: 8.6; 5 TABLET ORAL at 08:01

## 2023-01-01 RX ADMIN — RAMELTEON 4 MG: 8 TABLET, FILM COATED ORAL at 20:41

## 2023-01-01 RX ADMIN — QUETIAPINE FUMARATE 25 MG: 25 TABLET ORAL at 01:06

## 2023-01-01 RX ADMIN — CARBIDOPA AND LEVODOPA 1 HALF-TAB: 10; 100 TABLET ORAL at 08:58

## 2023-01-01 RX ADMIN — QUETIAPINE FUMARATE 50 MG: 25 TABLET ORAL at 20:51

## 2023-01-01 RX ADMIN — ACETAMINOPHEN 975 MG: 325 TABLET, FILM COATED ORAL at 13:24

## 2023-01-01 RX ADMIN — DORZOLAMIDE HYDROCHLORIDE AND TIMOLOL MALEATE 1 DROP: 22.3; 6.8 SOLUTION/ DROPS OPHTHALMIC at 20:35

## 2023-01-01 RX ADMIN — CARBIDOPA AND LEVODOPA 1 HALF-TAB: 10; 100 TABLET ORAL at 08:23

## 2023-01-01 RX ADMIN — PREGABALIN 50 MG: 25 CAPSULE ORAL at 22:57

## 2023-01-01 RX ADMIN — QUETIAPINE FUMARATE 50 MG: 25 TABLET ORAL at 21:37

## 2023-01-01 RX ADMIN — PREGABALIN 50 MG: 25 CAPSULE ORAL at 08:55

## 2023-01-01 RX ADMIN — LORAZEPAM 1 MG: 2 INJECTION INTRAMUSCULAR; INTRAVENOUS at 08:26

## 2023-01-01 RX ADMIN — CARBIDOPA AND LEVODOPA 1 HALF-TAB: 10; 100 TABLET ORAL at 21:42

## 2023-01-01 RX ADMIN — CARBIDOPA AND LEVODOPA 1 HALF-TAB: 10; 100 TABLET ORAL at 09:10

## 2023-01-01 RX ADMIN — SENNOSIDES AND DOCUSATE SODIUM 1 TABLET: 8.6; 5 TABLET ORAL at 20:39

## 2023-01-01 RX ADMIN — HYDROMORPHONE HYDROCHLORIDE 0.2 MG: 0.2 INJECTION, SOLUTION INTRAMUSCULAR; INTRAVENOUS; SUBCUTANEOUS at 22:13

## 2023-01-01 RX ADMIN — Medication 1 TABLET: at 09:41

## 2023-01-01 RX ADMIN — PREGABALIN 50 MG: 25 CAPSULE ORAL at 22:24

## 2023-01-01 RX ADMIN — ACETAMINOPHEN 975 MG: 325 TABLET, FILM COATED ORAL at 21:51

## 2023-01-01 RX ADMIN — TRAZODONE HYDROCHLORIDE 100 MG: 50 TABLET ORAL at 02:37

## 2023-01-01 RX ADMIN — RAMELTEON 8 MG: 8 TABLET ORAL at 22:24

## 2023-01-01 RX ADMIN — CARBIDOPA AND LEVODOPA 1 HALF-TAB: 10; 100 TABLET ORAL at 12:27

## 2023-01-01 RX ADMIN — AMLODIPINE BESYLATE 2.5 MG: 2.5 TABLET ORAL at 09:10

## 2023-01-01 RX ADMIN — LATANOPROST 1 DROP: 50 SOLUTION OPHTHALMIC at 20:37

## 2023-01-01 RX ADMIN — QUETIAPINE FUMARATE 25 MG: 25 TABLET ORAL at 02:13

## 2023-01-01 RX ADMIN — ENOXAPARIN SODIUM 40 MG: 40 INJECTION SUBCUTANEOUS at 16:36

## 2023-01-01 RX ADMIN — QUETIAPINE FUMARATE 25 MG: 25 TABLET ORAL at 09:27

## 2023-01-01 RX ADMIN — QUETIAPINE FUMARATE 50 MG: 25 TABLET ORAL at 20:44

## 2023-01-01 RX ADMIN — LIDOCAINE 1 PATCH: 246 PATCH TOPICAL at 20:53

## 2023-01-01 RX ADMIN — QUETIAPINE FUMARATE 50 MG: 25 TABLET ORAL at 20:39

## 2023-01-01 RX ADMIN — CARBIDOPA AND LEVODOPA 1 HALF-TAB: 10; 100 TABLET ORAL at 10:22

## 2023-01-01 RX ADMIN — CARBIDOPA AND LEVODOPA 1 HALF-TAB: 10; 100 TABLET ORAL at 08:50

## 2023-01-01 RX ADMIN — HYDROMORPHONE HYDROCHLORIDE 0.2 MG: 0.2 INJECTION, SOLUTION INTRAMUSCULAR; INTRAVENOUS; SUBCUTANEOUS at 11:27

## 2023-01-01 RX ADMIN — LATANOPROST 1 DROP: 50 SOLUTION OPHTHALMIC at 20:44

## 2023-01-01 RX ADMIN — LATANOPROST 1 DROP: 50 SOLUTION OPHTHALMIC at 22:23

## 2023-01-01 RX ADMIN — HYDROMORPHONE HYDROCHLORIDE 1 MG: 2 TABLET ORAL at 04:56

## 2023-01-01 RX ADMIN — Medication 12.5 MG: at 15:20

## 2023-01-01 RX ADMIN — QUETIAPINE FUMARATE 25 MG: 25 TABLET ORAL at 01:08

## 2023-01-01 RX ADMIN — TRAZODONE HYDROCHLORIDE 100 MG: 50 TABLET ORAL at 20:39

## 2023-01-01 RX ADMIN — ACETAMINOPHEN 975 MG: 325 TABLET, FILM COATED ORAL at 23:51

## 2023-01-01 RX ADMIN — Medication 1 TABLET: at 09:21

## 2023-01-01 RX ADMIN — PREGABALIN 50 MG: 25 CAPSULE ORAL at 20:36

## 2023-01-01 RX ADMIN — PREGABALIN 50 MG: 25 CAPSULE ORAL at 09:09

## 2023-01-01 RX ADMIN — RAMELTEON 8 MG: 8 TABLET ORAL at 20:49

## 2023-01-01 RX ADMIN — RAMELTEON 4 MG: 8 TABLET, FILM COATED ORAL at 20:39

## 2023-01-01 RX ADMIN — CARBIDOPA AND LEVODOPA 1 HALF-TAB: 10; 100 TABLET ORAL at 14:01

## 2023-01-01 RX ADMIN — AMLODIPINE BESYLATE 2.5 MG: 2.5 TABLET ORAL at 09:21

## 2023-01-01 RX ADMIN — SENNOSIDES AND DOCUSATE SODIUM 2 TABLET: 8.6; 5 TABLET ORAL at 09:58

## 2023-01-01 RX ADMIN — ACETAMINOPHEN 975 MG: 325 TABLET, FILM COATED ORAL at 14:37

## 2023-01-01 RX ADMIN — DORZOLAMIDE HYDROCHLORIDE AND TIMOLOL MALEATE 1 DROP: 22.3; 6.8 SOLUTION/ DROPS OPHTHALMIC at 22:23

## 2023-01-01 RX ADMIN — PREGABALIN 50 MG: 25 CAPSULE ORAL at 08:32

## 2023-01-01 RX ADMIN — DORZOLAMIDE HYDROCHLORIDE AND TIMOLOL MALEATE 1 DROP: 22.3; 6.8 SOLUTION/ DROPS OPHTHALMIC at 10:56

## 2023-01-01 RX ADMIN — DORZOLAMIDE HYDROCHLORIDE AND TIMOLOL MALEATE 1 DROP: 22.3; 6.8 SOLUTION/ DROPS OPHTHALMIC at 20:03

## 2023-01-01 RX ADMIN — ACETAMINOPHEN 975 MG: 325 TABLET, FILM COATED ORAL at 16:40

## 2023-01-01 RX ADMIN — DORZOLAMIDE HYDROCHLORIDE AND TIMOLOL MALEATE 1 DROP: 22.3; 6.8 SOLUTION/ DROPS OPHTHALMIC at 09:28

## 2023-01-01 RX ADMIN — QUETIAPINE FUMARATE 25 MG: 25 TABLET ORAL at 23:07

## 2023-01-01 RX ADMIN — HYDRALAZINE HYDROCHLORIDE 20 MG: 10 TABLET, FILM COATED ORAL at 00:20

## 2023-01-01 RX ADMIN — HYDROMORPHONE HYDROCHLORIDE 1 MG: 2 TABLET ORAL at 16:56

## 2023-01-01 RX ADMIN — CARBIDOPA AND LEVODOPA 1 HALF-TAB: 10; 100 TABLET ORAL at 08:32

## 2023-01-01 RX ADMIN — PREGABALIN 50 MG: 25 CAPSULE ORAL at 10:00

## 2023-01-01 RX ADMIN — ACETAMINOPHEN 975 MG: 325 TABLET, FILM COATED ORAL at 20:46

## 2023-01-01 RX ADMIN — SENNOSIDES AND DOCUSATE SODIUM 1 TABLET: 8.6; 5 TABLET ORAL at 20:32

## 2023-01-01 RX ADMIN — CARBIDOPA AND LEVODOPA 1 HALF-TAB: 10; 100 TABLET ORAL at 20:00

## 2023-01-01 RX ADMIN — Medication 1 TABLET: at 08:02

## 2023-01-01 RX ADMIN — ACETAMINOPHEN 975 MG: 325 TABLET, FILM COATED ORAL at 12:48

## 2023-01-01 RX ADMIN — Medication 12.5 MG: at 12:27

## 2023-01-01 RX ADMIN — RAMELTEON 4 MG: 8 TABLET, FILM COATED ORAL at 20:35

## 2023-01-01 RX ADMIN — PREGABALIN 50 MG: 25 CAPSULE ORAL at 20:29

## 2023-01-01 RX ADMIN — Medication 12.5 MG: at 08:58

## 2023-01-01 RX ADMIN — CARBIDOPA AND LEVODOPA 1 HALF-TAB: 10; 100 TABLET ORAL at 08:55

## 2023-01-01 RX ADMIN — SENNOSIDES AND DOCUSATE SODIUM 1 TABLET: 8.6; 5 TABLET ORAL at 07:47

## 2023-01-01 RX ADMIN — CARBIDOPA AND LEVODOPA 1 TABLET: 25; 100 TABLET ORAL at 20:54

## 2023-01-01 RX ADMIN — DORZOLAMIDE HYDROCHLORIDE AND TIMOLOL MALEATE 1 DROP: 22.3; 6.8 SOLUTION/ DROPS OPHTHALMIC at 20:14

## 2023-01-01 RX ADMIN — SENNOSIDES AND DOCUSATE SODIUM 2 TABLET: 8.6; 5 TABLET ORAL at 22:57

## 2023-01-01 RX ADMIN — RAMELTEON 8 MG: 8 TABLET ORAL at 21:37

## 2023-01-01 RX ADMIN — CARBIDOPA AND LEVODOPA 1 HALF-TAB: 10; 100 TABLET ORAL at 07:46

## 2023-01-01 RX ADMIN — HYDROMORPHONE HYDROCHLORIDE 0.2 MG: 0.2 INJECTION, SOLUTION INTRAMUSCULAR; INTRAVENOUS; SUBCUTANEOUS at 04:33

## 2023-01-01 RX ADMIN — LORAZEPAM 0.5 MG: 2 INJECTION INTRAMUSCULAR; INTRAVENOUS at 08:04

## 2023-01-01 RX ADMIN — RAMELTEON 4 MG: 8 TABLET, FILM COATED ORAL at 20:00

## 2023-01-01 RX ADMIN — QUETIAPINE FUMARATE 50 MG: 25 TABLET ORAL at 20:46

## 2023-01-01 RX ADMIN — SENNOSIDES AND DOCUSATE SODIUM 2 TABLET: 8.6; 5 TABLET ORAL at 20:00

## 2023-01-01 RX ADMIN — CARBIDOPA AND LEVODOPA 1 HALF-TAB: 10; 100 TABLET ORAL at 13:58

## 2023-01-01 RX ADMIN — DORZOLAMIDE HYDROCHLORIDE AND TIMOLOL MALEATE 1 DROP: 22.3; 6.8 SOLUTION/ DROPS OPHTHALMIC at 09:00

## 2023-01-01 RX ADMIN — Medication 1 MG: at 03:11

## 2023-01-01 RX ADMIN — DORZOLAMIDE HYDROCHLORIDE AND TIMOLOL MALEATE 1 DROP: 22.3; 6.8 SOLUTION/ DROPS OPHTHALMIC at 10:22

## 2023-01-01 RX ADMIN — QUETIAPINE FUMARATE 25 MG: 25 TABLET ORAL at 16:02

## 2023-01-01 RX ADMIN — OLANZAPINE 2.5 MG: 5 TABLET, ORALLY DISINTEGRATING ORAL at 09:53

## 2023-01-01 RX ADMIN — DORZOLAMIDE HYDROCHLORIDE AND TIMOLOL MALEATE 1 DROP: 22.3; 6.8 SOLUTION/ DROPS OPHTHALMIC at 21:39

## 2023-01-01 RX ADMIN — POLYETHYLENE GLYCOL 3350 17 G: 17 POWDER, FOR SOLUTION ORAL at 10:23

## 2023-01-01 RX ADMIN — SENNOSIDES AND DOCUSATE SODIUM 1 TABLET: 8.6; 5 TABLET ORAL at 08:02

## 2023-01-01 RX ADMIN — QUETIAPINE FUMARATE 50 MG: 25 TABLET ORAL at 20:27

## 2023-01-01 RX ADMIN — Medication 12.5 MG: at 18:08

## 2023-01-01 RX ADMIN — CARBIDOPA AND LEVODOPA 1 HALF-TAB: 10; 100 TABLET ORAL at 14:29

## 2023-01-01 RX ADMIN — SENNOSIDES AND DOCUSATE SODIUM 1 TABLET: 8.6; 5 TABLET ORAL at 09:46

## 2023-01-01 RX ADMIN — ACETAMINOPHEN 975 MG: 325 TABLET, FILM COATED ORAL at 13:16

## 2023-01-01 RX ADMIN — DORZOLAMIDE HYDROCHLORIDE AND TIMOLOL MALEATE 1 DROP: 22.3; 6.8 SOLUTION/ DROPS OPHTHALMIC at 20:58

## 2023-01-01 RX ADMIN — TRAZODONE HYDROCHLORIDE 100 MG: 50 TABLET ORAL at 21:13

## 2023-01-01 RX ADMIN — ACETAMINOPHEN 975 MG: 325 TABLET, FILM COATED ORAL at 08:07

## 2023-01-01 RX ADMIN — AMLODIPINE BESYLATE 2.5 MG: 2.5 TABLET ORAL at 08:33

## 2023-01-01 RX ADMIN — HYDROMORPHONE HYDROCHLORIDE 1 MG: 2 TABLET ORAL at 00:20

## 2023-01-01 RX ADMIN — QUETIAPINE FUMARATE 25 MG: 25 TABLET ORAL at 01:54

## 2023-01-01 RX ADMIN — DORZOLAMIDE HYDROCHLORIDE AND TIMOLOL MALEATE 1 DROP: 22.3; 6.8 SOLUTION/ DROPS OPHTHALMIC at 21:48

## 2023-01-01 RX ADMIN — RAMELTEON 8 MG: 8 TABLET ORAL at 20:29

## 2023-01-01 RX ADMIN — PREGABALIN 50 MG: 25 CAPSULE ORAL at 21:46

## 2023-01-01 RX ADMIN — LIDOCAINE 1 PATCH: 246 PATCH TOPICAL at 19:50

## 2023-01-01 RX ADMIN — QUETIAPINE FUMARATE 25 MG: 25 TABLET ORAL at 15:14

## 2023-01-01 RX ADMIN — PREGABALIN 50 MG: 25 CAPSULE ORAL at 12:27

## 2023-01-01 RX ADMIN — LATANOPROST 1 DROP: 50 SOLUTION OPHTHALMIC at 20:47

## 2023-01-01 RX ADMIN — QUETIAPINE FUMARATE 50 MG: 25 TABLET ORAL at 22:24

## 2023-01-01 RX ADMIN — Medication 1 TABLET: at 10:03

## 2023-01-01 RX ADMIN — LIDOCAINE 1 PATCH: 246 PATCH TOPICAL at 20:44

## 2023-01-01 RX ADMIN — QUETIAPINE FUMARATE 50 MG: 25 TABLET ORAL at 22:57

## 2023-01-01 RX ADMIN — RAMELTEON 8 MG: 8 TABLET ORAL at 22:56

## 2023-01-01 RX ADMIN — CARBIDOPA AND LEVODOPA 1 HALF-TAB: 10; 100 TABLET ORAL at 10:00

## 2023-01-01 RX ADMIN — QUETIAPINE FUMARATE 25 MG: 25 TABLET ORAL at 09:36

## 2023-01-01 RX ADMIN — PREGABALIN 50 MG: 25 CAPSULE ORAL at 09:02

## 2023-01-01 RX ADMIN — ACETAMINOPHEN 975 MG: 325 TABLET, FILM COATED ORAL at 06:22

## 2023-01-01 RX ADMIN — ACETAMINOPHEN 975 MG: 325 TABLET, FILM COATED ORAL at 04:56

## 2023-01-01 RX ADMIN — OLANZAPINE 2.5 MG: 10 INJECTION, POWDER, LYOPHILIZED, FOR SOLUTION INTRAMUSCULAR at 20:41

## 2023-01-01 RX ADMIN — PREGABALIN 50 MG: 25 CAPSULE ORAL at 09:36

## 2023-01-01 RX ADMIN — CARBIDOPA AND LEVODOPA 1 TABLET: 10; 100 TABLET ORAL at 11:55

## 2023-01-01 RX ADMIN — Medication 1 TABLET: at 09:33

## 2023-01-01 RX ADMIN — Medication 1 TABLET: at 09:58

## 2023-01-01 RX ADMIN — RAMELTEON 4 MG: 8 TABLET, FILM COATED ORAL at 20:04

## 2023-01-01 RX ADMIN — SENNOSIDES AND DOCUSATE SODIUM 1 TABLET: 8.6; 5 TABLET ORAL at 10:30

## 2023-01-01 RX ADMIN — CARBIDOPA AND LEVODOPA 1 HALF-TAB: 10; 100 TABLET ORAL at 16:44

## 2023-01-01 RX ADMIN — AMLODIPINE BESYLATE 2.5 MG: 2.5 TABLET ORAL at 09:49

## 2023-01-01 RX ADMIN — CARBIDOPA AND LEVODOPA 1 HALF-TAB: 10; 100 TABLET ORAL at 08:06

## 2023-01-01 RX ADMIN — ENOXAPARIN SODIUM 30 MG: 30 INJECTION SUBCUTANEOUS at 18:50

## 2023-01-01 RX ADMIN — ACETAMINOPHEN 975 MG: 325 TABLET, FILM COATED ORAL at 09:36

## 2023-01-01 RX ADMIN — PREGABALIN 50 MG: 25 CAPSULE ORAL at 09:22

## 2023-01-01 RX ADMIN — AMLODIPINE BESYLATE 2.5 MG: 2.5 TABLET ORAL at 08:02

## 2023-01-01 RX ADMIN — TRAZODONE HYDROCHLORIDE 100 MG: 50 TABLET ORAL at 21:44

## 2023-01-01 RX ADMIN — LIDOCAINE 1 PATCH: 246 PATCH TOPICAL at 19:28

## 2023-01-01 RX ADMIN — QUETIAPINE FUMARATE 12.5 MG: 25 TABLET ORAL at 12:22

## 2023-01-01 RX ADMIN — PREGABALIN 50 MG: 25 CAPSULE ORAL at 20:11

## 2023-01-01 RX ADMIN — POLYETHYLENE GLYCOL 3350 17 G: 17 POWDER, FOR SOLUTION ORAL at 08:26

## 2023-01-01 RX ADMIN — QUETIAPINE FUMARATE 50 MG: 25 TABLET ORAL at 20:32

## 2023-01-01 RX ADMIN — SENNOSIDES AND DOCUSATE SODIUM 1 TABLET: 8.6; 5 TABLET ORAL at 08:35

## 2023-01-01 RX ADMIN — QUETIAPINE FUMARATE 50 MG: 25 TABLET ORAL at 20:43

## 2023-01-01 RX ADMIN — CARBIDOPA AND LEVODOPA 1 HALF-TAB: 10; 100 TABLET ORAL at 16:03

## 2023-01-01 RX ADMIN — CARBIDOPA AND LEVODOPA 1 HALF-TAB: 10; 100 TABLET ORAL at 13:22

## 2023-01-01 RX ADMIN — LORAZEPAM 1 MG: 0.5 TABLET ORAL at 18:02

## 2023-01-01 RX ADMIN — Medication 12.5 MG: at 09:30

## 2023-01-01 RX ADMIN — LATANOPROST 1 DROP: 50 SOLUTION OPHTHALMIC at 21:46

## 2023-01-01 RX ADMIN — ENOXAPARIN SODIUM 30 MG: 30 INJECTION SUBCUTANEOUS at 18:42

## 2023-01-01 RX ADMIN — PREGABALIN 50 MG: 25 CAPSULE ORAL at 20:27

## 2023-01-01 RX ADMIN — ACETAMINOPHEN 975 MG: 325 TABLET, FILM COATED ORAL at 05:45

## 2023-01-01 RX ADMIN — DORZOLAMIDE HYDROCHLORIDE AND TIMOLOL MALEATE 1 DROP: 22.3; 6.8 SOLUTION/ DROPS OPHTHALMIC at 20:02

## 2023-01-01 RX ADMIN — DORZOLAMIDE HYDROCHLORIDE AND TIMOLOL MALEATE 1 DROP: 22.3; 6.8 SOLUTION/ DROPS OPHTHALMIC at 09:11

## 2023-01-01 RX ADMIN — LATANOPROST 1 DROP: 50 SOLUTION OPHTHALMIC at 21:57

## 2023-01-01 RX ADMIN — QUETIAPINE FUMARATE 50 MG: 25 TABLET ORAL at 20:59

## 2023-01-01 RX ADMIN — QUETIAPINE FUMARATE 25 MG: 25 TABLET ORAL at 00:14

## 2023-01-01 RX ADMIN — QUETIAPINE FUMARATE 12.5 MG: 25 TABLET ORAL at 01:07

## 2023-01-01 RX ADMIN — QUETIAPINE FUMARATE 25 MG: 25 TABLET ORAL at 23:22

## 2023-01-01 RX ADMIN — QUETIAPINE FUMARATE 50 MG: 25 TABLET ORAL at 02:39

## 2023-01-01 RX ADMIN — DORZOLAMIDE HYDROCHLORIDE AND TIMOLOL MALEATE 1 DROP: 22.3; 6.8 SOLUTION/ DROPS OPHTHALMIC at 09:34

## 2023-01-01 RX ADMIN — ACETAMINOPHEN 975 MG: 325 TABLET, FILM COATED ORAL at 09:41

## 2023-01-01 RX ADMIN — POLYETHYLENE GLYCOL 3350 17 G: 17 POWDER, FOR SOLUTION ORAL at 20:01

## 2023-01-01 RX ADMIN — Medication 1 MG: at 03:45

## 2023-01-01 RX ADMIN — LIDOCAINE 1 PATCH: 246 PATCH TOPICAL at 20:14

## 2023-01-01 RX ADMIN — DORZOLAMIDE HYDROCHLORIDE AND TIMOLOL MALEATE 1 DROP: 22.3; 6.8 SOLUTION/ DROPS OPHTHALMIC at 08:03

## 2023-01-01 RX ADMIN — SENNOSIDES AND DOCUSATE SODIUM 1 TABLET: 8.6; 5 TABLET ORAL at 08:34

## 2023-01-01 RX ADMIN — Medication 1 TABLET: at 09:37

## 2023-01-01 RX ADMIN — DORZOLAMIDE HYDROCHLORIDE AND TIMOLOL MALEATE 1 DROP: 22.3; 6.8 SOLUTION/ DROPS OPHTHALMIC at 09:05

## 2023-01-01 RX ADMIN — AMLODIPINE BESYLATE 2.5 MG: 2.5 TABLET ORAL at 09:02

## 2023-01-01 RX ADMIN — AMLODIPINE BESYLATE 2.5 MG: 2.5 TABLET ORAL at 08:55

## 2023-01-01 RX ADMIN — Medication 12.5 MG: at 14:18

## 2023-01-01 RX ADMIN — SENNOSIDES AND DOCUSATE SODIUM 1 TABLET: 8.6; 5 TABLET ORAL at 10:03

## 2023-01-01 RX ADMIN — QUETIAPINE FUMARATE 25 MG: 25 TABLET ORAL at 03:54

## 2023-01-01 RX ADMIN — QUETIAPINE FUMARATE 25 MG: 25 TABLET ORAL at 11:44

## 2023-01-01 RX ADMIN — PREGABALIN 50 MG: 25 CAPSULE ORAL at 09:41

## 2023-01-01 RX ADMIN — Medication 1 TABLET: at 08:01

## 2023-01-01 RX ADMIN — TRAZODONE HYDROCHLORIDE 100 MG: 50 TABLET ORAL at 20:32

## 2023-01-01 RX ADMIN — Medication 12.5 MG: at 12:56

## 2023-01-01 RX ADMIN — Medication 12.5 MG: at 08:23

## 2023-01-01 RX ADMIN — TRAZODONE HYDROCHLORIDE 100 MG: 50 TABLET ORAL at 20:37

## 2023-01-01 RX ADMIN — ENOXAPARIN SODIUM 30 MG: 30 INJECTION SUBCUTANEOUS at 18:16

## 2023-01-01 RX ADMIN — OLANZAPINE 2.5 MG: 5 TABLET, ORALLY DISINTEGRATING ORAL at 14:53

## 2023-01-01 RX ADMIN — ACETAMINOPHEN 975 MG: 325 TABLET, FILM COATED ORAL at 09:22

## 2023-01-01 RX ADMIN — DORZOLAMIDE HYDROCHLORIDE AND TIMOLOL MALEATE 1 DROP: 22.3; 6.8 SOLUTION/ DROPS OPHTHALMIC at 07:48

## 2023-01-01 RX ADMIN — PREGABALIN 50 MG: 25 CAPSULE ORAL at 09:00

## 2023-01-01 RX ADMIN — TRAZODONE HYDROCHLORIDE 100 MG: 50 TABLET ORAL at 20:46

## 2023-01-01 RX ADMIN — DORZOLAMIDE HYDROCHLORIDE AND TIMOLOL MALEATE 1 DROP: 22.3; 6.8 SOLUTION/ DROPS OPHTHALMIC at 20:11

## 2023-01-01 RX ADMIN — ACETAMINOPHEN 975 MG: 325 TABLET, FILM COATED ORAL at 17:48

## 2023-01-01 RX ADMIN — OLANZAPINE 2.5 MG: 10 INJECTION, POWDER, LYOPHILIZED, FOR SOLUTION INTRAMUSCULAR at 09:58

## 2023-01-01 RX ADMIN — SENNOSIDES AND DOCUSATE SODIUM 1 TABLET: 8.6; 5 TABLET ORAL at 08:06

## 2023-01-01 RX ADMIN — AMLODIPINE BESYLATE 2.5 MG: 2.5 TABLET ORAL at 10:11

## 2023-01-01 RX ADMIN — POLYETHYLENE GLYCOL 3350 17 G: 17 POWDER, FOR SOLUTION ORAL at 09:23

## 2023-01-01 RX ADMIN — LIDOCAINE 1 PATCH: 246 PATCH TOPICAL at 20:12

## 2023-01-01 RX ADMIN — QUETIAPINE FUMARATE 12.5 MG: 25 TABLET ORAL at 18:28

## 2023-01-01 RX ADMIN — CARBIDOPA AND LEVODOPA 1 HALF-TAB: 10; 100 TABLET ORAL at 08:08

## 2023-01-01 RX ADMIN — POLYETHYLENE GLYCOL 3350 17 G: 17 POWDER, FOR SOLUTION ORAL at 20:34

## 2023-01-01 RX ADMIN — Medication 12.5 MG: at 08:34

## 2023-01-01 RX ADMIN — Medication 12.5 MG: at 07:45

## 2023-01-01 RX ADMIN — HYDROMORPHONE HYDROCHLORIDE 1 MG: 2 TABLET ORAL at 16:43

## 2023-01-01 RX ADMIN — PREGABALIN 50 MG: 25 CAPSULE ORAL at 20:10

## 2023-01-01 RX ADMIN — PREGABALIN 50 MG: 25 CAPSULE ORAL at 20:43

## 2023-01-01 RX ADMIN — QUETIAPINE FUMARATE 25 MG: 25 TABLET ORAL at 10:32

## 2023-01-01 RX ADMIN — POLYETHYLENE GLYCOL 3350 17 G: 17 POWDER, FOR SOLUTION ORAL at 08:30

## 2023-01-01 RX ADMIN — AMLODIPINE BESYLATE 2.5 MG: 2.5 TABLET ORAL at 08:30

## 2023-01-01 RX ADMIN — Medication 12.5 MG: at 14:35

## 2023-01-01 RX ADMIN — AMLODIPINE BESYLATE 7.5 MG: 5 TABLET ORAL at 09:27

## 2023-01-01 RX ADMIN — HYDROMORPHONE HYDROCHLORIDE 1 MG: 2 TABLET ORAL at 06:22

## 2023-01-01 RX ADMIN — CARBIDOPA AND LEVODOPA 1 HALF-TAB: 10; 100 TABLET ORAL at 08:59

## 2023-01-01 RX ADMIN — DORZOLAMIDE HYDROCHLORIDE AND TIMOLOL MALEATE 1 DROP: 22.3; 6.8 SOLUTION/ DROPS OPHTHALMIC at 08:40

## 2023-01-01 RX ADMIN — PREGABALIN 50 MG: 25 CAPSULE ORAL at 08:27

## 2023-01-01 RX ADMIN — QUETIAPINE FUMARATE 50 MG: 25 TABLET ORAL at 21:35

## 2023-01-01 RX ADMIN — OLANZAPINE 2.5 MG: 5 TABLET, ORALLY DISINTEGRATING ORAL at 17:44

## 2023-01-01 RX ADMIN — Medication 12.5 MG: at 16:04

## 2023-01-01 RX ADMIN — TRAZODONE HYDROCHLORIDE 100 MG: 50 TABLET ORAL at 20:19

## 2023-01-01 RX ADMIN — CARBIDOPA AND LEVODOPA 1 HALF-TAB: 10; 100 TABLET ORAL at 09:21

## 2023-01-01 RX ADMIN — ACETAMINOPHEN 975 MG: 325 TABLET, FILM COATED ORAL at 20:51

## 2023-01-01 RX ADMIN — ACETAMINOPHEN 975 MG: 325 TABLET, FILM COATED ORAL at 09:02

## 2023-01-01 RX ADMIN — POLYETHYLENE GLYCOL 3350 17 G: 17 POWDER, FOR SOLUTION ORAL at 08:32

## 2023-01-01 RX ADMIN — Medication 12.5 MG: at 09:03

## 2023-01-01 RX ADMIN — Medication 1 TABLET: at 08:55

## 2023-01-01 RX ADMIN — ACETAMINOPHEN 975 MG: 325 TABLET, FILM COATED ORAL at 06:33

## 2023-01-01 RX ADMIN — ACETAMINOPHEN 975 MG: 325 TABLET, FILM COATED ORAL at 09:21

## 2023-01-01 RX ADMIN — QUETIAPINE FUMARATE 25 MG: 25 TABLET ORAL at 09:09

## 2023-01-01 RX ADMIN — QUETIAPINE FUMARATE 12.5 MG: 25 TABLET ORAL at 23:50

## 2023-01-01 RX ADMIN — QUETIAPINE FUMARATE 50 MG: 25 TABLET ORAL at 20:18

## 2023-01-01 RX ADMIN — CARBIDOPA AND LEVODOPA 1 HALF-TAB: 10; 100 TABLET ORAL at 13:24

## 2023-01-01 RX ADMIN — SENNOSIDES AND DOCUSATE SODIUM 1 TABLET: 8.6; 5 TABLET ORAL at 08:51

## 2023-01-01 RX ADMIN — CARBIDOPA AND LEVODOPA 1 HALF-TAB: 10; 100 TABLET ORAL at 20:52

## 2023-01-01 RX ADMIN — ENOXAPARIN SODIUM 30 MG: 30 INJECTION SUBCUTANEOUS at 18:01

## 2023-01-01 RX ADMIN — DORZOLAMIDE HYDROCHLORIDE AND TIMOLOL MALEATE 1 DROP: 22.3; 6.8 SOLUTION/ DROPS OPHTHALMIC at 21:06

## 2023-01-01 RX ADMIN — AMLODIPINE BESYLATE 2.5 MG: 2.5 TABLET ORAL at 08:06

## 2023-01-01 RX ADMIN — PREGABALIN 50 MG: 25 CAPSULE ORAL at 09:26

## 2023-01-01 RX ADMIN — PREGABALIN 50 MG: 25 CAPSULE ORAL at 20:19

## 2023-01-01 RX ADMIN — DORZOLAMIDE HYDROCHLORIDE AND TIMOLOL MALEATE 1 DROP: 22.3; 6.8 SOLUTION/ DROPS OPHTHALMIC at 09:40

## 2023-01-01 RX ADMIN — AMLODIPINE BESYLATE 2.5 MG: 2.5 TABLET ORAL at 09:46

## 2023-01-01 RX ADMIN — ACETAMINOPHEN 975 MG: 325 TABLET, FILM COATED ORAL at 00:26

## 2023-01-01 RX ADMIN — LIDOCAINE 1 PATCH: 246 PATCH TOPICAL at 20:15

## 2023-01-01 RX ADMIN — HYDROMORPHONE HYDROCHLORIDE 1 MG: 2 TABLET ORAL at 12:21

## 2023-01-01 RX ADMIN — ACETAMINOPHEN 975 MG: 325 TABLET ORAL at 20:51

## 2023-01-01 RX ADMIN — SENNOSIDES AND DOCUSATE SODIUM 1 TABLET: 8.6; 5 TABLET ORAL at 09:41

## 2023-01-01 RX ADMIN — DORZOLAMIDE HYDROCHLORIDE AND TIMOLOL MALEATE 1 DROP: 22.3; 6.8 SOLUTION/ DROPS OPHTHALMIC at 20:49

## 2023-01-01 RX ADMIN — ACETAMINOPHEN 975 MG: 325 TABLET, FILM COATED ORAL at 06:27

## 2023-01-01 RX ADMIN — DORZOLAMIDE HYDROCHLORIDE AND TIMOLOL MALEATE 1 DROP: 22.3; 6.8 SOLUTION/ DROPS OPHTHALMIC at 08:13

## 2023-01-01 RX ADMIN — CARBIDOPA AND LEVODOPA 1 HALF-TAB: 10; 100 TABLET ORAL at 09:41

## 2023-01-01 RX ADMIN — QUETIAPINE FUMARATE 50 MG: 25 TABLET ORAL at 20:58

## 2023-01-01 RX ADMIN — TRAZODONE HYDROCHLORIDE 100 MG: 50 TABLET ORAL at 22:25

## 2023-01-01 RX ADMIN — ACETAMINOPHEN 975 MG: 325 TABLET, FILM COATED ORAL at 17:36

## 2023-01-01 RX ADMIN — CARBIDOPA AND LEVODOPA 1 HALF-TAB: 10; 100 TABLET ORAL at 09:27

## 2023-01-01 RX ADMIN — ACETAMINOPHEN 975 MG: 325 TABLET, FILM COATED ORAL at 09:00

## 2023-01-01 RX ADMIN — TRAZODONE HYDROCHLORIDE 100 MG: 50 TABLET ORAL at 21:01

## 2023-01-01 RX ADMIN — Medication 12.5 MG: at 08:35

## 2023-01-01 RX ADMIN — PREGABALIN 50 MG: 25 CAPSULE ORAL at 21:13

## 2023-01-01 RX ADMIN — TRAZODONE HYDROCHLORIDE 100 MG: 50 TABLET ORAL at 20:20

## 2023-01-01 RX ADMIN — OLANZAPINE 2.5 MG: 10 INJECTION, POWDER, LYOPHILIZED, FOR SOLUTION INTRAMUSCULAR at 03:21

## 2023-01-01 RX ADMIN — ACETAMINOPHEN 975 MG: 325 TABLET, FILM COATED ORAL at 16:14

## 2023-01-01 RX ADMIN — SENNOSIDES AND DOCUSATE SODIUM 1 TABLET: 8.6; 5 TABLET ORAL at 08:55

## 2023-01-01 RX ADMIN — QUETIAPINE FUMARATE 12.5 MG: 25 TABLET ORAL at 22:05

## 2023-01-01 RX ADMIN — ACETAMINOPHEN 975 MG: 325 TABLET, FILM COATED ORAL at 10:04

## 2023-01-01 RX ADMIN — Medication 12.5 MG: at 09:42

## 2023-01-01 RX ADMIN — OLANZAPINE 5 MG: 10 INJECTION, POWDER, LYOPHILIZED, FOR SOLUTION INTRAMUSCULAR at 01:09

## 2023-01-01 RX ADMIN — TRAZODONE HYDROCHLORIDE 100 MG: 50 TABLET ORAL at 20:51

## 2023-01-01 RX ADMIN — ENOXAPARIN SODIUM 40 MG: 40 INJECTION SUBCUTANEOUS at 09:41

## 2023-01-01 RX ADMIN — DORZOLAMIDE HYDROCHLORIDE AND TIMOLOL MALEATE 1 DROP: 22.3; 6.8 SOLUTION/ DROPS OPHTHALMIC at 08:27

## 2023-01-01 RX ADMIN — QUETIAPINE FUMARATE 25 MG: 25 TABLET ORAL at 02:01

## 2023-01-01 RX ADMIN — Medication 12.5 MG: at 09:47

## 2023-01-01 RX ADMIN — PREGABALIN 50 MG: 25 CAPSULE ORAL at 08:02

## 2023-01-01 RX ADMIN — TRAZODONE HYDROCHLORIDE 100 MG: 50 TABLET ORAL at 22:19

## 2023-01-01 RX ADMIN — LATANOPROST 1 DROP: 50 SOLUTION OPHTHALMIC at 20:41

## 2023-01-01 RX ADMIN — AMLODIPINE BESYLATE 7.5 MG: 5 TABLET ORAL at 08:07

## 2023-01-01 RX ADMIN — LORAZEPAM 1 MG: 2 INJECTION INTRAMUSCULAR; INTRAVENOUS at 20:27

## 2023-01-01 RX ADMIN — Medication 12.5 MG: at 09:21

## 2023-01-01 RX ADMIN — ACETAMINOPHEN 975 MG: 325 TABLET, FILM COATED ORAL at 15:05

## 2023-01-01 RX ADMIN — SENNOSIDES AND DOCUSATE SODIUM 2 TABLET: 8.6; 5 TABLET ORAL at 21:07

## 2023-01-01 RX ADMIN — ACETAMINOPHEN 975 MG: 325 TABLET, FILM COATED ORAL at 08:55

## 2023-01-01 RX ADMIN — DORZOLAMIDE HYDROCHLORIDE AND TIMOLOL MALEATE 1 DROP: 22.3; 6.8 SOLUTION/ DROPS OPHTHALMIC at 20:52

## 2023-01-01 RX ADMIN — RAMELTEON 4 MG: 8 TABLET, FILM COATED ORAL at 20:14

## 2023-01-01 RX ADMIN — CARBIDOPA AND LEVODOPA 1 HALF-TAB: 10; 100 TABLET ORAL at 08:01

## 2023-01-01 RX ADMIN — ACETAMINOPHEN 975 MG: 325 TABLET, FILM COATED ORAL at 05:16

## 2023-01-01 RX ADMIN — CARBIDOPA AND LEVODOPA 1 HALF-TAB: 10; 100 TABLET ORAL at 20:10

## 2023-01-01 RX ADMIN — CARBIDOPA AND LEVODOPA 1 HALF-TAB: 10; 100 TABLET ORAL at 22:01

## 2023-01-01 RX ADMIN — QUETIAPINE FUMARATE 50 MG: 25 TABLET ORAL at 20:28

## 2023-01-01 RX ADMIN — DORZOLAMIDE HYDROCHLORIDE AND TIMOLOL MALEATE 1 DROP: 22.3; 6.8 SOLUTION/ DROPS OPHTHALMIC at 10:03

## 2023-01-01 RX ADMIN — QUETIAPINE FUMARATE 25 MG: 25 TABLET ORAL at 10:55

## 2023-01-01 RX ADMIN — Medication 12.5 MG: at 14:34

## 2023-01-01 RX ADMIN — LATANOPROST 1 DROP: 50 SOLUTION OPHTHALMIC at 21:13

## 2023-01-01 RX ADMIN — Medication 1 TABLET: at 08:07

## 2023-01-01 RX ADMIN — PREGABALIN 50 MG: 25 CAPSULE ORAL at 08:03

## 2023-01-01 RX ADMIN — Medication 12.5 MG: at 08:55

## 2023-01-01 RX ADMIN — QUETIAPINE FUMARATE 25 MG: 25 TABLET ORAL at 03:11

## 2023-01-01 RX ADMIN — CARBIDOPA AND LEVODOPA 1 HALF-TAB: 10; 100 TABLET ORAL at 22:00

## 2023-01-01 RX ADMIN — RAMELTEON 8 MG: 8 TABLET ORAL at 02:41

## 2023-01-01 RX ADMIN — ACETAMINOPHEN 975 MG: 325 TABLET, FILM COATED ORAL at 20:04

## 2023-01-01 RX ADMIN — QUETIAPINE FUMARATE 25 MG: 25 TABLET ORAL at 00:29

## 2023-01-01 RX ADMIN — Medication 12.5 MG: at 08:32

## 2023-01-01 RX ADMIN — SENNOSIDES AND DOCUSATE SODIUM 1 TABLET: 8.6; 5 TABLET ORAL at 20:07

## 2023-01-01 RX ADMIN — Medication 12.5 MG: at 14:28

## 2023-01-01 RX ADMIN — Medication 12.5 MG: at 14:45

## 2023-01-01 RX ADMIN — ACETAMINOPHEN 975 MG: 325 TABLET, FILM COATED ORAL at 21:44

## 2023-01-01 RX ADMIN — AMLODIPINE BESYLATE 2.5 MG: 2.5 TABLET ORAL at 21:13

## 2023-01-01 RX ADMIN — QUETIAPINE FUMARATE 50 MG: 25 TABLET ORAL at 20:36

## 2023-01-01 RX ADMIN — Medication 1 TABLET: at 10:23

## 2023-01-01 RX ADMIN — Medication 12.5 MG: at 14:46

## 2023-01-01 RX ADMIN — CARBIDOPA AND LEVODOPA 1 HALF-TAB: 10; 100 TABLET ORAL at 20:34

## 2023-01-01 RX ADMIN — PREGABALIN 50 MG: 25 CAPSULE ORAL at 20:51

## 2023-01-01 RX ADMIN — AMLODIPINE BESYLATE 2.5 MG: 2.5 TABLET ORAL at 08:45

## 2023-01-01 RX ADMIN — CARBIDOPA AND LEVODOPA 1 HALF-TAB: 10; 100 TABLET ORAL at 15:53

## 2023-01-01 RX ADMIN — ACETAMINOPHEN 975 MG: 325 TABLET, FILM COATED ORAL at 00:19

## 2023-01-01 RX ADMIN — DORZOLAMIDE HYDROCHLORIDE AND TIMOLOL MALEATE 1 DROP: 22.3; 6.8 SOLUTION/ DROPS OPHTHALMIC at 09:42

## 2023-01-01 RX ADMIN — PREGABALIN 50 MG: 25 CAPSULE ORAL at 08:56

## 2023-01-01 RX ADMIN — RAMELTEON 4 MG: 8 TABLET, FILM COATED ORAL at 20:32

## 2023-01-01 RX ADMIN — CARBIDOPA AND LEVODOPA 1 HALF-TAB: 10; 100 TABLET ORAL at 22:57

## 2023-01-01 RX ADMIN — HYDROMORPHONE HYDROCHLORIDE 1 MG: 2 TABLET ORAL at 13:06

## 2023-01-01 RX ADMIN — POLYETHYLENE GLYCOL 3350 17 G: 17 POWDER, FOR SOLUTION ORAL at 08:12

## 2023-01-01 RX ADMIN — QUETIAPINE FUMARATE 12.5 MG: 25 TABLET ORAL at 00:35

## 2023-01-01 RX ADMIN — LIDOCAINE 1 PATCH: 246 PATCH TOPICAL at 20:29

## 2023-01-01 RX ADMIN — SENNOSIDES AND DOCUSATE SODIUM 1 TABLET: 8.6; 5 TABLET ORAL at 20:31

## 2023-01-01 RX ADMIN — OLANZAPINE 2.5 MG: 5 TABLET, ORALLY DISINTEGRATING ORAL at 01:52

## 2023-01-01 RX ADMIN — QUETIAPINE FUMARATE 12.5 MG: 25 TABLET ORAL at 11:56

## 2023-01-01 RX ADMIN — AMLODIPINE BESYLATE 2.5 MG: 2.5 TABLET ORAL at 21:51

## 2023-01-01 RX ADMIN — CARBIDOPA AND LEVODOPA 1 HALF-TAB: 10; 100 TABLET ORAL at 09:02

## 2023-01-01 RX ADMIN — QUETIAPINE FUMARATE 25 MG: 25 TABLET ORAL at 10:28

## 2023-01-01 RX ADMIN — Medication 12.5 MG: at 15:09

## 2023-01-01 RX ADMIN — QUETIAPINE FUMARATE 25 MG: 25 TABLET ORAL at 09:00

## 2023-01-01 RX ADMIN — POLYETHYLENE GLYCOL 3350 17 G: 17 POWDER, FOR SOLUTION ORAL at 21:44

## 2023-01-01 RX ADMIN — SENNOSIDES AND DOCUSATE SODIUM 1 TABLET: 8.6; 5 TABLET ORAL at 08:32

## 2023-01-01 RX ADMIN — SENNOSIDES AND DOCUSATE SODIUM 1 TABLET: 8.6; 5 TABLET ORAL at 09:10

## 2023-01-01 RX ADMIN — POLYETHYLENE GLYCOL 3350 17 G: 17 POWDER, FOR SOLUTION ORAL at 10:03

## 2023-01-01 RX ADMIN — POLYETHYLENE GLYCOL 3350 17 G: 17 POWDER, FOR SOLUTION ORAL at 20:53

## 2023-01-01 RX ADMIN — LORAZEPAM 1 MG: 2 INJECTION INTRAMUSCULAR; INTRAVENOUS at 16:36

## 2023-01-01 RX ADMIN — SENNOSIDES AND DOCUSATE SODIUM 1 TABLET: 8.6; 5 TABLET ORAL at 21:47

## 2023-01-01 RX ADMIN — ENOXAPARIN SODIUM 40 MG: 40 INJECTION SUBCUTANEOUS at 17:22

## 2023-01-01 RX ADMIN — QUETIAPINE FUMARATE 25 MG: 25 TABLET ORAL at 16:36

## 2023-01-01 RX ADMIN — LATANOPROST 1 DROP: 50 SOLUTION OPHTHALMIC at 21:27

## 2023-01-01 RX ADMIN — QUETIAPINE FUMARATE 50 MG: 25 TABLET ORAL at 20:08

## 2023-01-01 RX ADMIN — ENOXAPARIN SODIUM 40 MG: 40 INJECTION SUBCUTANEOUS at 18:09

## 2023-01-01 RX ADMIN — AMLODIPINE BESYLATE 5 MG: 5 TABLET ORAL at 10:22

## 2023-01-01 RX ADMIN — Medication 12.5 MG: at 09:32

## 2023-01-01 RX ADMIN — CARBIDOPA AND LEVODOPA 1 HALF-TAB: 10; 100 TABLET ORAL at 20:58

## 2023-01-01 RX ADMIN — PREGABALIN 50 MG: 25 CAPSULE ORAL at 20:58

## 2023-01-01 RX ADMIN — RAMELTEON 4 MG: 8 TABLET, FILM COATED ORAL at 20:06

## 2023-01-01 RX ADMIN — DORZOLAMIDE HYDROCHLORIDE AND TIMOLOL MALEATE 1 DROP: 22.3; 6.8 SOLUTION/ DROPS OPHTHALMIC at 08:30

## 2023-01-01 RX ADMIN — QUETIAPINE FUMARATE 50 MG: 25 TABLET ORAL at 20:16

## 2023-01-01 RX ADMIN — QUETIAPINE FUMARATE 25 MG: 25 TABLET ORAL at 10:39

## 2023-01-01 RX ADMIN — Medication 12.5 MG: at 16:03

## 2023-01-01 RX ADMIN — QUETIAPINE FUMARATE 25 MG: 25 TABLET ORAL at 00:28

## 2023-01-01 RX ADMIN — CARBIDOPA AND LEVODOPA 1 HALF-TAB: 10; 100 TABLET ORAL at 10:05

## 2023-01-01 RX ADMIN — OLANZAPINE 2.5 MG: 5 TABLET, ORALLY DISINTEGRATING ORAL at 11:07

## 2023-01-01 RX ADMIN — LIDOCAINE 1 PATCH: 246 PATCH TOPICAL at 20:00

## 2023-01-01 RX ADMIN — CARBIDOPA AND LEVODOPA 1 HALF-TAB: 10; 100 TABLET ORAL at 22:24

## 2023-01-01 RX ADMIN — PREGABALIN 50 MG: 25 CAPSULE ORAL at 20:39

## 2023-01-01 RX ADMIN — DORZOLAMIDE HYDROCHLORIDE AND TIMOLOL MALEATE 1 DROP: 22.3; 6.8 SOLUTION/ DROPS OPHTHALMIC at 21:25

## 2023-01-01 RX ADMIN — AMLODIPINE BESYLATE 2.5 MG: 2.5 TABLET ORAL at 16:36

## 2023-01-01 RX ADMIN — TRAZODONE HYDROCHLORIDE 100 MG: 50 TABLET ORAL at 20:27

## 2023-01-01 RX ADMIN — SENNOSIDES AND DOCUSATE SODIUM 1 TABLET: 8.6; 5 TABLET ORAL at 09:35

## 2023-01-01 RX ADMIN — RAMELTEON 8 MG: 8 TABLET ORAL at 21:46

## 2023-01-01 RX ADMIN — AMLODIPINE BESYLATE 2.5 MG: 2.5 TABLET ORAL at 07:57

## 2023-01-01 RX ADMIN — AMLODIPINE BESYLATE 2.5 MG: 2.5 TABLET ORAL at 20:00

## 2023-01-01 RX ADMIN — PREGABALIN 50 MG: 25 CAPSULE ORAL at 10:24

## 2023-01-01 RX ADMIN — ENOXAPARIN SODIUM 30 MG: 30 INJECTION SUBCUTANEOUS at 16:44

## 2023-01-01 RX ADMIN — PREGABALIN 50 MG: 25 CAPSULE ORAL at 20:04

## 2023-01-01 RX ADMIN — PREGABALIN 50 MG: 25 CAPSULE ORAL at 09:58

## 2023-01-01 RX ADMIN — LIDOCAINE 1 PATCH: 246 PATCH TOPICAL at 20:42

## 2023-01-01 RX ADMIN — CARBIDOPA AND LEVODOPA 1 HALF-TAB: 10; 100 TABLET ORAL at 21:37

## 2023-01-01 RX ADMIN — RAMELTEON 8 MG: 8 TABLET ORAL at 20:36

## 2023-01-01 RX ADMIN — AMLODIPINE BESYLATE 7.5 MG: 5 TABLET ORAL at 09:16

## 2023-01-01 RX ADMIN — DORZOLAMIDE HYDROCHLORIDE AND TIMOLOL MALEATE 1 DROP: 22.3; 6.8 SOLUTION/ DROPS OPHTHALMIC at 20:15

## 2023-01-01 RX ADMIN — Medication 12.5 MG: at 14:09

## 2023-01-01 RX ADMIN — LATANOPROST 1 DROP: 50 SOLUTION OPHTHALMIC at 20:03

## 2023-01-01 RX ADMIN — SENNOSIDES AND DOCUSATE SODIUM 2 TABLET: 8.6; 5 TABLET ORAL at 21:20

## 2023-01-01 RX ADMIN — Medication 12.5 MG: at 09:10

## 2023-01-01 RX ADMIN — CARBIDOPA AND LEVODOPA 1 HALF-TAB: 10; 100 TABLET ORAL at 10:56

## 2023-01-01 RX ADMIN — POLYETHYLENE GLYCOL 3350 17 G: 17 POWDER, FOR SOLUTION ORAL at 09:49

## 2023-01-01 RX ADMIN — QUETIAPINE FUMARATE 50 MG: 25 TABLET ORAL at 21:45

## 2023-01-01 RX ADMIN — CARBIDOPA AND LEVODOPA 1 HALF-TAB: 10; 100 TABLET ORAL at 14:35

## 2023-01-01 RX ADMIN — RAMELTEON 4 MG: 8 TABLET, FILM COATED ORAL at 21:32

## 2023-01-01 RX ADMIN — POLYETHYLENE GLYCOL 3350 17 G: 17 POWDER, FOR SOLUTION ORAL at 08:06

## 2023-01-01 RX ADMIN — SENNOSIDES AND DOCUSATE SODIUM 1 TABLET: 8.6; 5 TABLET ORAL at 20:37

## 2023-01-01 RX ADMIN — CARBIDOPA AND LEVODOPA 1 HALF-TAB: 10; 100 TABLET ORAL at 14:19

## 2023-01-01 RX ADMIN — Medication 12.5 MG: at 08:01

## 2023-01-01 RX ADMIN — ACETAMINOPHEN 975 MG: 325 TABLET, FILM COATED ORAL at 15:48

## 2023-01-01 RX ADMIN — ACETAMINOPHEN 975 MG: 325 TABLET, FILM COATED ORAL at 20:50

## 2023-01-01 RX ADMIN — Medication 1 TABLET: at 10:56

## 2023-01-01 RX ADMIN — RAMELTEON 8 MG: 8 TABLET ORAL at 21:49

## 2023-01-01 RX ADMIN — PREGABALIN 50 MG: 25 CAPSULE ORAL at 20:03

## 2023-01-01 RX ADMIN — Medication 12.5 MG: at 15:26

## 2023-01-01 RX ADMIN — TRAZODONE HYDROCHLORIDE 100 MG: 50 TABLET ORAL at 20:35

## 2023-01-01 RX ADMIN — LIDOCAINE 1 PATCH: 246 PATCH TOPICAL at 20:56

## 2023-01-01 RX ADMIN — LATANOPROST 1 DROP: 50 SOLUTION OPHTHALMIC at 20:11

## 2023-01-01 RX ADMIN — PREGABALIN 50 MG: 25 CAPSULE ORAL at 20:16

## 2023-01-01 RX ADMIN — Medication 1 TABLET: at 10:08

## 2023-01-01 RX ADMIN — ACETAMINOPHEN 975 MG: 325 TABLET ORAL at 09:41

## 2023-01-01 RX ADMIN — Medication 12.5 MG: at 15:48

## 2023-01-01 RX ADMIN — SENNOSIDES AND DOCUSATE SODIUM 1 TABLET: 8.6; 5 TABLET ORAL at 21:03

## 2023-01-01 RX ADMIN — DORZOLAMIDE HYDROCHLORIDE AND TIMOLOL MALEATE 1 DROP: 22.3; 6.8 SOLUTION/ DROPS OPHTHALMIC at 08:55

## 2023-01-01 RX ADMIN — QUETIAPINE FUMARATE 25 MG: 25 TABLET ORAL at 04:56

## 2023-01-01 RX ADMIN — SENNOSIDES AND DOCUSATE SODIUM 1 TABLET: 8.6; 5 TABLET ORAL at 22:02

## 2023-01-01 RX ADMIN — QUETIAPINE FUMARATE 12.5 MG: 25 TABLET ORAL at 19:39

## 2023-01-01 RX ADMIN — QUETIAPINE FUMARATE 25 MG: 25 TABLET ORAL at 03:20

## 2023-01-01 RX ADMIN — ACETAMINOPHEN 975 MG: 325 TABLET, FILM COATED ORAL at 09:26

## 2023-01-01 RX ADMIN — Medication 12.5 MG: at 14:00

## 2023-01-01 RX ADMIN — CARBIDOPA AND LEVODOPA 1 HALF-TAB: 10; 100 TABLET ORAL at 20:36

## 2023-01-01 RX ADMIN — PREGABALIN 50 MG: 25 CAPSULE ORAL at 07:46

## 2023-01-01 RX ADMIN — LATANOPROST 1 DROP: 50 SOLUTION OPHTHALMIC at 20:30

## 2023-01-01 RX ADMIN — Medication 12.5 MG: at 11:10

## 2023-01-01 RX ADMIN — QUETIAPINE FUMARATE 25 MG: 25 TABLET ORAL at 15:05

## 2023-01-01 RX ADMIN — OLANZAPINE 2.5 MG: 5 TABLET, ORALLY DISINTEGRATING ORAL at 23:08

## 2023-01-01 RX ADMIN — SENNOSIDES AND DOCUSATE SODIUM 1 TABLET: 8.6; 5 TABLET ORAL at 10:08

## 2023-01-01 RX ADMIN — QUETIAPINE FUMARATE 50 MG: 25 TABLET ORAL at 20:03

## 2023-01-01 RX ADMIN — DORZOLAMIDE HYDROCHLORIDE AND TIMOLOL MALEATE 1 DROP: 22.3; 6.8 SOLUTION/ DROPS OPHTHALMIC at 08:31

## 2023-01-01 RX ADMIN — Medication 12.5 MG: at 14:21

## 2023-01-01 RX ADMIN — HYDROMORPHONE HYDROCHLORIDE 1 MG: 2 TABLET ORAL at 00:00

## 2023-01-01 RX ADMIN — RAMELTEON 8 MG: 8 TABLET ORAL at 20:03

## 2023-01-01 RX ADMIN — Medication 1 TABLET: at 10:04

## 2023-01-01 RX ADMIN — SENNOSIDES AND DOCUSATE SODIUM 1 TABLET: 8.6; 5 TABLET ORAL at 08:03

## 2023-01-01 RX ADMIN — LATANOPROST 1 DROP: 50 SOLUTION OPHTHALMIC at 21:38

## 2023-01-01 RX ADMIN — CARBIDOPA AND LEVODOPA 1 HALF-TAB: 10; 100 TABLET ORAL at 08:39

## 2023-01-01 RX ADMIN — QUETIAPINE FUMARATE 50 MG: 25 TABLET ORAL at 19:15

## 2023-01-01 RX ADMIN — CARBIDOPA AND LEVODOPA 1 HALF-TAB: 10; 100 TABLET ORAL at 09:51

## 2023-01-01 RX ADMIN — ENOXAPARIN SODIUM 40 MG: 40 INJECTION SUBCUTANEOUS at 09:51

## 2023-01-01 RX ADMIN — Medication 12.5 MG: at 14:11

## 2023-01-01 RX ADMIN — ENOXAPARIN SODIUM 40 MG: 40 INJECTION SUBCUTANEOUS at 18:40

## 2023-01-01 RX ADMIN — AMLODIPINE BESYLATE 2.5 MG: 2.5 TABLET ORAL at 08:00

## 2023-01-01 RX ADMIN — CARBIDOPA AND LEVODOPA 1 HALF-TAB: 10; 100 TABLET ORAL at 09:56

## 2023-01-01 ASSESSMENT — ACTIVITIES OF DAILY LIVING (ADL)
ADLS_ACUITY_SCORE: 52
ADLS_ACUITY_SCORE: 55
ADLS_ACUITY_SCORE: 54
ADLS_ACUITY_SCORE: 55
ADLS_ACUITY_SCORE: 58
ADLS_ACUITY_SCORE: 58
ADLS_ACUITY_SCORE: 50
ADLS_ACUITY_SCORE: 58
ADLS_ACUITY_SCORE: 55
ADLS_ACUITY_SCORE: 35
ADLS_ACUITY_SCORE: 55
ADLS_ACUITY_SCORE: 57
ADLS_ACUITY_SCORE: 35
ADLS_ACUITY_SCORE: 55
ADLS_ACUITY_SCORE: 48
ADLS_ACUITY_SCORE: 52
ADLS_ACUITY_SCORE: 58
ADLS_ACUITY_SCORE: 45
ADLS_ACUITY_SCORE: 55
ADLS_ACUITY_SCORE: 54
ADLS_ACUITY_SCORE: 50
ADLS_ACUITY_SCORE: 55
ADLS_ACUITY_SCORE: 54
ADLS_ACUITY_SCORE: 57
ADLS_ACUITY_SCORE: 54
ADLS_ACUITY_SCORE: 54
ADLS_ACUITY_SCORE: 58
ADLS_ACUITY_SCORE: 48
ADLS_ACUITY_SCORE: 50
ADLS_ACUITY_SCORE: 51
ADLS_ACUITY_SCORE: 54
ADLS_ACUITY_SCORE: 58
ADLS_ACUITY_SCORE: 55
ADLS_ACUITY_SCORE: 58
ADLS_ACUITY_SCORE: 53
ADLS_ACUITY_SCORE: 55
ADLS_ACUITY_SCORE: 51
ADLS_ACUITY_SCORE: 54
ADLS_ACUITY_SCORE: 48
ADLS_ACUITY_SCORE: 58
ADLS_ACUITY_SCORE: 52
ADLS_ACUITY_SCORE: 55
ADLS_ACUITY_SCORE: 55
ADLS_ACUITY_SCORE: 46
ADLS_ACUITY_SCORE: 55
ADLS_ACUITY_SCORE: 57
ADLS_ACUITY_SCORE: 56
ADLS_ACUITY_SCORE: 46
ADLS_ACUITY_SCORE: 54
ADLS_ACUITY_SCORE: 55
DOING_ERRANDS_INDEPENDENTLY_DIFFICULTY: YES
ADLS_ACUITY_SCORE: 56
ADLS_ACUITY_SCORE: 56
ADLS_ACUITY_SCORE: 55
ADLS_ACUITY_SCORE: 53
DIFFICULTY_EATING/SWALLOWING: NO
ADLS_ACUITY_SCORE: 54
ADLS_ACUITY_SCORE: 55
ADLS_ACUITY_SCORE: 54
ADLS_ACUITY_SCORE: 55
ADLS_ACUITY_SCORE: 51
ADLS_ACUITY_SCORE: 52
ADLS_ACUITY_SCORE: 54
ADLS_ACUITY_SCORE: 54
ADLS_ACUITY_SCORE: 53
ADLS_ACUITY_SCORE: 46
ADLS_ACUITY_SCORE: 57
ADLS_ACUITY_SCORE: 50
ADLS_ACUITY_SCORE: 48
ADLS_ACUITY_SCORE: 52
ADLS_ACUITY_SCORE: 55
ADLS_ACUITY_SCORE: 55
ADLS_ACUITY_SCORE: 54
ADLS_ACUITY_SCORE: 46
ADLS_ACUITY_SCORE: 55
ADLS_ACUITY_SCORE: 51
ADLS_ACUITY_SCORE: 55
ADLS_ACUITY_SCORE: 55
ADLS_ACUITY_SCORE: 53
WEAR_GLASSES_OR_BLIND: NO
ADLS_ACUITY_SCORE: 57
ADLS_ACUITY_SCORE: 59
ADLS_ACUITY_SCORE: 55
ADLS_ACUITY_SCORE: 48
ADLS_ACUITY_SCORE: 50
ADLS_ACUITY_SCORE: 52
ADLS_ACUITY_SCORE: 55
ADLS_ACUITY_SCORE: 55
ADLS_ACUITY_SCORE: 48
ADLS_ACUITY_SCORE: 55
ADLS_ACUITY_SCORE: 58
ADLS_ACUITY_SCORE: 52
ADLS_ACUITY_SCORE: 50
ADLS_ACUITY_SCORE: 53
ADLS_ACUITY_SCORE: 58
ADLS_ACUITY_SCORE: 56
ADLS_ACUITY_SCORE: 50
ADLS_ACUITY_SCORE: 52
ADLS_ACUITY_SCORE: 50
ADLS_ACUITY_SCORE: 54
ADLS_ACUITY_SCORE: 45
ADLS_ACUITY_SCORE: 50
ADLS_ACUITY_SCORE: 48
ADLS_ACUITY_SCORE: 53
ADLS_ACUITY_SCORE: 55
ADLS_ACUITY_SCORE: 54
ADLS_ACUITY_SCORE: 55
ADLS_ACUITY_SCORE: 49
ADLS_ACUITY_SCORE: 55
ADLS_ACUITY_SCORE: 52
ADLS_ACUITY_SCORE: 54
ADLS_ACUITY_SCORE: 50
ADLS_ACUITY_SCORE: 57
ADLS_ACUITY_SCORE: 52
ADLS_ACUITY_SCORE: 50
ADLS_ACUITY_SCORE: 57
ADLS_ACUITY_SCORE: 57
ADLS_ACUITY_SCORE: 55
ADLS_ACUITY_SCORE: 57
ADLS_ACUITY_SCORE: 52
ADLS_ACUITY_SCORE: 56
ADLS_ACUITY_SCORE: 45
ADLS_ACUITY_SCORE: 55
ADLS_ACUITY_SCORE: 50
ADLS_ACUITY_SCORE: 52
ADLS_ACUITY_SCORE: 55
ADLS_ACUITY_SCORE: 55
ADLS_ACUITY_SCORE: 56
ADLS_ACUITY_SCORE: 50
ADLS_ACUITY_SCORE: 52
ADLS_ACUITY_SCORE: 51
ADLS_ACUITY_SCORE: 56
ADLS_ACUITY_SCORE: 55
ADLS_ACUITY_SCORE: 56
ADLS_ACUITY_SCORE: 54
ADLS_ACUITY_SCORE: 52
ADLS_ACUITY_SCORE: 50
ADLS_ACUITY_SCORE: 50
ADLS_ACUITY_SCORE: 46
TOILETING_ASSISTANCE: TOILETING DIFFICULTY, ASSISTANCE 1 PERSON
ADLS_ACUITY_SCORE: 50
ADLS_ACUITY_SCORE: 57
ADLS_ACUITY_SCORE: 56
ADLS_ACUITY_SCORE: 55
ADLS_ACUITY_SCORE: 57
ADLS_ACUITY_SCORE: 54
ADLS_ACUITY_SCORE: 54
ADLS_ACUITY_SCORE: 55
ADLS_ACUITY_SCORE: 50
ADLS_ACUITY_SCORE: 52
ADLS_ACUITY_SCORE: 51
ADLS_ACUITY_SCORE: 54
ADLS_ACUITY_SCORE: 54
ADLS_ACUITY_SCORE: 55
ADLS_ACUITY_SCORE: 57
ADLS_ACUITY_SCORE: 50
ADLS_ACUITY_SCORE: 57
ADLS_ACUITY_SCORE: 55
ADLS_ACUITY_SCORE: 52
ADLS_ACUITY_SCORE: 54
ADLS_ACUITY_SCORE: 52
ADLS_ACUITY_SCORE: 52
ADLS_ACUITY_SCORE: 53
ADLS_ACUITY_SCORE: 49
ADLS_ACUITY_SCORE: 48
ADLS_ACUITY_SCORE: 55
ADLS_ACUITY_SCORE: 46
ADLS_ACUITY_SCORE: 50
ADLS_ACUITY_SCORE: 55
ADLS_ACUITY_SCORE: 49
ADLS_ACUITY_SCORE: 58
ADLS_ACUITY_SCORE: 52
ADLS_ACUITY_SCORE: 57
DRESSING/BATHING_DIFFICULTY: YES
ADLS_ACUITY_SCORE: 55
ADLS_ACUITY_SCORE: 49
ADLS_ACUITY_SCORE: 53
ADLS_ACUITY_SCORE: 55
ADLS_ACUITY_SCORE: 57
ADLS_ACUITY_SCORE: 55
ADLS_ACUITY_SCORE: 52
ADLS_ACUITY_SCORE: 48
ADLS_ACUITY_SCORE: 50
ADLS_ACUITY_SCORE: 56
ADLS_ACUITY_SCORE: 52
ADLS_ACUITY_SCORE: 55
ADLS_ACUITY_SCORE: 54
ADLS_ACUITY_SCORE: 55
ADLS_ACUITY_SCORE: 48
ADLS_ACUITY_SCORE: 56
ADLS_ACUITY_SCORE: 52
ADLS_ACUITY_SCORE: 53
ADLS_ACUITY_SCORE: 54
ADLS_ACUITY_SCORE: 51
ADLS_ACUITY_SCORE: 46
ADLS_ACUITY_SCORE: 54
ADLS_ACUITY_SCORE: 58
ADLS_ACUITY_SCORE: 45
ADLS_ACUITY_SCORE: 56
ADLS_ACUITY_SCORE: 55
ADLS_ACUITY_SCORE: 35
ADLS_ACUITY_SCORE: 52
ADLS_ACUITY_SCORE: 48
ADLS_ACUITY_SCORE: 50
ADLS_ACUITY_SCORE: 56
ADLS_ACUITY_SCORE: 45
ADLS_ACUITY_SCORE: 55
ADLS_ACUITY_SCORE: 57
ADLS_ACUITY_SCORE: 35
ADLS_ACUITY_SCORE: 56
ADLS_ACUITY_SCORE: 55
ADLS_ACUITY_SCORE: 55
ADLS_ACUITY_SCORE: 57
ADLS_ACUITY_SCORE: 55
ADLS_ACUITY_SCORE: 50
ADLS_ACUITY_SCORE: 56
ADLS_ACUITY_SCORE: 57
ADLS_ACUITY_SCORE: 53
ADLS_ACUITY_SCORE: 51
ADLS_ACUITY_SCORE: 53
ADLS_ACUITY_SCORE: 52
ADLS_ACUITY_SCORE: 50
ADLS_ACUITY_SCORE: 54
ADLS_ACUITY_SCORE: 50
ADLS_ACUITY_SCORE: 45
ADLS_ACUITY_SCORE: 58
ADLS_ACUITY_SCORE: 45
ADLS_ACUITY_SCORE: 58
ADLS_ACUITY_SCORE: 56
ADLS_ACUITY_SCORE: 56
ADLS_ACUITY_SCORE: 55
ADLS_ACUITY_SCORE: 35
ADLS_ACUITY_SCORE: 55
ADLS_ACUITY_SCORE: 50
ADLS_ACUITY_SCORE: 52
ADLS_ACUITY_SCORE: 53
ADLS_ACUITY_SCORE: 52
ADLS_ACUITY_SCORE: 48
ADLS_ACUITY_SCORE: 54
ADLS_ACUITY_SCORE: 55
ADLS_ACUITY_SCORE: 53
ADLS_ACUITY_SCORE: 52
ADLS_ACUITY_SCORE: 55
ADLS_ACUITY_SCORE: 55
ADLS_ACUITY_SCORE: 50
ADLS_ACUITY_SCORE: 55
ADLS_ACUITY_SCORE: 55
ADLS_ACUITY_SCORE: 51
ADLS_ACUITY_SCORE: 50
ADLS_ACUITY_SCORE: 56
ADLS_ACUITY_SCORE: 46
ADLS_ACUITY_SCORE: 56
ADLS_ACUITY_SCORE: 35
ADLS_ACUITY_SCORE: 50
ADLS_ACUITY_SCORE: 48
ADLS_ACUITY_SCORE: 46
ADLS_ACUITY_SCORE: 45
ADLS_ACUITY_SCORE: 55
ADLS_ACUITY_SCORE: 59
ADLS_ACUITY_SCORE: 55
ADLS_ACUITY_SCORE: 54
CONCENTRATING,_REMEMBERING_OR_MAKING_DECISIONS_DIFFICULTY: YES
ADLS_ACUITY_SCORE: 57
ADLS_ACUITY_SCORE: 59
ADLS_ACUITY_SCORE: 56
ADLS_ACUITY_SCORE: 54
ADLS_ACUITY_SCORE: 51
ADLS_ACUITY_SCORE: 58
ADLS_ACUITY_SCORE: 55
ADLS_ACUITY_SCORE: 55
ADLS_ACUITY_SCORE: 49
ADLS_ACUITY_SCORE: 53
ADLS_ACUITY_SCORE: 55
ADLS_ACUITY_SCORE: 54
ADLS_ACUITY_SCORE: 57
ADLS_ACUITY_SCORE: 54
ADLS_ACUITY_SCORE: 55
ADLS_ACUITY_SCORE: 58
ADLS_ACUITY_SCORE: 53
ADLS_ACUITY_SCORE: 57
ADLS_ACUITY_SCORE: 55
ADLS_ACUITY_SCORE: 50
ADLS_ACUITY_SCORE: 53
ADLS_ACUITY_SCORE: 55
ADLS_ACUITY_SCORE: 48
ADLS_ACUITY_SCORE: 54
ADLS_ACUITY_SCORE: 45
ADLS_ACUITY_SCORE: 55
ADLS_ACUITY_SCORE: 55
ADLS_ACUITY_SCORE: 41
ADLS_ACUITY_SCORE: 51
ADLS_ACUITY_SCORE: 54
ADLS_ACUITY_SCORE: 55
ADLS_ACUITY_SCORE: 51
ADLS_ACUITY_SCORE: 55
ADLS_ACUITY_SCORE: 52
ADLS_ACUITY_SCORE: 51
ADLS_ACUITY_SCORE: 57
ADLS_ACUITY_SCORE: 55
ADLS_ACUITY_SCORE: 57
ADLS_ACUITY_SCORE: 51
ADLS_ACUITY_SCORE: 53
ADLS_ACUITY_SCORE: 57
ADLS_ACUITY_SCORE: 53
ADLS_ACUITY_SCORE: 50
ADLS_ACUITY_SCORE: 55
WALKING_OR_CLIMBING_STAIRS: AMBULATION DIFFICULTY, ASSISTANCE 1 PERSON
ADLS_ACUITY_SCORE: 56
ADLS_ACUITY_SCORE: 55
ADLS_ACUITY_SCORE: 45
ADLS_ACUITY_SCORE: 55
ADLS_ACUITY_SCORE: 50
ADLS_ACUITY_SCORE: 57
ADLS_ACUITY_SCORE: 55
ADLS_ACUITY_SCORE: 58
ADLS_ACUITY_SCORE: 51
ADLS_ACUITY_SCORE: 35
ADLS_ACUITY_SCORE: 58
ADLS_ACUITY_SCORE: 54
ADLS_ACUITY_SCORE: 48
TOILETING_ISSUES: YES
ADLS_ACUITY_SCORE: 57
ADLS_ACUITY_SCORE: 55
ADLS_ACUITY_SCORE: 56
ADLS_ACUITY_SCORE: 54
ADLS_ACUITY_SCORE: 52
ADLS_ACUITY_SCORE: 55
ADLS_ACUITY_SCORE: 55
ADLS_ACUITY_SCORE: 52
ADLS_ACUITY_SCORE: 55
ADLS_ACUITY_SCORE: 51
ADLS_ACUITY_SCORE: 52
ADLS_ACUITY_SCORE: 56
ADLS_ACUITY_SCORE: 55
ADLS_ACUITY_SCORE: 52
ADLS_ACUITY_SCORE: 57
ADLS_ACUITY_SCORE: 54
ADLS_ACUITY_SCORE: 54
ADLS_ACUITY_SCORE: 55
ADLS_ACUITY_SCORE: 57
ADLS_ACUITY_SCORE: 55
ADLS_ACUITY_SCORE: 53
ADLS_ACUITY_SCORE: 55
ADLS_ACUITY_SCORE: 57
ADLS_ACUITY_SCORE: 56
ADLS_ACUITY_SCORE: 55
ADLS_ACUITY_SCORE: 52
ADLS_ACUITY_SCORE: 55
ADLS_ACUITY_SCORE: 57
ADLS_ACUITY_SCORE: 50
ADLS_ACUITY_SCORE: 57
ADLS_ACUITY_SCORE: 58
ADLS_ACUITY_SCORE: 50
ADLS_ACUITY_SCORE: 52
ADLS_ACUITY_SCORE: 54
ADLS_ACUITY_SCORE: 53
ADLS_ACUITY_SCORE: 55
ADLS_ACUITY_SCORE: 45
ADLS_ACUITY_SCORE: 48
ADLS_ACUITY_SCORE: 52
ADLS_ACUITY_SCORE: 55
ADLS_ACUITY_SCORE: 54
ADLS_ACUITY_SCORE: 55
ADLS_ACUITY_SCORE: 53
ADLS_ACUITY_SCORE: 58
ADLS_ACUITY_SCORE: 50
ADLS_ACUITY_SCORE: 50
ADLS_ACUITY_SCORE: 55
ADLS_ACUITY_SCORE: 46
ADLS_ACUITY_SCORE: 50
ADLS_ACUITY_SCORE: 54
ADLS_ACUITY_SCORE: 55
ADLS_ACUITY_SCORE: 50
ADLS_ACUITY_SCORE: 54
ADLS_ACUITY_SCORE: 54
ADLS_ACUITY_SCORE: 52
ADLS_ACUITY_SCORE: 55
ADLS_ACUITY_SCORE: 55
ADLS_ACUITY_SCORE: 54
ADLS_ACUITY_SCORE: 46
ADLS_ACUITY_SCORE: 57
ADLS_ACUITY_SCORE: 50
ADLS_ACUITY_SCORE: 57
ADLS_ACUITY_SCORE: 52
ADLS_ACUITY_SCORE: 55
ADLS_ACUITY_SCORE: 56
ADLS_ACUITY_SCORE: 54
ADLS_ACUITY_SCORE: 55
ADLS_ACUITY_SCORE: 50
ADLS_ACUITY_SCORE: 55
ADLS_ACUITY_SCORE: 55
ADLS_ACUITY_SCORE: 56
ADLS_ACUITY_SCORE: 50
ADLS_ACUITY_SCORE: 50
ADLS_ACUITY_SCORE: 57
ADLS_ACUITY_SCORE: 45
ADLS_ACUITY_SCORE: 55
ADLS_ACUITY_SCORE: 52
ADLS_ACUITY_SCORE: 56
ADLS_ACUITY_SCORE: 54
ADLS_ACUITY_SCORE: 45
ADLS_ACUITY_SCORE: 53
ADLS_ACUITY_SCORE: 48
ADLS_ACUITY_SCORE: 50
ADLS_ACUITY_SCORE: 55
ADLS_ACUITY_SCORE: 55
ADLS_ACUITY_SCORE: 57
ADLS_ACUITY_SCORE: 56
ADLS_ACUITY_SCORE: 57
ADLS_ACUITY_SCORE: 53
ADLS_ACUITY_SCORE: 50
ADLS_ACUITY_SCORE: 55
ADLS_ACUITY_SCORE: 46
ADLS_ACUITY_SCORE: 55
ADLS_ACUITY_SCORE: 56
ADLS_ACUITY_SCORE: 55
ADLS_ACUITY_SCORE: 57
ADLS_ACUITY_SCORE: 55
ADLS_ACUITY_SCORE: 55
ADLS_ACUITY_SCORE: 48
ADLS_ACUITY_SCORE: 55
ADLS_ACUITY_SCORE: 52
ADLS_ACUITY_SCORE: 58
ADLS_ACUITY_SCORE: 54
ADLS_ACUITY_SCORE: 45
ADLS_ACUITY_SCORE: 53
ADLS_ACUITY_SCORE: 58
ADLS_ACUITY_SCORE: 55
ADLS_ACUITY_SCORE: 50
ADLS_ACUITY_SCORE: 56
ADLS_ACUITY_SCORE: 52
ADLS_ACUITY_SCORE: 35
ADLS_ACUITY_SCORE: 50
ADLS_ACUITY_SCORE: 48
ADLS_ACUITY_SCORE: 57
ADLS_ACUITY_SCORE: 55
ADLS_ACUITY_SCORE: 50
ADLS_ACUITY_SCORE: 50
ADLS_ACUITY_SCORE: 57
ADLS_ACUITY_SCORE: 56
ADLS_ACUITY_SCORE: 51
ADLS_ACUITY_SCORE: 54
ADLS_ACUITY_SCORE: 55
ADLS_ACUITY_SCORE: 57
ADLS_ACUITY_SCORE: 50
WALKING_OR_CLIMBING_STAIRS_DIFFICULTY: YES
ADLS_ACUITY_SCORE: 56
ADLS_ACUITY_SCORE: 55
DRESSING/BATHING: BATHING DIFFICULTY, ASSISTANCE 1 PERSON
ADLS_ACUITY_SCORE: 50
ADLS_ACUITY_SCORE: 50
ADLS_ACUITY_SCORE: 52
ADLS_ACUITY_SCORE: 56
ADLS_ACUITY_SCORE: 54
ADLS_ACUITY_SCORE: 55
ADLS_ACUITY_SCORE: 52
ADLS_ACUITY_SCORE: 56
ADLS_ACUITY_SCORE: 58
ADLS_ACUITY_SCORE: 54
ADLS_ACUITY_SCORE: 55
ADLS_ACUITY_SCORE: 52
ADLS_ACUITY_SCORE: 55
ADLS_ACUITY_SCORE: 55
ADLS_ACUITY_SCORE: 53
ADLS_ACUITY_SCORE: 53
ADLS_ACUITY_SCORE: 56
ADLS_ACUITY_SCORE: 56
FALL_HISTORY_WITHIN_LAST_SIX_MONTHS: YES
ADLS_ACUITY_SCORE: 56
ADLS_ACUITY_SCORE: 55

## 2023-09-29 PROBLEM — R45.1 AGITATION: Status: ACTIVE | Noted: 2023-01-01

## 2023-09-29 PROBLEM — S12.601A CLOSED NONDISPLACED FRACTURE OF SEVENTH CERVICAL VERTEBRA, UNSPECIFIED FRACTURE MORPHOLOGY, INITIAL ENCOUNTER (H): Status: ACTIVE | Noted: 2023-01-01

## 2023-09-29 PROBLEM — S12.691A OTHER CLOSED NONDISPLACED FRACTURE OF SEVENTH CERVICAL VERTEBRA, INITIAL ENCOUNTER (H): Status: ACTIVE | Noted: 2023-01-01

## 2023-09-29 NOTE — PROGRESS NOTES
Addneudm:    T3 fracture was reviewed with Dr. Philippe at time of note 9/29.  Recommendations continue to be cervical collar at all times. Updated hospitalist MD Dr. Marte      MRI and upright XR reviewed with Dr. Philippe and EDMD  Recommend cervical collar at all times, light activity, follow up in 6 weeks with upright XR prior. Our office will coordinate follow up.   Will place orthotics consult for kristie collar for showering as well     Dr. Philippe in agreement with plans   Our team will sign off at this time      EXAM: MR CERVICAL SPINE W/O CONTRAST  LOCATION: [facility]  DATE: 9/29/2023     INDICATION: Fall, neck pain, C7 and T1 compression deformities on CT  COMPARISON: MRI scan cervical spine 07/28/2016. CT scan of the cervical spine 02/19/2023. 09/29/2023.  TECHNIQUE: MRI Cervical Spine without IV contrast.     FINDINGS:      Superior endplate compression fracture involving the C7 vertebral body with loss of less than 15% of vertebral body height and no retropulsion of bony elements. There is subtle high signal change within the anterior/superior margin of the C7 vertebral   body on the STIR sequences with corresponding low signal on the T1-weighted sequences.     Superior endplate compression fracture with less than 5% loss of vertebral body height at T1. No abnormal signal associated with this fracture.     Superior endplate T3 compression fracture deformity with subtle high signal change within the posterior aspect of the T3 vertebral body on the STIR sequences. Loss of approximately 25% to 30% of vertebral body height. No retropulsion of bony elements.   This fracture is new compared to CT scan of 02/19/2023. The T3 level was not visualized on this CT of cervical spine performed 09/29/2023.     No abnormal signal within the cervical cord. The epidural spaces clear. Pontomedullary junctions clear. No prevertebral soft tissue swelling.        C2-C3: Tiny central disc bulge. Spinal  canal foramen are patent.      C3-C4: Loss of disc signal.   Spinal canal and left foramen are patent. Right foramen is mildly narrowed due to right-sided uncovertebral joint and facet hypertrophic change.  C4-C5: Spinal canal and foramen are patent. Loss of disc signal.      C5-C6: Mild central ventral osteophyte/disc complex. Left foramen is patent. Right foramen is narrowed due to right-sided facet and uncovertebral joint hypertrophic change.      C6-C7: Normal disc height. No herniation. Normal facets. No spinal canal or neural foraminal stenosis.      C7-T1: 2 mm of anterior degenerative subluxation of C7 on T1.                                                                      IMPRESSION:  1.  Superior endplate compression fracture of the C7 vertebrae with loss of 15% of vertebral body height. Subtle high signal change within the anterior/superior margin of the C7 vertebrae suggests that this fracture is acute/subacute in nature. No   retropulsion of bony elements. No compromise of the spinal canal.           2.  Subtle superior endplate compression fracture of the T1 vertebrae demonstrates no abnormal edema signal on the MRI scan. This fracture is chronic.           3.  Superior endplate compression fracture involving the T3 vertebrae demonstrate subtle high signal change within the posterior margin of the T3 vertebrae on the STIR sequences suggesting bone edema representing a acute/subacute fracture. Loss of 25-30%   of vertebral body height. No retropulsion of bony elements.     4.  Mild DDD change of the cervical disc spaces.      Narrative & Impression   EXAM: XR CERVICAL SPINE 2/3 VIEWS  LOCATION: [facility]  DATE: 9/29/2023     INDICATION: Upright x-ray in collar.  COMPARISON: Cervical CT 09/29/2023.                                                                      IMPRESSION: Satisfactory cervical alignment. Very mild loss of height superior endplate C7 on T1 noted on  prior CT is redemonstrated at C7, with obscuration of T1 due to overlap of adjacent structures. Degenerative changes with interspace narrowing   centered at C5-C6 as before. Lung apices show minimal scarring. AICD leads. Cervical facet joint spondylosis. Rightward convexity curvature apex T1. Appropriate appearance to the airway and cervical prevertebral soft tissues. Otherwise unremarkable.

## 2023-09-29 NOTE — CONSULTS
[facility]    Neurosurgery Consultation     Date of Admission:  9/29/2023  Date of Consult (When I saw the patient): 09/29/23    Assessment & Plan   Rosemary Farmer is a 88 year old male who was admitted on 9/29/2023. Rosemary Farmer is a 88 year old male with history of Alzheimer's currently in memory care unit presented overnight after a fall with neck pain and imaging evidence of age indeterminate fracture at C7 and T1.    Patient is not reliable history. Nursing notes he slept well overnight but has been increasingly agitated and pulling off clothing and collar. He is moving all extremities equally.     When asked, patient denies pain.     Narrative & Impression   EXAM: CT HEAD W/O CONTRAST, CT CERVICAL SPINE W/O CONTRAST  LOCATION: [facility]  DATE: 9/29/2023     INDICATION: Fall, head injury.  COMPARISON: Head and cervical spine CT 02/19/2023.  TECHNIQUE:   1) Routine CT Head without IV contrast. Multiplanar reformats. Dose reduction techniques were used.  2) Routine CT Cervical Spine without IV contrast. Multiplanar reformats. Dose reduction techniques were used.     FINDINGS:   HEAD CT:   INTRACRANIAL CONTENTS: No intracranial hemorrhage, extraaxial collection, or mass effect.  No CT evidence of acute infarct. Moderate volume loss and mild burden presumed chronic small vessel ischemia. Moderate ventriculomegaly is stable and chronic.     VISUALIZED ORBITS/SINUSES/MASTOIDS: No intraorbital abnormality. No paranasal sinus mucosal disease. No middle ear or mastoid effusion.     BONES/SOFT TISSUES: No acute abnormality.     CERVICAL SPINE CT:   VERTEBRA: Alignment is normal. Interval development of mild superior endplate compressions of C7 and T1 compared to 02/19/2023. 30% loss of height at C7 and 20% loss of height at T1. These are age-indeterminate. No additional cervical spine fracture.   Mild loss of disc space height at C5-C6 and mild multilevel facet  arthropathy.     CANAL/FORAMINA: No canal or neural foraminal stenosis.     PARASPINAL: No extraspinal abnormality. Visualized lung fields are clear.                                                                      IMPRESSION:  HEAD CT:  1.  No acute intracranial abnormality or significant change compared to the prior study.      CERVICAL SPINE CT:  1.  Mild superior endplate height loss at C7 and T1 has developed since the cervical spine CT of 02/19/2023. These are technically age-indeterminate. No retropulsion.     2.  No other fracture.             Clinical history, imaging and plans reviewed myself as well as with Dr. Philippe. Recommend upright XR in collar, CMRI for further evaluation of fracture. If old, may remove cervical collar. If new, will recommend collar x 6-12 weeks time with follow up in 6 weeks time.     I have discussed the following assessment and plan with Dr. Philippe who is in agreement with the initial plan and will follow up with further consultation recommendations.    Josefina White PA-C  [facility] [place] [address]    Tel [phone]  Pager [phone]      Code Status    No Order    Reason for Consult   Reason for consult: I was asked by Dr. Avelar to evaluate this patient for cervical fracture.    Primary Care Physician   SUSU PARRISH    Chief Complaint   Fall, neck pain     History is obtained from the electronic health record and emergency department physician    History of Present Illness   Rosemary Farmer is a 88 year old male with history of Alzheimer's currently in memory care unit presented overnight after a fall with neck pain and imaging evidence of age indeterminate fracture at C7 and T1.    Patient is not reliable history. Nursing notes he slept well overnight but has been increasingly agitated and pulling off clothing and collar. He is moving all extremities equally.     When asked, patient  denies pain.     Narrative & Impression   EXAM: CT HEAD W/O CONTRAST, CT CERVICAL SPINE W/O CONTRAST  LOCATION: [facility]  DATE: 9/29/2023     INDICATION: Fall, head injury.  COMPARISON: Head and cervical spine CT 02/19/2023.  TECHNIQUE:   1) Routine CT Head without IV contrast. Multiplanar reformats. Dose reduction techniques were used.  2) Routine CT Cervical Spine without IV contrast. Multiplanar reformats. Dose reduction techniques were used.     FINDINGS:   HEAD CT:   INTRACRANIAL CONTENTS: No intracranial hemorrhage, extraaxial collection, or mass effect.  No CT evidence of acute infarct. Moderate volume loss and mild burden presumed chronic small vessel ischemia. Moderate ventriculomegaly is stable and chronic.     VISUALIZED ORBITS/SINUSES/MASTOIDS: No intraorbital abnormality. No paranasal sinus mucosal disease. No middle ear or mastoid effusion.     BONES/SOFT TISSUES: No acute abnormality.     CERVICAL SPINE CT:   VERTEBRA: Alignment is normal. Interval development of mild superior endplate compressions of C7 and T1 compared to 02/19/2023. 30% loss of height at C7 and 20% loss of height at T1. These are age-indeterminate. No additional cervical spine fracture.   Mild loss of disc space height at C5-C6 and mild multilevel facet arthropathy.     CANAL/FORAMINA: No canal or neural foraminal stenosis.     PARASPINAL: No extraspinal abnormality. Visualized lung fields are clear.                                                                      IMPRESSION:  HEAD CT:  1.  No acute intracranial abnormality or significant change compared to the prior study.      CERVICAL SPINE CT:  1.  Mild superior endplate height loss at C7 and T1 has developed since the cervical spine CT of 02/19/2023. These are technically age-indeterminate. No retropulsion.     2.  No other fracture.               Past Medical History   I have reviewed this patient's medical history and updated it with pertinent  information if needed.   Past Medical History:   Diagnosis Date    Enlarged prostate     Ulcer        Past Surgical History   I have reviewed this patient's surgical history and updated it with pertinent information if needed.  Past Surgical History:   Procedure Laterality Date    GLAUCOMA SURGERY  2006    SHOULDER SURGERY  08/23/2010    4 shoulder surgeries including 2 rotator cuff    VARICOCELECTOMY  1986    VARICOCELECTOMY  2008       Prior to Admission Medications   Prior to Admission Medications   Prescriptions Last Dose Informant Patient Reported? Taking?   GLUCOSAMINE CHONDROITIN COMPLX PO   Yes No   Sig: Take  by mouth.   Melatonin 10 MG TABS tablet   Yes No   Sig: Take 10 mg by mouth At Bedtime   apixaban ANTICOAGULANT (ELIQUIS) 5 MG tablet   Yes No   Sig: Take 5 mg by mouth 2 times daily   bisacodyl (DULCOLAX) 10 MG suppository   Yes No   Sig: Place 10 mg rectally   coenzyme Q-10 10 MG CAPS   Yes No   Sig: Take 10 mg by mouth daily   dorzolamide-timolol (COSOPT) 2-0.5 % ophthalmic solution   Yes No   Sig: Place 1 drop into the right eye 2 times daily   metoprolol tartrate (LOPRESSOR) 25 MG tablet   Yes No   Sig: Take 25 mg by mouth 2 times daily   multivitamin, therapeutic with minerals (THERA-VIT-M) TABS tablet   Yes No   Sig: Take 1 tablet by mouth   omeprazole (PRILOSEC) 20 MG DR capsule   Yes No   Sig: Take 20 mg by mouth 3 times daily (before meals)   polyethylene glycol-propylene glycol (SYSTANE ULTRA) 0.4-0.3 % SOLN ophthalmic solution   Yes No   Sig: Place 1 drop into both eyes as needed   pregabalin (LYRICA) 100 MG capsule   No No   Sig: Take 1 capsule (100 mg) by mouth 3 times daily      Facility-Administered Medications: None     Allergies   Allergies   Allergen Reactions    Dilantin [Phenytoin] Other (See Comments)     vomiting    Barbiturates GI Disturbance     Other reaction(s): GI Upset    Haldol [Haloperidol]     Memantine     Alphagan [Brimonidine Tartrate] Other (See Comments)     Eye  reddness       Social History   I have reviewed this patient's social history and updated it with pertinent information if needed. Rosemary Farmer  reports that he quit smoking about 47 years ago. His smoking use included cigarettes. He has a 7.00 pack-year smoking history. He has never used smokeless tobacco. He reports that he does not currently use alcohol. He reports that he does not use drugs.    Family History   I have reviewed this patient's family history and updated it with pertinent information if needed.   Family History   Problem Relation Age of Onset    No Known Problems Mother     Pneumonia Father     No Known Problems Maternal Grandmother     No Known Problems Maternal Grandfather     No Known Problems Paternal Grandmother     No Known Problems Paternal Grandfather     No Known Problems Sister     No Known Problems Brother     No Known Problems Maternal Half-Brother     No Known Problems Maternal Half-Sister     No Known Problems Paternal Half-Brother     No Known Problems Paternal Half-Sister     No Known Problems Daughter     No Known Problems Son     No Known Problems Maternal Aunt     No Known Problems Maternal Uncle     No Known Problems Paternal Aunt     No Known Problems Paternal Uncle     No Known Problems Cousin     No Known Problems Other     Aneurysm No family hx of     Brain Hemorrhage No family hx of     Brain Tumor No family hx of     Cancer No family hx of     Chiari Malformation No family hx of     Diabetes No family hx of     Heart Disease No family hx of     LUNG DISEASE No family hx of     Seizure Disorder No family hx of     Cerebrovascular Disease No family hx of     Depression No family hx of     Migraines No family hx of        Review of Systems    ROS: 10 point ROS neg other than the symptoms noted above in the HPI.      Physical Exam   Temp: 98  F (36.7  C) Temp src: Oral BP: (!) 148/70 Pulse: 64   Resp: 18 SpO2: 97 % O2 Device: None (Room air)    Vital Signs with Ranges  Temp:   "[98  F (36.7  C)] 98  F (36.7  C)  Pulse:  [60-79] 64  Resp:  [18] 18  BP: (116-187)/(63-84) 148/70  SpO2:  [96 %-99 %] 97 %  190 lbs 0 oz     , Blood pressure (!) 148/70, pulse 64, temperature 98  F (36.7  C), temperature source Oral, resp. rate 18, height 6' 4\" (1.93 m), weight 190 lb (86.2 kg), SpO2 97 %.  190 lbs 0 oz    NEUROLOGICAL EXAMINATION:     Awake, alert, follows very simple commands  Moving all extremities symmetrically with 5/5 motor strength   Sensation appears to be intact to light touch   Negative clonus   Collar in place     Remainder of exam deferred due to poor patient cooperation with history of Alzheimer's    Data   All new lab and imaging data was personally reviewed by me.  Narrative & Impression   EXAM: CT HEAD W/O CONTRAST, CT CERVICAL SPINE W/O CONTRAST  LOCATION: [facility]  DATE: 9/29/2023     INDICATION: Fall, head injury.  COMPARISON: Head and cervical spine CT 02/19/2023.  TECHNIQUE:   1) Routine CT Head without IV contrast. Multiplanar reformats. Dose reduction techniques were used.  2) Routine CT Cervical Spine without IV contrast. Multiplanar reformats. Dose reduction techniques were used.     FINDINGS:   HEAD CT:   INTRACRANIAL CONTENTS: No intracranial hemorrhage, extraaxial collection, or mass effect.  No CT evidence of acute infarct. Moderate volume loss and mild burden presumed chronic small vessel ischemia. Moderate ventriculomegaly is stable and chronic.     VISUALIZED ORBITS/SINUSES/MASTOIDS: No intraorbital abnormality. No paranasal sinus mucosal disease. No middle ear or mastoid effusion.     BONES/SOFT TISSUES: No acute abnormality.     CERVICAL SPINE CT:   VERTEBRA: Alignment is normal. Interval development of mild superior endplate compressions of C7 and T1 compared to 02/19/2023. 30% loss of height at C7 and 20% loss of height at T1. These are age-indeterminate. No additional cervical spine fracture.   Mild loss of disc space height at C5-C6 " and mild multilevel facet arthropathy.     CANAL/FORAMINA: No canal or neural foraminal stenosis.     PARASPINAL: No extraspinal abnormality. Visualized lung fields are clear.                                                                      IMPRESSION:  HEAD CT:  1.  No acute intracranial abnormality or significant change compared to the prior study.      CERVICAL SPINE CT:  1.  Mild superior endplate height loss at C7 and T1 has developed since the cervical spine CT of 02/19/2023. These are technically age-indeterminate. No retropulsion.     2.  No other fracture.             CBC RESULTS:   Recent Labs   Lab Test 09/29/23  0324   WBC 10.8   RBC 3.85*   HGB 11.8*   HCT 36.0*   MCV 94   MCH 30.6   MCHC 32.8   RDW 13.7        Basic Metabolic Panel:  Lab Results   Component Value Date     09/29/2023      Lab Results   Component Value Date    POTASSIUM 4.4 09/29/2023     Lab Results   Component Value Date    CHLORIDE 107 09/29/2023     Lab Results   Component Value Date    ANA 8.9 09/29/2023     Lab Results   Component Value Date    CO2 24 09/29/2023     Lab Results   Component Value Date    BUN 26.1 09/29/2023     Lab Results   Component Value Date    CR 0.81 09/29/2023     Lab Results   Component Value Date     09/29/2023     INR:  No results found for: INR

## 2023-09-29 NOTE — PROGRESS NOTES
Contacted regarding 89 yo male presenting to ER after fall and neck pain.    Cervical CT reveals age indeterminate fracture at C7 and T1.  Also note fracture at C6-C7 bridging osteophyte.    CERVICAL SPINE CT:   VERTEBRA: Alignment is normal. Interval development of mild superior endplate compressions of C7 and T1 compared to 02/19/2023. 30% loss of height at C7 and 20% loss of height at T1. These are age-indeterminate. No additional cervical spine fracture.   Mild loss of disc space height at C5-C6 and mild multilevel facet arthropathy.     CANAL/FORAMINA: No canal or neural foraminal stenosis.     PARASPINAL: No extraspinal abnormality. Visualized lung fields are clear.                                                                      IMPRESSION:  CERVICAL SPINE CT:  1.  Mild superior endplate height loss at C7 and T1 has developed since the cervical spine CT of 02/19/2023. These are technically age-indeterminate. No retropulsion.     2.  No other fracture.        IMPRESSION:  HEAD CT:  1.  No acute intracranial abnormality or significant change compared to the prior study.      CERVICAL SPINE CT:  1.  Mild superior endplate height loss at C7 and T1 has developed since the cervical spine CT of 02/19/2023. These are technically age-indeterminate. No retropulsion.     2.  No other fracture.    RECOMMENDATIONS:  Place in cervical hard collar.  MRI cervical spine without contrast to assess acuity of fractures. (Note MRI compatible pacemaker)     Upright cervical x-ray's in collar.  NS will see in consultation today.    Jazmyne Mcintosh MPAS  [facility] Neurosurgery  [address]  [address] [place] [id number]    Tel [phone]  Pager [phone]

## 2023-09-29 NOTE — ED NOTES
Pt remains very agitated. Removing collar, grabbing staff, and confused. Reorientation. Bed change. Pressure sore noted on coccyx present on arrival. Pt moved to left side.

## 2023-09-29 NOTE — ED NOTES
Bed: JNED-04  Expected date: 9/29/23  Expected time: 3:05 AM  Means of arrival: Ambulance  Comments:  Olga 88M fall

## 2023-09-29 NOTE — PROGRESS NOTES
Met with patient and spouse Aparna to review role of care management, progression of care and possible need for services at discharge, including OP services, home care, or skilled nursing care. Patient alert, disoriented and did not engaged in the conversation.     Assessed, spoke to wife Aparna and Swapna MEIER at [facility] [phone]. Pt is unable to return to assist lvg at this time due to level of agitation and his required level of care. Per Swapna RN on call, At baseline, pt is able to reposition and transfer w/an assist of one. He can get to toilet on his own with walker and feed himself. Pt lives alone at [facility]. In order for pt to return to [facility], he would need to be able to transfer and ambulate w/walker independently. Pt is easily agitated at baseline and is, per Swapna, the most challenging resident they have. He has habit of hitting and impulsive with activity and falls often. He destroyed his room last night. Swapna states that he is so restless that he may require some form of sedation and would probably need his medications adjusted. Pt is  of [employer] and loves talking about it, for distraction conversation, it's recommended to ask him about his passion.     Discussed w/Aparna, wife. Pt is leaning towards keeping pt in hospital due to his inability to understand and follow directions well. Concern of him removing his collar when he needs to keep it on. CM to follow for placement, needs PT/OT evaluation.

## 2023-09-29 NOTE — ED NOTES
Assumed care. Pt agitated, removing clothing. Toileting offered. Brief changed and removed again. Pt reoriented. MRI called re: need for MRI in ER.

## 2023-09-29 NOTE — ED NOTES
Aspen collar applied to patient by writer and MD Avelar. Pt tolerated. Good CMS pre and post-application. Ice pack applied to left shoulder as pt continues to c/o some mild pain with movement. Pt attempted to use urinal for 10 minutes, small drops of clear/yellow urine noted. Pts brief in place is clean and dry. Wife at  bedside was updated on plan for admission with probably Pacemaker interrogation and MRI planned for later today after Neurosurgery consult.

## 2023-09-29 NOTE — PROGRESS NOTES
Assessed, lives at [facility], wife present and can transport if stable, pt requires higher level of care at this time, pending PT/OT eval for TCU placement, [facility] facility is unable to provide safe care if he needs to keep neck brace in place and CM to follow for discharge needs.

## 2023-09-29 NOTE — ED TRIAGE NOTES
Pt presents to ED via EMS for evaluation of unwitness fall, found on floor. Hx of A Fib, was told is not on blood thinners, MAR states otherwise. Updated MAR not sent by [place]. ABCs intact. Gluc 113 per EMS. Hx of dementia with behaviors.      Triage Assessment       Row Name 09/29/23 9125       Triage Assessment (Adult)    Airway WDL WDL       Respiratory WDL    Respiratory WDL WDL       Skin Circulation/Temperature WDL    Skin Circulation/Temperature WDL X  Scan skin abrasion to right knuckles       Cardiac WDL    Cardiac WDL WDL       Peripheral/Neurovascular WDL    Peripheral Neurovascular WDL WDL       Cognitive/Neuro/Behavioral WDL    Cognitive/Neuro/Behavioral WDL X    Level of Consciousness intermittent confusion  Hx of dementia    Arousal Level opens eyes spontaneously    Orientation time;situation    Speech clear    Mood/Behavior calm;cooperative       Pupils (CN II)    Pupil PERRLA yes    Pupil Size Left 3 mm    Pupil Size Right 3 mm       Xander Coma Scale    Best Eye Response 4-->(E4) spontaneous    Best Motor Response 6-->(M6) obeys commands    Best Verbal Response 4-->(V4) confused  Basline    [place] Coma Scale Score 14

## 2023-09-29 NOTE — PHARMACY-ADMISSION MEDICATION HISTORY
Pharmacist Admission Medication History    Admission medication history is complete. The information provided in this note is only as accurate as the sources available at the time of the update.    Medication reconciliation/reorder completed by provider prior to medication history? No    Information Source(s): Facility ([facility]/) medication list/MAR via N/A    Pertinent Information: MAR provided by [place] [phone]    Changes made to PTA medication list:  Added: trazodone, ramelteon, carbidopa-levodopa, quetiapine, latanoprost   Deleted: Eliquis, bisacodyl, CoQ10, glucosamine, melatonin, metoprolol, multivitamin, omeprazole, Systane  Changed: pregabalin       Allergies reviewed with patient and updates made in EHR: yes    Medication History Completed By: ETIENNE ROMERO RPH 9/29/2023 4:32 PM    Prior to Admission medications    Medication Sig Last Dose Taking? Auth Provider Long Term End Date   acetaminophen (TYLENOL) 500 MG tablet Take 1,000 mg by mouth 3 times daily 9/28/2023 at 2000 Yes Unknown, Entered By History     acetaminophen (TYLENOL) 500 MG tablet Take 500-1,000 mg by mouth daily as needed for mild pain  at prn Yes Unknown, Entered By History     carbidopa-levodopa (SINEMET)  MG tablet Take 1 tablet by mouth 3 times daily 9/28/2023 at 2000 Yes Unknown, Entered By History Yes    dorzolamide-timolol (COSOPT) 2-0.5 % ophthalmic solution Place 1 drop into both eyes 2 times daily 9/28/2023 at 2000 Yes Reported, Patient     latanoprost (XALATAN) 0.005 % ophthalmic solution Place 1 drop into both eyes At Bedtime 9/28/2023 at 2000 Yes Unknown, Entered By History Yes    polyethylene glycol (MIRALAX) 17 g packet Take 1 packet by mouth 2 times daily 9/28/2023 at 2000 Yes Unknown, Entered By History     pregabalin (LYRICA) 50 MG capsule Take 50 mg by mouth 2 times daily 9/28/2023 at 2000 Yes Unknown, Entered By History No    QUEtiapine (SEROQUEL) 25 MG tablet Take 12.5 mg by mouth 2 times daily 0800 & 1500  9/28/2023 at 1500 Yes Unknown, Entered By History Yes    QUEtiapine (SEROQUEL) 25 MG tablet Take 25 mg by mouth 2 times daily as needed (restlessness/ agitation)  at prn Yes Unknown, Entered By History Yes    QUEtiapine (SEROQUEL) 50 MG tablet Take 50 mg by mouth At Bedtime 9/28/2023 at 2000 Yes Unknown, Entered By History Yes    ramelteon (ROZEREM) 8 MG tablet Take 8 mg by mouth At Bedtime 9/28/2023 at 2000 Yes Unknown, Entered By History     senna-docusate (SENOKOT-S/PERICOLACE) 8.6-50 MG tablet Take 2 tablets by mouth 2 times daily 9/28/2023 at 2000 Yes Unknown, Entered By History     traZODone (DESYREL) 100 MG tablet Take 100 mg by mouth At Bedtime 9/28/2023 at 2000 Yes Unknown, Entered By History Yes

## 2023-09-29 NOTE — ED NOTES
EMERGENCY DEPARTMENT SIGN OUT NOTE        ED COURSE AND MEDICAL DECISION MAKING  Patient was signed out to me by Dr Kumar Avelar at 7:00 AM  8:44 AM patient had significant agitation throughout his a.m. ED course.  He ripped out his IV, continue to pull and manipulate the soft collar.  He was given Ativan for mild restraint and anxiolysis.  9:30 AM Spoke to Josefina, the neurosurgery PA.  11:30 AM spoke with MRI technician, still awaiting approval of device for MRI compatibility through [employer].  Estimated time of imaging is 1230 to 1:00 PM.  This was a significant delay.  2:10 PM Spoke with neurosurgery         In brief, Rosemary Farmer is a 88 year old male who initially presented for fall, shoulder pain.      At time of sign out, disposition was pending cervical spine MRI.  Did discuss patient case with nursing staff and MRI technician, at this time plan is to have tach evaluate patient's pacemaker device to assess for compatibility with MRI imaging.  Plan will be to obtain MRI imaging of the cervical spine to definitively rule out new fracture and then likely consult with neurosurgery on final disposition MRI imaging concerning for compression fracture involving the T3 vertebrae as well as compression fracture of C7 vertebrae.  Did discuss patient case with neurosurgery who recommends cervical collar for 6 weeks and then close follow-up.  However I am concerned about patient's confusion and agitation that he will not be able to secure the collar if discharged home.  Considered admission versus TCU placement.  Spoke with the care manager and if we can find appropriate TCU placement then will consider transfer if not likely admission for symptomatic care and placement issues.  I spoke with the wife who agrees with the care plan at this time.    FINAL IMPRESSION    1. Other closed nondisplaced fracture of seventh cervical vertebra, initial encounter (H)        ED MEDS  Medications   LORazepam (ATIVAN) injection 1 mg  (has no administration in time range)   acetaminophen (TYLENOL) tablet 650 mg (650 mg Oral $Given 9/29/23 0326)   LORazepam (ATIVAN) injection 0.5 mg (0.5 mg Intravenous $Given 9/29/23 0804)   LORazepam (ATIVAN) injection 1 mg (1 mg Intramuscular $Given 9/29/23 0826)   LORazepam (ATIVAN) injection 1 mg (1 mg Intramuscular $Given 9/29/23 0852)       LAB  Labs Ordered and Resulted from Time of ED Arrival to Time of ED Departure   BASIC METABOLIC PANEL - Abnormal       Result Value    Sodium 143      Potassium 4.4      Chloride 107      Carbon Dioxide (CO2) 24      Anion Gap 12      Urea Nitrogen 26.1 (*)     Creatinine 0.81      GFR Estimate 86      Calcium 8.9      Glucose 106 (*)    CBC WITH PLATELETS AND DIFFERENTIAL - Abnormal    WBC Count 10.8      RBC Count 3.85 (*)     Hemoglobin 11.8 (*)     Hematocrit 36.0 (*)     MCV 94      MCH 30.6      MCHC 32.8      RDW 13.7      Platelet Count 285      % Neutrophils 74      % Lymphocytes 16      % Monocytes 8      % Eosinophils 1      % Basophils 1      % Immature Granulocytes 0      NRBCs per 100 WBC 0      Absolute Neutrophils 8.1      Absolute Lymphocytes 1.7      Absolute Monocytes 0.9      Absolute Eosinophils 0.1      Absolute Basophils 0.1      Absolute Immature Granulocytes 0.0      Absolute NRBCs 0.0         RADIOLOGY    Cervical spine MRI w/o contrast   Final Result   IMPRESSION:   1.  Superior endplate compression fracture of the C7 vertebrae with loss of 15% of vertebral body height. Subtle high signal change within the anterior/superior margin of the C7 vertebrae suggests that this fracture is acute/subacute in nature. No    retropulsion of bony elements. No compromise of the spinal canal.            2.  Subtle superior endplate compression fracture of the T1 vertebrae demonstrates no abnormal edema signal on the MRI scan. This fracture is chronic.            3.  Superior endplate compression fracture involving the T3 vertebrae demonstrate subtle high  signal change within the posterior margin of the T3 vertebrae on the STIR sequences suggesting bone edema representing a acute/subacute fracture. Loss of 25-30%    of vertebral body height. No retropulsion of bony elements.      4.  Mild DDD change of the cervical disc spaces.         XR Cervical Spine 2/3 Views   Final Result   IMPRESSION: Satisfactory cervical alignment. Very mild loss of height superior endplate C7 on T1 noted on prior CT is redemonstrated at C7, with obscuration of T1 due to overlap of adjacent structures. Degenerative changes with interspace narrowing    centered at C5-C6 as before. Lung apices show minimal scarring. AICD leads. Cervical facet joint spondylosis. Rightward convexity curvature apex T1. Appropriate appearance to the airway and cervical prevertebral soft tissues. Otherwise unremarkable.         XR Chest 1 View   Final Result   IMPRESSION: No change in a dual-lead cardiac pacer. There is no evidence of lead discontinuity or abandoned leads. Heart size is within normal limits given technique. There is minimal left basilar scarring versus atelectasis. Right lung is clear. No    pleural effusion or pneumothorax.      Elbow XR,  2 views, left   Final Result   IMPRESSION:    1. No visualized acute fracture or malalignment of the left elbow.   2. No left elbow joint effusion.       XR Humerus Left G/E 2 Views   Final Result   IMPRESSION: Osteopenia. Negative for fracture or dislocation. Degenerative changes at the acromioclavicular joint. Left-sided cardiac pacer noted.      Cervical spine CT w/o contrast   Final Result   IMPRESSION:   HEAD CT:   1.  No acute intracranial abnormality or significant change compared to the prior study.       CERVICAL SPINE CT:   1.  Mild superior endplate height loss at C7 and T1 has developed since the cervical spine CT of 02/19/2023. These are technically age-indeterminate. No retropulsion.      2.  No other fracture.                          Head CT w/o  contrast   Final Result   IMPRESSION:   HEAD CT:   1.  No acute intracranial abnormality or significant change compared to the prior study.       CERVICAL SPINE CT:   1.  Mild superior endplate height loss at C7 and T1 has developed since the cervical spine CT of 02/19/2023. These are technically age-indeterminate. No retropulsion.      2.  No other fracture.                          XR Cervical Spine 2/3 Views    (Results Pending)       DISCHARGE MEDS  Current Discharge Medication List          Carson Hernandez MD  [facility] EMERGENCY DEPARTMENT  [address] [place] [id number] [phone]       Carson Hernandez MD  09/29/23 7015

## 2023-09-29 NOTE — PROGRESS NOTES
"S: Pt is an 88 yom seen at [facility] ED, room 4, for delivery of a [place] collar. Referring physician is Mary. Pt was accompanied by his wife.    O: 6' 4\". 190 lbs. C7 fx. Pt was already wearing an [place] cervical collar when I arrived.    A: A [place] collar was fit and delivered to the pt that he will wear when showering to support his cervical spine. Written instructions were also provided. Donning/doffing and wear/care were discussed with the pt's wife.    P: Pt to wear device according to physician's instructions. Pt to follow-up as needed.    G: Provide cervical stability while showering.    Electronically signed by Denise Rani, Board Eligible Orthotist Prosthetist  "

## 2023-09-29 NOTE — ED NOTES
"Wife at bedside was updated about plan of care, imaging studies and pending disposition. She states that earlier on Thursday Rosemary also had a fall while she was present at the care facility. She states a \"med tech\" did and evaluation with some vitals but he was not formally evaluated by a Doctor or at a care facility. She also states that, to her knowledge, he is only taking Seroquel and Trazodone as many of his medications were stopped when he transitioned to memory care in April 2023. Pt back from imaging, he continues to complain of some mild pain to left shoulder but states, \"Its ok\". MD updated, awaiting imaging results and disposition    "

## 2023-09-29 NOTE — ED NOTES
Started on Rocephin 1g IV q24h   Will continue on admission     Wife has left for the morning. Copy of his [employer] card with information copied and placed on patient's chart near MRI checklist.

## 2023-09-29 NOTE — ED NOTES
Pt sleeping. Arouses to voice then falls back asleep. Wife at bedside. Updated on admission by MD and plan of care

## 2023-09-29 NOTE — ED NOTES
eMERGENCY dEPARTMENT PROGRESS NOTE         ED COURSE AND MEDICAL DECISION MAKING  Patient was signed out to me by Dr. Hernandez at 2:14 PM      Rosemary Farmer is a 88 year old male who initially presented for evaluation and was worked up by the overnight doctor and then Dr. Hernandez.  Patient was found to have a curved cervical spine fracture but also is agitated.  He is in assisted living but we do not think that he has enough care there so at change of shift care manager was looking for an appropriate place for the patient.    ED Course as of 09/30/23 1415   Fri Sep 29, 2023   1515 Patient was signed out at change of shift pending placement or admission.    1536 Just spoke with Karan, care manager who reports that she is going to talk to family to figure out whether this [facility]  I suspect he probably needs admission for at least PT/OT evaluation and to determine if the patient is safe to go back to his facility now that he has a new collar with a cervical injury.  I do not think that the plan was to do surgery but at his age the collar can still be quite challenging.   1649 Patient cannot go back to his facility tonight.  We will work on finding him a bed here.  He is too agitated and they do not have enough people there to help.  He has been getting some IV Ativan intermittently.  I will give him a little oral Ativan to see if it lasts a little longer.   1712 I discussed admission with Dr. Solis.  Patient will come in as a MedSurg observation.         LAB  Pertinent labs results reviewed   Labs Ordered and Resulted from Time of ED Arrival to Time of ED Departure   BASIC METABOLIC PANEL - Abnormal       Result Value    Sodium 143      Potassium 4.4      Chloride 107      Carbon Dioxide (CO2) 24      Anion Gap 12      Urea Nitrogen 26.1 (*)     Creatinine 0.81      GFR Estimate 86      Calcium 8.9      Glucose 106 (*)    CBC WITH PLATELETS AND DIFFERENTIAL -  Abnormal    WBC Count 10.8      RBC Count 3.85 (*)     Hemoglobin 11.8 (*)     Hematocrit 36.0 (*)     MCV 94      MCH 30.6      MCHC 32.8      RDW 13.7      Platelet Count 285      % Neutrophils 74      % Lymphocytes 16      % Monocytes 8      % Eosinophils 1      % Basophils 1      % Immature Granulocytes 0      NRBCs per 100 WBC 0      Absolute Neutrophils 8.1      Absolute Lymphocytes 1.7      Absolute Monocytes 0.9      Absolute Eosinophils 0.1      Absolute Basophils 0.1      Absolute Immature Granulocytes 0.0      Absolute NRBCs 0.0             RADIOLOGY    Pertinent imaging reviewed   Please see official radiology report.  Cervical spine MRI w/o contrast   Final Result   IMPRESSION:   1.  Superior endplate compression fracture of the C7 vertebrae with loss of 15% of vertebral body height. Subtle high signal change within the anterior/superior margin of the C7 vertebrae suggests that this fracture is acute/subacute in nature. No    retropulsion of bony elements. No compromise of the spinal canal.            2.  Subtle superior endplate compression fracture of the T1 vertebrae demonstrates no abnormal edema signal on the MRI scan. This fracture is chronic.            3.  Superior endplate compression fracture involving the T3 vertebrae demonstrate subtle high signal change within the posterior margin of the T3 vertebrae on the STIR sequences suggesting bone edema representing a acute/subacute fracture. Loss of 25-30%    of vertebral body height. No retropulsion of bony elements.      4.  Mild DDD change of the cervical disc spaces.         XR Cervical Spine 2/3 Views   Final Result   IMPRESSION: Satisfactory cervical alignment. Very mild loss of height superior endplate C7 on T1 noted on prior CT is redemonstrated at C7, with obscuration of T1 due to overlap of adjacent structures. Degenerative changes with interspace narrowing    centered at C5-C6 as before. Lung apices show minimal scarring. AICD leads.  Cervical facet joint spondylosis. Rightward convexity curvature apex T1. Appropriate appearance to the airway and cervical prevertebral soft tissues. Otherwise unremarkable.         XR Chest 1 View   Final Result   IMPRESSION: No change in a dual-lead cardiac pacer. There is no evidence of lead discontinuity or abandoned leads. Heart size is within normal limits given technique. There is minimal left basilar scarring versus atelectasis. Right lung is clear. No    pleural effusion or pneumothorax.      Elbow XR,  2 views, left   Final Result   IMPRESSION:    1. No visualized acute fracture or malalignment of the left elbow.   2. No left elbow joint effusion.       XR Humerus Left G/E 2 Views   Final Result   IMPRESSION: Osteopenia. Negative for fracture or dislocation. Degenerative changes at the acromioclavicular joint. Left-sided cardiac pacer noted.      Cervical spine CT w/o contrast   Final Result   IMPRESSION:   HEAD CT:   1.  No acute intracranial abnormality or significant change compared to the prior study.       CERVICAL SPINE CT:   1.  Mild superior endplate height loss at C7 and T1 has developed since the cervical spine CT of 02/19/2023. These are technically age-indeterminate. No retropulsion.      2.  No other fracture.                          Head CT w/o contrast   Final Result   IMPRESSION:   HEAD CT:   1.  No acute intracranial abnormality or significant change compared to the prior study.       CERVICAL SPINE CT:   1.  Mild superior endplate height loss at C7 and T1 has developed since the cervical spine CT of 02/19/2023. These are technically age-indeterminate. No retropulsion.      2.  No other fracture.                          XR Cervical Spine 2/3 Views    (Results Pending)       FINAL IMPRESSION    1. Other closed nondisplaced fracture of seventh cervical vertebra, initial encounter (H)    2. Closed nondisplaced fracture of seventh cervical vertebra, unspecified fracture morphology, initial  encounter (H)    3. Agitation         Lima Gilbert MD  09/30/23 2566

## 2023-09-29 NOTE — ED PROVIDER NOTES
EMERGENCY DEPARTMENT ENCOUNTER      NAME: Rosemary Farmer  AGE: 88 year old male  YOB: 1935  MRN: [id number]  EVALUATION DATE & TIME: 2023  3:12 AM    PCP: Reji Barrow    ED PROVIDER: Tomas Avelar M.D.      Chief Complaint   Patient presents with    Fall    Shoulder Pain       FINAL IMPRESSION:  1. Other closed nondisplaced fracture of seventh cervical vertebra, initial encounter (H)          ED COURSE & MEDICAL DECISION MAKIN year old male presents to the Emergency Department for evaluation of fall.  Patient had a fall at his memory care facility x2 today.  He has a history of advanced dementia.  Resides in memory care.  Later accompanied by his wife to this ED visit.  He is complaining of some neck pain and left shoulder pain but seems to be neurologically intact on arrival.  He was sent for CT imaging of the head and cervical spine as well as plain films of the left shoulder.  CT head reveals no acute intracranial process.  CT of the cervical spine reveals that C7 and T1 superior endplate compression deformities with up to 30% loss of height at C7. Age indeterminate but new since February of this year.  Case reviewed with neurosurgery.  Patient largely appears comfortable, is a difficult historian secondary to dementia.  At times will mention neck pain and seems to be tender in his cervical spine.  Again he is neurologically intact.  Discussed things with his daughter.  We placed an Aspen collar for stability.  After reviewing case with neurosurgery, we will be trying to obtain an MRI today.  This will be complicated by his pacemaker.  Patient has a history of advanced dementia but would be in agreement with selective treatments like antibiotics and IV fluids if necessary.  He is not enrolled in hospice.  As such I think it would be important to clearly delineate a plan with the extent of his injuries and neurosurgery involvement.  Discussed case with hospitalist   Orly, who did not feel admission to the hospital was warranted based on information so far.  Requested MRI completion at minimum prior to consideration of admission.  Patient will require preclearance with cardiology device techs to obtain an MRI today, clarify acuity of fractures, and plan with neurosurgery and so [facility] to follow-up on this.    3:24 AM I met with the patient, obtained history, performed an initial exam, and discussed options and plan for diagnostics and treatment here in the ED.   4:48 AM I spoke with Jazmyne Mcintosh, Neurosurgery PA.  5:20 AM Discussed patient with Dr. Villalba, [facility] medicine. Declining to admit patient at least until MRI completed.  7:00 AM Discussed case with Dr. [facility].      Medical Decision Making    History:  Supplemental history from: Documented in chart, if applicable and EMS  External Record(s) reviewed: Documented in chart, if applicable.    Work Up:  Chart documentation includes differential considered and any EKGs or imaging independently interpreted by provider, where specified.  In additional to work up documented, I considered the following work up: Documented in chart, if applicable.    External consultation:  Discussion of management with another provider: Documented in chart, if applicable    Complicating factors:  Care impacted by chronic illness: Dementia  Care affected by social determinants of health: Access to Medical Care    Disposition considerations: Admission considered, signed out to oncoming ED provider            MEDICATIONS GIVEN IN THE EMERGENCY:  Medications   LORazepam (ATIVAN) tablet 1 mg (has no administration in time range)   acetaminophen (TYLENOL) tablet 650 mg (650 mg Oral $Given 9/29/23 0326)   LORazepam (ATIVAN) injection 0.5 mg (0.5 mg Intravenous $Given 9/29/23 0804)   LORazepam (ATIVAN) injection 1 mg (1 mg Intramuscular $Given 9/29/23 0826)   LORazepam  (ATIVAN) injection 1 mg (1 mg Intramuscular $Given 9/29/23 0852)   LORazepam (ATIVAN) injection 1 mg (1 mg Intravenous $Given 9/29/23 1636)       NEW PRESCRIPTIONS STARTED AT TODAY'S ER VISIT  Current Discharge Medication List             =================================================================    HPI    Patient information was obtained from: The patient    Use of : N/A     Limited HPI due dementia    Rosemary Farmer is a 88 year old male with a pertinent history of alzheimer's disease, atrial fibrillation, gait disturbance, dementia who presents to this ED via EMS for evaluation of fall and shoulder pain.    Per EMS, the patient had an unwitnessed possible mechanical fall while standing.He did not lose consciousness. He lives in senior living. The patient is now complaining of left shoulder pain and posterior neck pain with some abrasions to his left hand knuckles.  Staff found him on the floor of his facility.    Wife later arrives and relates that he had fallen earlier in the day as well and injured his right shoulder but now appears to be moving this okay.        REVIEW OF SYSTEMS   All systems reviewed and negative except as noted in HPI.    PAST MEDICAL HISTORY:  Past Medical History:   Diagnosis Date    Enlarged prostate     Ulcer        PAST SURGICAL HISTORY:  Past Surgical History:   Procedure Laterality Date    GLAUCOMA SURGERY  2006    SHOULDER SURGERY  08/23/2010    4 shoulder surgeries including 2 rotator cuff    VARICOCELECTOMY  1986    VARICOCELECTOMY  2008           CURRENT MEDICATIONS:    Current Facility-Administered Medications   Medication    LORazepam (ATIVAN) tablet 1 mg     Current Outpatient Medications   Medication    acetaminophen (TYLENOL) 500 MG tablet    acetaminophen (TYLENOL) 500 MG tablet    carbidopa-levodopa (SINEMET)  MG tablet    dorzolamide-timolol (COSOPT) 2-0.5 % ophthalmic solution    latanoprost (XALATAN) 0.005 % ophthalmic solution    polyethylene  glycol (MIRALAX) 17 g packet    pregabalin (LYRICA) 50 MG capsule    QUEtiapine (SEROQUEL) 25 MG tablet    QUEtiapine (SEROQUEL) 25 MG tablet    QUEtiapine (SEROQUEL) 50 MG tablet    ramelteon (ROZEREM) 8 MG tablet    senna-docusate (SENOKOT-S/PERICOLACE) 8.6-50 MG tablet    traZODone (DESYREL) 100 MG tablet         ALLERGIES:  Allergies   Allergen Reactions    Dilantin [Phenytoin] Other (See Comments)     vomiting    Barbiturates GI Disturbance     Other reaction(s): GI Upset    Haldol [Haloperidol]     Memantine     Alphagan [Brimonidine Tartrate] Other (See Comments)     Eye reddness       FAMILY HISTORY:  Family History   Problem Relation Age of Onset    No Known Problems Mother     Pneumonia Father     No Known Problems Maternal Grandmother     No Known Problems Maternal Grandfather     No Known Problems Paternal Grandmother     No Known Problems Paternal Grandfather     No Known Problems Sister     No Known Problems Brother     No Known Problems Maternal Half-Brother     No Known Problems Maternal Half-Sister     No Known Problems Paternal Half-Brother     No Known Problems Paternal Half-Sister     No Known Problems Daughter     No Known Problems Son     No Known Problems Maternal Aunt     No Known Problems Maternal Uncle     No Known Problems Paternal Aunt     No Known Problems Paternal Uncle     No Known Problems Cousin     No Known Problems Other     Aneurysm No family hx of     Brain Hemorrhage No family hx of     Brain Tumor No family hx of     Cancer No family hx of     Chiari Malformation No family hx of     Diabetes No family hx of     Heart Disease No family hx of     LUNG DISEASE No family hx of     Seizure Disorder No family hx of     Cerebrovascular Disease No family hx of     Depression No family hx of     Migraines No family hx of        SOCIAL HISTORY:   Social History     Socioeconomic History    Marital status:    Tobacco Use    Smoking status: Former     Packs/day: 1.00     Years: 7.00  "    Pack years: 7.00     Types: Cigarettes     Quit date: 1976     Years since quittin.3    Smokeless tobacco: Never    Tobacco comments:     quit 40 years ago    Substance and Sexual Activity    Alcohol use: Not Currently    Drug use: No    Sexual activity: Not Currently   Other Topics Concern    Parent/sibling w/ CABG, MI or angioplasty before 65F 55M? No       VITALS:  BP (!) 234/107   Pulse 88   Temp 98  F (36.7  C) (Oral)   Resp 16   Ht 1.93 m (6' 4\")   Wt 86.2 kg (190 lb)   SpO2 99%   BMI 23.13 kg/m      PHYSICAL EXAM    Constitutional: General: Well-developed elderly male patient, sitting up in bed, no acute distress  HENT: Normocephalic, atraumatic.  There is some lower cervical spine midline tenderness without palpable defect or deformity.  Eyes: EOMI, Conjunctiva normal.  Respiratory: Breathing comfortably on room air. Lungs clear to ascultation.  Cardiovascular: Normal heart rate, Regular rhythm. No peripheral edema.  Abdomen: Soft, nontender  Musculoskeletal: Intact range of motion in all major joints  Neurologic: Awake and alert.  Oriented to self only.  Face is symmetric.  Speech is normal.  Moving all extremities and following commands with extensive prompting and grossly symmetric strength in his bilateral upper and lower extremities.  Sensation to light touch intact x4.  Psychiatric: Cooperative.  Occasional mild agitation and disorientation but redirectable.     LAB:  All pertinent labs reviewed and interpreted.  Labs Ordered and Resulted from Time of ED Arrival to Time of ED Departure   BASIC METABOLIC PANEL - Abnormal       Result Value    Sodium 143      Potassium 4.4      Chloride 107      Carbon Dioxide (CO2) 24      Anion Gap 12      Urea Nitrogen 26.1 (*)     Creatinine 0.81      GFR Estimate 86      Calcium 8.9      Glucose 106 (*)    CBC WITH PLATELETS AND DIFFERENTIAL - Abnormal    WBC Count 10.8      RBC Count 3.85 (*)     Hemoglobin 11.8 (*)     Hematocrit 36.0 (*)     " MCV 94      MCH 30.6      MCHC 32.8      RDW 13.7      Platelet Count 285      % Neutrophils 74      % Lymphocytes 16      % Monocytes 8      % Eosinophils 1      % Basophils 1      % Immature Granulocytes 0      NRBCs per 100 WBC 0      Absolute Neutrophils 8.1      Absolute Lymphocytes 1.7      Absolute Monocytes 0.9      Absolute Eosinophils 0.1      Absolute Basophils 0.1      Absolute Immature Granulocytes 0.0      Absolute NRBCs 0.0         RADIOLOGY:  Reviewed all pertinent imaging. Please see official radiology report.  Cervical spine MRI w/o contrast   Final Result   IMPRESSION:   1.  Superior endplate compression fracture of the C7 vertebrae with loss of 15% of vertebral body height. Subtle high signal change within the anterior/superior margin of the C7 vertebrae suggests that this fracture is acute/subacute in nature. No    retropulsion of bony elements. No compromise of the spinal canal.            2.  Subtle superior endplate compression fracture of the T1 vertebrae demonstrates no abnormal edema signal on the MRI scan. This fracture is chronic.            3.  Superior endplate compression fracture involving the T3 vertebrae demonstrate subtle high signal change within the posterior margin of the T3 vertebrae on the STIR sequences suggesting bone edema representing a acute/subacute fracture. Loss of 25-30%    of vertebral body height. No retropulsion of bony elements.      4.  Mild DDD change of the cervical disc spaces.         XR Cervical Spine 2/3 Views   Final Result   IMPRESSION: Satisfactory cervical alignment. Very mild loss of height superior endplate C7 on T1 noted on prior CT is redemonstrated at C7, with obscuration of T1 due to overlap of adjacent structures. Degenerative changes with interspace narrowing    centered at C5-C6 as before. Lung apices show minimal scarring. AICD leads. Cervical facet joint spondylosis. Rightward convexity curvature apex T1. Appropriate appearance to the airway  and cervical prevertebral soft tissues. Otherwise unremarkable.         XR Chest 1 View   Final Result   IMPRESSION: No change in a dual-lead cardiac pacer. There is no evidence of lead discontinuity or abandoned leads. Heart size is within normal limits given technique. There is minimal left basilar scarring versus atelectasis. Right lung is clear. No    pleural effusion or pneumothorax.      Elbow XR,  2 views, left   Final Result   IMPRESSION:    1. No visualized acute fracture or malalignment of the left elbow.   2. No left elbow joint effusion.       XR Humerus Left G/E 2 Views   Final Result   IMPRESSION: Osteopenia. Negative for fracture or dislocation. Degenerative changes at the acromioclavicular joint. Left-sided cardiac pacer noted.      Cervical spine CT w/o contrast   Final Result   IMPRESSION:   HEAD CT:   1.  No acute intracranial abnormality or significant change compared to the prior study.       CERVICAL SPINE CT:   1.  Mild superior endplate height loss at C7 and T1 has developed since the cervical spine CT of 02/19/2023. These are technically age-indeterminate. No retropulsion.      2.  No other fracture.                          Head CT w/o contrast   Final Result   IMPRESSION:   HEAD CT:   1.  No acute intracranial abnormality or significant change compared to the prior study.       CERVICAL SPINE CT:   1.  Mild superior endplate height loss at C7 and T1 has developed since the cervical spine CT of 02/19/2023. These are technically age-indeterminate. No retropulsion.      2.  No other fracture.                          XR Cervical Spine 2/3 Views    (Results Pending)         I, Arya Lu, am serving as a scribe to document services personally performed by Dr. Tomas Avelar, based on my observation and the provider's statements to me. ITomas MD attest that Arya Lu is acting in a scribe capacity, has observed my performance of the services and has documented them in accordance  with my direction.    Tomas Avelar M.D.  Emergency Medicine  [facility] EMERGENCY DEPARTMENT  [address] [place] [id number] [phone]  Dept: [phone]       Tomas Avelar MD  09/29/23 4324

## 2023-09-29 NOTE — PROGRESS NOTES
Is the implanted device safe for MRI Exam?  Yes  Is this device 3T compatible? Yes  Device Type: Pacemaker       Device Information: Medtronic, PPM [place] (D)      Cardiology Orders for Device Programming     -- Yes -- The patient has a MRI conditional pulse generator and leads from the same     -- Yes -- The pulse generator and leads have been implanted for at least 6 weeks. (Does not apply to [employer] devices)    -- Yes-- The device is implanted in the right or left pectoral region    -- Yes -- There are not any additional active cardiac devices, abandoned leads, lead extenders or adapters    -- Yes -- The device lead impedance measurements are within the normal range. (Manufacture recommendations: Medtronic Advisa and Revo 200-1,500 ohms; Medtronic ICD and CRT's 200-3000 ohms and defibrillation lead impedance   ohms)    -- N/A -- If the patient is pacemaker dependent the thresholds are less than or equal to 2.0V @ 0.4ms.    -- Yes -- RA and RV leads are programmed to bipolar pacing operation or pacing off. If no, CONTACT THE DEVICE REP FOR PROGRAMMING     Date of last Remote Device check: 6/12/2023  Results of last Device check:  1.   Right atrium impedance: 380  2.   Right ventricle impedance: 399  3.   Left ventricle impedance: n/a     4.   Right atrium threshold: 1.5 V @ 0.4 ms  5.   Right ventricle threshold: 2.5 V @ 0.4 ms  6.   Left ventricle threshold: n/a     Device programming during the scan guidelines   Pacing Mode (check one): DOO or Use the recommended pacing mode per Medtronic MRI access Application  Pacing Rate: 80 bpm or >intrinsic    Jemal Olivares RN on 9/29/2023 at 9:55 AM

## 2023-09-30 NOTE — PLAN OF CARE
Problem: Plan of Care - These are the overarching goals to be used throughout the patient stay.    Goal: Absence of Hospital-Acquired Illness or Injury  Outcome: Progressing  Intervention: Identify and Manage Fall Risk  Recent Flowsheet Documentation  Taken 9/29/2023 1906 by Ezio Lovelace RN  Safety Promotion/Fall Prevention:   activity supervised   assistive device/personal items within reach   bedside attendant   clutter free environment maintained   lighting adjusted   nonskid shoes/slippers when out of bed   patient and family education   safety round/check completed   supervised activity     Problem: Plan of Care - These are the overarching goals to be used throughout the patient stay.    Goal: Optimal Comfort and Wellbeing  Outcome: Progressing     Problem: Behavioral Health Comorbidity  Goal: Maintenance of Behavioral Health Symptom Control  Outcome: Progressing     Problem: Orthopaedic Fracture  Goal: Absence of Bleeding  Outcome: Progressing  Goal: Effective Bowel Elimination  Outcome: Progressing  Goal: Absence of Embolism Signs and Symptoms  Outcome: Progressing  Goal: Fracture Stability  Outcome: Progressing  Goal: Optimal Functional Ability  Outcome: Progressing  Goal: Absence of Infection Signs and Symptoms  Outcome: Progressing  Goal: Effective Tissue Perfusion  Outcome: Progressing  Goal: Optimal Pain Control and Function  Outcome: Progressing  Goal: Effective Oxygenation and Ventilation  Outcome: Progressing     Pt confused and oriented to himself and somewhat the location. Pt admitted from memory [facility], which he fell and found to have a C7 and T1 fracture. Aspen cervical collar on at all times. Pt on 1:1 d/t restless and agitation during cares; pt pulling on lines/tubing and cervical collar. Pt did tolerated HS medications crushed in applesauce. IV lorazepam given this shift d/t restless, which was effective. Pt currently resting in bed. Incontinent cares performed. IV NS infusing at 50 mL/hr. Pt  is on bedrest per MD orders.

## 2023-09-30 NOTE — H&P
[facility]    History and Physical - Hospitalist Service       Date of Admission:  9/29/2023    Assessment & Plan          Rosemary Farmer is a 88 year old male admitted on 9/29/2023. H/o dementia with behavioral disturbance and lives in [facility] [place]. Detailed history was unavailable as patient is confused and patient's wife provided information that she knew of. Patient had a fall earlier this morning at 2 am at the facility following which he has been significantly confused and agitated. He is partially oriented at baseline per wife. Patient subsequently came to the ER for further evaluation and management. He was found to have C7#, T1#, T3#, acute confusional state with behavioral disturbance.          A/p :         S/p Fall, mechanical  Acute to subacute C7 #  Chronic T1#  Acute to subacute T3#        CT of head did not show any acute abnormality.   Spine imaging showed : uperior endplate compression fracture of the C7 vertebrae with loss of 15% of vertebral body height. Subtle high signal change within the anterior/superior margin of the C7 vertebrae suggests that this fracture is acute/subacute in nature.   Also found to have chronic compression fracture of the T1 vertebrae, acute/subacute T3 vertebrae #   Spine surgery consulted, recommend conservative management, cervical collar x 6-12 weeks time considering # is acute to subacute with follow up in 6 weeks time.    Will do pain control, pt/ot evaluation, iv ativan prn for agitation, sitter present.  Discussed with code status with wife : ok with CPR, no intubation.  Will await recommendation from spine surgery team on T3# which is acute to subacute - to see if he needs brace.          Elevated BP : iv hydralazine prn    Acute encephalopathy associated with dementia and behavioral disturbance : on seroquel 12.5 mg bid, 25 mg bid prn for agitation, ramelteon 8 mg at bedtime, trazodone 100 mg at bedtime -at home, iv  ativan prn, holding on haldol due to h/o parkinson.    Parkinson h/o : on sinemet  mg tid  Neuropathy On lyrica 50 mg bid  H/o a fib  S/p pacemaker            Diet: Regular Diet Adult    DVT Prophylaxis: Pneumatic Compression Devices  Manzo Catheter: Not present  Lines: None     Cardiac Monitoring: None  Code Status:  ok for CPK, no intubation    Clinically Significant Risk Factors Present on Admission                    # Dementia: noted on problem list               Disposition Plan      Expected Discharge Date: 09/30/2023                  Wilfredo Solis MD  Hospitalist Service  [facility]  Securely message with [employer] (more info)  Text page via [employer] Paging/Directory     ______________________________________________________________________    Chief Complaint   Fall     History obtained from wife    History of Present Illness       Rosemary Farmer is a 88 year old male admitted on 9/29/2023. H/o dementia with behavioral disturbance and lives in [facility] [place]. Detailed history was unavailable as patient is confused and patient's wife provided information that she knew of. Patient had a fall earlier this morning at 2 am at the facility following which he has been significantly confused and agitated. He is partially oriented at baseline per wife.     Patient subsequently came to the ER for further evaluation and management.      CT of head did not show any acute abnormality.   Spine imaging showed : uperior endplate compression fracture of the C7 vertebrae with loss of 15% of vertebral body height. Subtle high signal change within the anterior/superior margin of the C7 vertebrae suggests that this fracture is acute/subacute in nature.   Also found to have chronic compression fracture of the T1 vertebrae, acute/subacute T3 vertebrae #   Spine surgery consulted, recommend conservative management, cervical collar x 6-12 weeks time considering # is acute to subacute with  follow up in 6 weeks time.    Will do pain control, pt/ot evaluation, iv ativan prn for agitation, sitter present.  Discussed with code status with wife : ok with CPR, no intubation.      Past Medical History    Past Medical History:   Diagnosis Date    Enlarged prostate     Ulcer        Past Surgical History   Past Surgical History:   Procedure Laterality Date    GLAUCOMA SURGERY  2006    SHOULDER SURGERY  08/23/2010    4 shoulder surgeries including 2 rotator cuff    VARICOCELECTOMY  1986    VARICOCELECTOMY  2008       Prior to Admission Medications   Prior to Admission Medications   Prescriptions Last Dose Informant Patient Reported? Taking?   QUEtiapine (SEROQUEL) 25 MG tablet 9/28/2023 at 1500  Yes Yes   Sig: Take 12.5 mg by mouth 2 times daily 0800 & 1500   QUEtiapine (SEROQUEL) 25 MG tablet  at prn  Yes Yes   Sig: Take 25 mg by mouth 2 times daily as needed (restlessness/ agitation)   QUEtiapine (SEROQUEL) 50 MG tablet 9/28/2023 at 2000  Yes Yes   Sig: Take 50 mg by mouth At Bedtime   acetaminophen (TYLENOL) 500 MG tablet 9/28/2023 at 2000  Yes Yes   Sig: Take 1,000 mg by mouth 3 times daily   acetaminophen (TYLENOL) 500 MG tablet  at prn  Yes Yes   Sig: Take 500-1,000 mg by mouth daily as needed for mild pain   carbidopa-levodopa (SINEMET)  MG tablet 9/28/2023 at 2000  Yes Yes   Sig: Take 1 tablet by mouth 3 times daily   dorzolamide-timolol (COSOPT) 2-0.5 % ophthalmic solution 9/28/2023 at 2000  Yes Yes   Sig: Place 1 drop into both eyes 2 times daily   latanoprost (XALATAN) 0.005 % ophthalmic solution 9/28/2023 at 2000  Yes Yes   Sig: Place 1 drop into both eyes At Bedtime   polyethylene glycol (MIRALAX) 17 g packet 9/28/2023 at 2000  Yes Yes   Sig: Take 1 packet by mouth 2 times daily   pregabalin (LYRICA) 50 MG capsule 9/28/2023 at 2000  Yes Yes   Sig: Take 50 mg by mouth 2 times daily   ramelteon (ROZEREM) 8 MG tablet 9/28/2023 at 2000  Yes Yes   Sig: Take 8 mg by mouth At Bedtime   senna-docusate  (SENOKOT-S/PERICOLACE) 8.6-50 MG tablet 9/28/2023 at 2000  Yes Yes   Sig: Take 2 tablets by mouth 2 times daily   traZODone (DESYREL) 100 MG tablet 9/28/2023 at 2000  Yes Yes   Sig: Take 100 mg by mouth At Bedtime      Facility-Administered Medications: None        Review of Systems         No fevers  No h/o cardiorespiratory, abdominal, urinary symptoms  No new focal neuro symptoms      Physical Exam   Vital Signs: Temp: 98  F (36.7  C) Temp src: Oral BP: (!) 234/107 Pulse: 88   Resp: 16 SpO2: 97 % O2 Device: None (Room air)    Weight: 190 lbs 0 oz       GENERAL: The patient is not in any acute distressed. Awake and alert.  HEENT: Nonicteric sclerae, PERRLA, EOMI. Oropharynx clear. Moist mucous membranes. Conjunctivae appear well perfused.  HEART: Regular rate and rhythm without murmurs.  LUNGS: Clear to auscultation bilaterally. No wheezing or crackles.  ABDOMEN: Soft, positive bowel sounds, nontender.  SKIN: No rash, no excessive bruising, petechiae, or purpura.  EXTREMITIES : no rashes, no swelling in legs.  NEUROLOGIC: conscious and not oriented, doesn't follows commands, no obvious focal deficits.  ROS: All other systems negative       Medical Decision Making       76 MINUTES SPENT BY ME on the date of service doing chart review, history, exam, documentation & further activities per the note.  MANAGEMENT DISCUSSED with the following over the past 24 hours: rn, patient       Data     I have personally reviewed the following data over the past 24 hrs:    10.8  \   11.8 (L)   / 285     143 107 26.1 (H) /  106 (H)   4.4 24 0.81 \

## 2023-09-30 NOTE — PLAN OF CARE
Problem: Plan of Care - These are the overarching goals to be used throughout the patient stay.    Goal: Plan of Care Review  Description: The Plan of Care Review/Shift note should be completed every shift.  The Outcome Evaluation is a brief statement about your assessment that the patient is improving, declining, or no change.  This information will be displayed automatically on your shift note.  Outcome: Progressing     Problem: Orthopaedic Fracture  Goal: Absence of Bleeding  Outcome: Progressing   Goal Outcome Evaluation:  Pt confuse, sometimes alert to self, has aspen cervical collar. 1:1 remain for safe, slept between cares

## 2023-09-30 NOTE — PLAN OF CARE
"Patient drowsy and disoriented x 3.  Refused oral medications this morning. Was sleepy early on in the shift.  Now is agitated, restless, removed cervical collar, attempting to get out of bed by himself.  Kicking and spitting at nursing staff. undirectable  IM zyprexa given. Will continue to monitor. Faith Heart RN     PRIMARY DIAGNOSIS: \"GENERIC\" NURSING  OUTPATIENT/OBSERVATION GOALS TO BE MET BEFORE DISCHARGE:  ADLs back to baseline: No    Activity and level of assistance: Has not been up out of bed yet today.     Pain status: Improved-controlled with oral pain medications.    Return to near baseline physical activity: No     Discharge Planner Nurse   Safe discharge environment identified: No  Barriers to discharge: Yes       Entered by: Faith Heart RN 09/30/2023 10:05 AM   Very agitated and confused. Refusing scheduled medications.  Has not had PT/OT evaluations yet.   Please review provider order for any additional goals.   Nurse to notify provider when observation goals have been met and patient is ready for discharge.Goal Outcome Evaluation:                        "

## 2023-09-30 NOTE — PLAN OF CARE
Occupational Therapy: Orders received. Chart reviewed and discussed with care team.? Occupational Therapy not indicated due to pt from memory care and has dementia at baseline.  Per PT and nursing, pt will need assist with ADLs at baseline.  No skilled OT indicated at this time..? Defer discharge recommendations to PT/MD.? Will complete orders.

## 2023-09-30 NOTE — PROGRESS NOTES
[facility]    Medicine Progress Note - Hospitalist Service    Date of Admission:  9/29/2023    Assessment & Plan   88-year-old with severe dementia with behavioral disturbance who lives in a senior memory care unit at [place] was brought in due to recurrent falls.  He sustained acute on chronic C7 and acute on chronic T3 fracture.  Neurosurgery was consulted and recommended conservative treatment.  Patient's behavior is not controlled with his home medications.  He is requiring one-to-one sitter and nonpharmacological measures are unable to control his behavior.  Given recurrent falls and ongoing issues with behavior prognosis is guarded.  Will discuss with patient's wife.  Will order IM Zyprexa since patient is not cooperative p.o. meds.  Review of record shows that patient geriatrician recommended tapering off Parkinson's medicines.  Given continued agitation I am inclined to DC Sinemet.  We will also schedule IV Dilaudid for pain controls since this could be contributing to patient's agitation.  Patient does not let his needs known.  Therefore we will check his blood sugar twice a day.  Order bladder management protocol.    S/p Fall, mechanical  Acute to subacute C7 #  Chronic T1#  Acute to subacute T3#           CT of head did not show any acute abnormality.   Spine imaging showed : uperior endplate compression fracture of the C7 vertebrae with loss of 15% of vertebral body height. Subtle high signal change within the anterior/superior margin of the C7 vertebrae suggests that this fracture is acute/subacute in nature.   Also found to have chronic compression fracture of the T1 vertebrae, acute/subacute T3 vertebrae #   Spine surgery consulted, recommend conservative management, cervical collar x 6-12 weeks time considering # is acute to subacute with follow up in 6 weeks time.    We will order scheduled pain control but will refrain from Ativan since it can make his behavior worse.       Previous hospitalist discussed with code status with wife : ok with CPR, no intubation.  However patient has guarded prognosis given recurrent falls and ongoing issues with behavior and memory.  Therefore need to readdress his CODE STATUS..  Discussed with patient's wife and change CODE STATUS to DNR.  Will await recommendation from spine surgery team on T3# which is acute to subacute - to see if he needs brace.              Elevated BP : iv hydralazine prn     Acute encephalopathy associated with dementia and behavioral disturbance : on seroquel 12.5 mg bid, 25 mg bid prn for agitation, ramelteon 8 mg at bedtime, trazodone 100 mg at bedtime -at home  --Ordered IM Zyprexa 2.5 mg twice daily as needed for agitation.    May need DC of Sinemet given ongoing issues with Parkinson's.  Parkinson h/o : Chart review shows that patient's geriatrician recommended tapering of Sinemet in May 2023.  Given ongoing agitation hold Sinemet  Neuropathy On lyrica 50 mg bid  H/o a fib  S/p pacemaker       Diet: Regular Diet Adult    DVT Prophylaxis: Low Risk/Ambulatory with no VTE prophylaxis indicated  Manzo Catheter: Not present  Lines: None     Cardiac Monitoring: None  Code Status:  DNR    Clinically Significant Risk Factors Present on Admission                    # Dementia: noted on problem list               Disposition Plan     Expected Discharge Date: 09/30/2023                  Abdiaziz Marte MD  Hospitalist Service  [facility]  Securely message with [employer] (more info)  Text page via [employer] Paging/Directory   ______________________________________________________________________    Interval History   Patient new to me.  Chart reviewed.  Continues to get out of bed.  He is unable to communicate.  No family at bedside.  2.5 mg of Zyprexa given.  Patient is allergic to Haldol due to Parkinson's.  Is refusing to take p.o.    Physical Exam   Vital Signs: Temp: 97.6  F (36.4  C) Temp src: Axillary BP:  139/65 Pulse: 60   Resp: 18 SpO2: 96 % O2 Device: None (Room air)    Weight: 190 lbs 0 oz    Unable to perform exam since patient is not cooperative  Is kicking the nurse  Trying to remove IV line.    Medical Decision Making       35 MINUTES SPENT BY ME on the date of service doing chart review, history, exam, documentation & further activities per the note.  MANAGEMENT DISCUSSED with the following over the past 24 hours: RN       Data

## 2023-09-30 NOTE — PLAN OF CARE
"PRIMARY DIAGNOSIS: \"GENERIC\" NURSING  OUTPATIENT/OBSERVATION GOALS TO BE MET BEFORE DISCHARGE:  ADLs back to baseline: No    Activity and level of assistance: Up with maximum assistance. Consider SW and/or PT evaluation.     Pain status: Improved but still requiring IV narcotics.    Return to near baseline physical activity: No     Discharge Planner Nurse   Safe discharge environment identified: No  Barriers to discharge: Yes       Entered by: Kait Worrell RN 09/30/2023 4:29 PM     Please review provider order for any additional goals.   Nurse to notify provider when observation goals have been met and patient is ready for discharge.Goal Outcome Evaluation:                        "

## 2023-09-30 NOTE — PLAN OF CARE
PRIMARY DIAGNOSIS: ACUTE PAIN  OUTPATIENT/OBSERVATION GOALS TO BE MET BEFORE DISCHARGE:  1. Pain Status: Improved but still requiring IV narcotics.    2. Return to near baseline physical activity: No    3. Cleared for discharge by consultants (if involved): No    Discharge Planner Nurse   Safe discharge environment identified: Yes  Barriers to discharge: Yes       Entered by: Gianfranco Patino RN 09/30/2023 12:29 PM     Please review provider order for any additional goals.   Nurse to notify provider when observation goals have been met and patient is ready for discharge.Goal Outcome Evaluation:

## 2023-10-01 NOTE — PROGRESS NOTES
[facility]    Medicine Progress Note - Hospitalist Service    Date of Admission:  9/29/2023    Assessment & Plan   88-year-old with severe dementia with behavioral disturbance who lives in a senior memory care unit at [place] was brought in due to recurrent falls.  He sustained acute on chronic C7 and acute on chronic T3 fracture.  Neurosurgery was consulted and recommended conservative treatment.  Patient's behavior is not controlled with his home medications.  He is requiring one-to-one sitter and nonpharmacological measures are unable to control his behavior.  Given recurrent falls and ongoing issues with behavior prognosis is guarded.  Patient is much calmer on 10/1.  Will restart Sinemet at a lower dose    S/p Fall, mechanical  Acute to subacute C7 #  Chronic T1#  Acute to subacute T3#           CT of head did not show any acute abnormality.   Spine imaging showed : uperior endplate compression fracture of the C7 vertebrae with loss of 15% of vertebral body height. Subtle high signal change within the anterior/superior margin of the C7 vertebrae suggests that this fracture is acute/subacute in nature.   Also found to have chronic compression fracture of the T1 vertebrae, acute/subacute T3 vertebrae #   Spine surgery consulted, recommend conservative management, cervical collar x 6-12 weeks time considering # is acute to subacute with follow up in 6 weeks time.    --DC Ativan since it made his behavior worse.  -- Change IV scheduled hydromorphone to p.o. scheduled hydromorphone for 4 days.  Previous hospitalist discussed with code status with wife : CODE STATUS changed to DNR on 9/30   Will await recommendation from spine surgery team on T3# which is acute to subacute - to see if he needs brace.              Elevated BP : iv hydralazine prn     Acute encephalopathy associated with dementia and behavioral disturbance : on seroquel 12.5 mg bid, 25 mg bid prn for agitation, ramelteon 8 mg  at bedtime, trazodone 100 mg at bedtime -at home  --Ordered IM Zyprexa 2.5 mg twice daily as needed for agitation.    High-dose Sinemet was DC'd on 9/30.  Will restart Sinemet at  3 times daily on 10/1.  Continue to taper off Sinemet as recommended by patient geriatrician in May 2023  No evidence of hypoglycemia.  Neuropathy On lyrica 50 mg bid  H/o a fib  S/p pacemaker    Discussed with pt's wife     Diet: Regular Diet Adult    DVT Prophylaxis: Low Risk/Ambulatory with no VTE prophylaxis indicated  Manzo Catheter: Not present  Lines: None     Cardiac Monitoring: None  Code Status: No CPR- Do NOT IntubateDNR    Clinically Significant Risk Factors Present on Admission                    # Dementia: noted on problem list                 Disposition Plan           Abdiaziz Marte MD  Hospitalist Service  [facility]  Securely message with [employer] (more info)  Text page via [employer] Paging/Directory   ______________________________________________________________________    Interval History   Patient is much calmer.  He is eating breakfast with a sitter.  Cannot follow directions such as wiggling his toes or raising his arm.  He cannot verbalize his symptoms and therefore  Blood sugars have been 124 and 98 with no hypoglycemia    Physical Exam   Vital Signs: Temp: 98  F (36.7  C) Temp src: Axillary BP: (!) 173/75 Pulse: 60   Resp: 18 SpO2: 97 % O2 Device: None (Room air)    Weight: 190 lbs 0 oz  , But does not follow directions.  Eating breakfast    Medical Decision Making       35 MINUTES SPENT BY ME on the date of service doing chart review, history, exam, documentation & further activities per the note.  MANAGEMENT DISCUSSED with the following over the past 24 hours: RN       Data

## 2023-10-01 NOTE — PROGRESS NOTES
10/01/23 3582   Appointment Info   Signing Clinician's Name / Credentials (PT) Felicia Vidal,PT   Living Environment   Current Living Arrangements other (see comments)  (memory care facility)   Living Environment Comments pt unable to naswer questions information per chart   Self-Care   Equipment Currently Used at Home   (pt reported he uses SEcane for walking)   General Information   Onset of Illness/Injury or Date of Surgery 09/29/23   Referring Physician Dr. Abdiaziz Marte   Patient/Family Therapy Goals Statement (PT) none stated   Pertinent History of Current Problem (include personal factors and/or comorbidities that impact the POC) 88-year-old with severe dementia with behavioral disturbance who lives in a senior memory care unit at [place] was brought in due to recurrent falls.  He sustained acute on chronic C7 and acute on chronic T3 fracture.  Neurosurgery was consulted and recommended conservative treatment.  Patient's behavior is not controlled with his home medications.  He is requiring one-to-one sitter and nonpharmacological measures are unable to control his behavior.  Given recurrent falls and ongoing issues with behavior prognosis is guarded.  Will discuss with patient's wife.  Will order IM Zyprexa since patient is not cooperative p.o. meds.  Review of record shows that patient geriatrician recommended tapering off Parkinson's medicines.  Given continued agitation I am inclined to DC Sinemet.   Existing Precautions/Restrictions spinal;other (see comments)  (cervical coolar all times)   General Observations pt in bed , 1: 1 present, pt agreeable to PT   Cognition   Affect/Mental Status (Cognition) unable/difficult to assess;other (see comments)   Follows Commands (Cognition) follows one-step commands;50-74% accuracy;verbal cues/prompting required;repetition of directions required   Pain Assessment   Patient Currently in Pain   (pt did not show signs of pain with mobility)   Range of Motion  (ROM)   ROM Comment BLE with A WFL   Bed Mobility   Bed Mobility Limitations cognitive deficits   Impairments Contributing to Impaired Bed Mobility decreased strength;impaired balance   Assistive Device (Bed Mobility) bed rails;draw sheet;other (see comments)  (HOB elevated)   Comment, (Bed Mobility) pt appeared fearful of moving/falling increased time and effort to get to EOB, pt resisting at times   Transfers   Transfer Safety Concerns Noted decreased weight-shifting ability;losing balance backward;other (see comments)  (pt leaning back, resisting movement)   Impairments Contributing to Impaired Transfers impaired balance;decreased strength;other (see comments)  (decreased cognition)   Comment, (Transfers) sit<>stand maxAx1 unable to complete stand - pt extending back appears fearful   Gait/Stairs (Locomotion)   Ida Level (Gait) 2 person assist   Assistive Device (Gait) other (see comments)  (arm in arm)   Distance in Feet pivot trasnfer to recliner   Comment, (Gait/Stairs) pt uable to stand fully upraight to attempt steps to recliner   Balance   Balance Comments unsteady   Clinical Impression   Criteria for Skilled Therapeutic Intervention Yes, treatment indicated   PT Diagnosis (PT) decreased fucntional mobiity   Influenced by the following impairments fear, cognition, weakness, recent fall- cervical collar all times   Functional limitations due to impairments bed mob, transfers, gait   Clinical Presentation (PT Evaluation Complexity) Evolving/Changing   Clinical Presentation Rationale presents as medically diagnosed   Clinical Decision Making (Complexity) moderate complexity   Planned Therapy Interventions (PT) bed mobility training;gait training;transfer training;progressive activity/exercise   Anticipated Equipment Needs at Discharge (PT) wheelchair;other (see comments)  (pt may have FWW at facility)   Risk & Benefits of therapy have been explained evaluation/treatment results reviewed   PT Total  Evaluation Time   PT Eval, Moderate Complexity Minutes (51612) 15   Physical Therapy Goals   PT Frequency 4x/week   PT Predicted Duration/Target Date for Goal Attainment 10/11/23   PT Goals Bed Mobility;Transfers;Gait   PT: Bed Mobility Moderate assist;Supine to/from sit;Within precautions   PT: Transfers Moderate assist;Sit to/from stand;Bed to/from chair;Assistive device;Within precautions   PT: Gait Moderate assist;5 feet;Rolling walker;Within precautions   Interventions   Interventions Quick Adds Therapeutic Activity   Therapeutic Activity   Therapeutic Activities: dynamic activities to improve functional performance Minutes (48245) 10   Treatment Detail/Skilled Intervention sat EOB [id number], pt able to scoot hips back in bed, pt appears fearful of falling or A with mobility, attempted sit<>stand several times with maxAx1-2 able to complete 2 stands with arm in arm A,   PT Discharge Planning   PT Plan log rolling/bed mob, sit<>stand progress to using FWW, goal to trasnfer to recliner w/ FWW, cervical collar all times   PT Discharge Recommendation (DC Rec) Long term care facility   PT Rationale for DC Rec pt fearful of falling or moving, increased time to become comfortable with A and task, pt maxAx1 bed mob, maxAx2 sit<>stand arm in arm, recommend baron transfers for safety   PT Brief overview of current status pt fearful of falling or moving, increased time to become comfortable with A and task, pt maxAx1 bed mob, maxAx2 sit<>stand arm in arm, recommend baron transfers for safety   Total Session Time   Timed Code Treatment Minutes 10   Total Session Time (sum of timed and untimed services) 25

## 2023-10-01 NOTE — PROGRESS NOTES
"Pt alert to self, manage pain with IV dilaudid and schedule tylenol, Prn Seroquel given for restless, 1:1 remain for safety, slept between cares     PRIMARY DIAGNOSIS: \"GENERIC\" NURSING  OUTPATIENT/OBSERVATION GOALS TO BE MET BEFORE DISCHARGE:  ADLs back to baseline: No    Activity and level of assistance: Up with maximum assistance. Consider SW and/or PT evaluation.     Pain status: Improved but still requiring IV narcotics.    Return to near baseline physical activity: No     Discharge Planner Nurse   Safe discharge environment identified: Yes  Barriers to discharge: Yes       Entered by: Ny Denise RN 10/01/2023 6:41 AM     Please review provider order for any additional goals.   Nurse to notify provider when observation goals have been met and patient is ready for discharge.  "

## 2023-10-01 NOTE — PROGRESS NOTES
"PRIMARY DIAGNOSIS: \"GENERIC\" NURSING  OUTPATIENT/OBSERVATION GOALS TO BE MET BEFORE DISCHARGE:  ADLs back to baseline: No    Activity and level of assistance: Up with maximum assistance. Consider SW and/or PT evaluation.     Pain status: Improved but still requiring IV narcotics.    Return to near baseline physical activity: No     Discharge Planner Nurse   Safe discharge environment identified: Yes  Barriers to discharge: Yes       Entered by: Ny Denise RN 10/01/2023 1:57 AM     Please review provider order for any additional goals.   Nurse to notify provider when observation goals have been met and patient is ready for discharge.  "

## 2023-10-01 NOTE — UTILIZATION REVIEW
Inpatient appropriate    Admission Status; Secondary Review Determination       Under the authority of the Utilization Management Committee, the utilization review process indicated a secondary review on the above patient. The review outcome is based on review of the medical records, discussions with staff, and applying clinical experience noted on the date of the review.     (x) Inpatient Status Appropriate - This patient's medical care is consistent with medical management for inpatient care and reasonable inpatient medical practice.     RATIONALE FOR DETERMINATION   88-year-old with severe dementia with behavioral disturbance who lives in a senior memory care unit was brought in due to recurrent falls. He sustained acute on chronic C7 and acute on chronic T3 fracture. Neurosurgery was consulted and recommended conservative treatment. Patient's behavior is not controlled with his home medications. He is requiring one-to-one sitter as nonpharmacological measures are unable to control his behavior.  Also, patient continues to have significant amount of pain requiring IV narcotic.    At the time of admission with the information available to the attending physician more than 2 nights Hospital complex care was anticipated, based on patient risk of adverse outcome if treated as outpatient and complex care required. Inpatient admission is appropriate based on the Medicare guidelines.     The information on this document is developed by the utilization review team in order for the business office to ensure compliance. This only denotes the appropriateness of proper admission status and does not reflect the quality of care rendered.   The definitions of Inpatient Status and Observation Status used in making the determination above are those provided in the CMS Coverage Manual, Chapter 1 and Chapter 6, section 70.4.   Sincerely,   Jeffrey Rivers MD  Utilization Review  Physician Advisor  [facility].

## 2023-10-01 NOTE — PLAN OF CARE
Patient has been drowsy and oriented to self only.  He was able to sit up in chair today after 2 assist with PT to the chair.  He was transferred back to bed using the baron lift later in the shift.  He continues to need 1 to 1 for safety to prevent pulling at lines/ and cervical collar.  He took meds crushed with pudding.  He sometimes needs re approached for medications at times. He does not like food touching the top of his lip. Vitals stable. Passed onto monitor. Faith Heart RN     Problem: Plan of Care - These are the overarching goals to be used throughout the patient stay.    Goal: Absence of Hospital-Acquired Illness or Injury  Outcome: Progressing  Intervention: Identify and Manage Fall Risk  Recent Flowsheet Documentation  Taken 10/1/2023 0830 by Faith Haert RN  Safety Promotion/Fall Prevention:   activity supervised   bedside attendant  Goal: Optimal Comfort and Wellbeing  Outcome: Progressing     Problem: Behavioral Health Comorbidity  Goal: Maintenance of Behavioral Health Symptom Control  Outcome: Progressing  Intervention: Maintain Behavioral Health Symptom Control  Recent Flowsheet Documentation  Taken 10/1/2023 0830 by Faith Heart RN  Medication Review/Management: medications reviewed     Problem: Orthopaedic Fracture  Goal: Fracture Stability  Outcome: Progressing  Goal: Optimal Functional Ability  Outcome: Progressing  Intervention: Optimize Functional Ability  Recent Flowsheet Documentation  Taken 10/1/2023 0830 by Faith Heart RN  Activity Management: activity adjusted per tolerance  Goal: Absence of Infection Signs and Symptoms  Outcome: Progressing  Goal: Effective Tissue Perfusion  Outcome: Progressing  Goal: Optimal Pain Control and Function  Outcome: Progressing  Goal: Effective Oxygenation and Ventilation  Outcome: Progressing  Intervention: Promote Airway Secretion Clearance  Recent Flowsheet Documentation  Taken 10/1/2023 0830 by Faith Heart  RN  Cough And Deep Breathing: done with encouragement  Activity Management: activity adjusted per tolerance     Problem: Violence Risk or Actual  Goal: Anger and Impulse Control  Outcome: Progressing  Intervention: Minimize Safety Risk  Recent Flowsheet Documentation  Taken 10/1/2023 0830 by Faith Heart RN  Enhanced Safety Measures:  at bedside     Problem: Risk for Delirium  Goal: Optimal Coping  Outcome: Progressing  Goal: Improved Behavioral Control  Outcome: Progressing  Intervention: Minimize Safety Risk  Recent Flowsheet Documentation  Taken 10/1/2023 0830 by Faith Heart RN  Enhanced Safety Measures:  at bedside  Goal: Improved Attention and Thought Clarity  Outcome: Progressing  Goal: Improved Sleep  Outcome: Progressing   Goal Outcome Evaluation:

## 2023-10-01 NOTE — PLAN OF CARE
"PRIMARY DIAGNOSIS: \"GENERIC\" NURSING  OUTPATIENT/OBSERVATION GOALS TO BE MET BEFORE DISCHARGE:  ADLs back to baseline: No    Activity and level of assistance: Up with maximum assistance. Consider SW and/or PT evaluation.     Pain status: Improved but still requiring IV narcotics.    Return to near baseline physical activity: No     Discharge Planner Nurse   Safe discharge environment identified: Yes  Barriers to discharge: Yes       Entered by: Kait Worrell RN 09/30/2023 11:01 PM     Please review provider order for any additional goals.   Nurse to notify provider when observation goals have been met and patient is ready for discharge.Goal Outcome Evaluation:         Pt is alert and oriented to self, pt was restless and confuse, removed his collar and peripheral IV. Writer reorient the pt and put it back cervical collar and insert a new peripheral IV. Pt was given scheduled dilaudid for pain and non verbal indicator was absent. Pt voided twice with urinal with assist of staff and ate 100% of his dinner order.                "

## 2023-10-02 NOTE — PLAN OF CARE
Problem: Plan of Care - These are the overarching goals to be used throughout the patient stay.    Goal: Plan of Care Review  Description: The Plan of Care Review/Shift note should be completed every shift.  The Outcome Evaluation is a brief statement about your assessment that the patient is improving, declining, or no change.  This information will be displayed automatically on your shift note.  Outcome: Progressing  Flowsheets (Taken 10/2/2023 0959)  Plan of Care Reviewed With: patient     Problem: Behavioral Health Comorbidity  Goal: Maintenance of Behavioral Health Symptom Control  Outcome: Progressing  Intervention: Maintain Behavioral Health Symptom Control  Recent Flowsheet Documentation  Taken 10/2/2023 0805 by Ashley Krishna, RN  Medication Review/Management: medications reviewed   Goal Outcome Evaluation:patient swearing and trying to hit and kick and saying inappropriate things  Problem: Orthopaedic Fracture  Goal: Optimal Functional Ability  Intervention: Optimize Functional Ability-brace on neck  Problem: Restraint, Nonviolent  Goal: Absence of Harm or Injury  Outcome: Progressing  Intervention: Protect Skin and Joint Integrity  Recent Flowsheet Documentation  Taken 10/2/2023 0805 by Ashley Krishan, RN  Body Position: position changed independently   Soft restraints on left and right wrists - left wrist restraint removed at 950/patient pulling at IV and put back on - will monitor.  Patient will be fed and given medicatons crushed in applesauce. Wife in room  Recent Flowsheet Documentation  Taken 10/2/2023 0805 by Ashley Krishna, RN  Activity Management:   activity minimized   bedrest         Plan of Care Reviewed With: patient

## 2023-10-02 NOTE — PROGRESS NOTES
[facility]    Medicine Progress Note - Hospitalist Service    Date of Admission:  9/29/2023    Assessment & Plan   88-year-old with severe dementia with behavioral disturbance who lives in a senior memory care unit at [place] was brought in due to recurrent falls.  He sustained acute on chronic C7 and acute on chronic T3 fracture.  Neurosurgery was consulted and recommended conservative treatment.  Patient's behavior is not controlled with his home medications.  He is requiring one-to-one sitter and nonpharmacological measures are unable to control his behavior.  Given recurrent falls and ongoing issues with behavior prognosis is guarded.  Patient is much calmer on 10/1.  But agitated on 10/2.  Will decrease Sinemet to 5-50    S/p Fall, mechanical  Acute to subacute C7 #  Chronic T1#  Acute to subacute T3#           CT of head did not show any acute abnormality.   Spine imaging showed : uperior endplate compression fracture of the C7 vertebrae with loss of 15% of vertebral body height. Subtle high signal change within the anterior/superior margin of the C7 vertebrae suggests that this fracture is acute/subacute in nature.   Also found to have chronic compression fracture of the T1 vertebrae, acute/subacute T3 vertebrae #   Spine surgery consulted, recommend conservative management, cervical collar x 6-12 weeks time considering # is acute to subacute with follow up in 6 weeks time.    --DC Ativan since it made his behavior worse.  -- Continue p.o. scheduled hydromorphone for 4 days.  Previous hospitalist discussed with code status with wife : CODE STATUS changed to DNR on 9/30   Awaiting recommendation from spine surgery team on regarding superior endplate T3 fracture. Discussed with charge nurse regarding the same.     Elevated BP : iv hydralazine prn     Acute encephalopathy associated with dementia and behavioral disturbance : on seroquel 12.5 mg bid, 25 mg bid prn for agitation,  ramelteon 8 mg at bedtime, trazodone 100 mg at bedtime -at home  --continue IM Zyprexa 2.5 mg twice daily as needed for agitation.    High-dose Sinemet was DC'd on 9/30.  Patient did have agitation on  of Sinemet.  As per his geriatrician plan we will continue to taper off Sinemet.  Decrease Sinemet to 5-50.   no evidence of hypoglycemia.  Neuropathy On lyrica 50 mg bid  H/o a fib  S/p pacemaker    Discussed with pt's wife on 10/1     Diet: Regular Diet Adult    DVT Prophylaxis: Low Risk/Ambulatory with no VTE prophylaxis indicated  Manzo Catheter: Not present  Lines: None     Cardiac Monitoring: None  Code Status: No CPR- Do NOT IntubateDNR    Clinically Significant Risk Factors                      # Dementia: noted on problem list                 Disposition Plan      Expected Discharge Date: 10/07/2023    Discharge Delays: 1:1 Sitter still ordered - MD to assess            Abdiaziz Marte MD  Hospitalist Service  [facility]  Securely message with [employer] (more info)  Text page via [employer] Paging/Directory   ______________________________________________________________________    Interval History   Had agitation overnight that required Zyprexa.  Patient is off restraints today.  But is calmer.  We will decrease Sinemet to 5-50.      Physical Exam   Vital Signs: Temp: 97.6  F (36.4  C) Temp src: Axillary BP: 129/76 Pulse: 62   Resp: 19 SpO2: 100 % O2 Device: None (Room air)    Weight: 190 lbs 0 oz  , But does not follow directions.  Eating breakfast    Medical Decision Making       35 MINUTES SPENT BY ME on the date of service doing chart review, history, exam, documentation & further activities per the note.  MANAGEMENT DISCUSSED with the following over the past 24 hours: RN       Data

## 2023-10-02 NOTE — SIGNIFICANT EVENT
Significant Event Note    Time of event: 1:12 AM October 2, 2023    Description of event:  88-year-old with severe dementia with behavioral disturbance who lives in a senior memory care unit at [place] was brought in due to recurrent falls.  He sustained acute on chronic C7 and acute on chronic T3 fracture.  Neurosurgery was consulted and recommended conservative treatment.  Patient's behavior is not controlled with his home medications.  He is requiring one-to-one sitter and nonpharmacological measures are unable to control his behavior.  Given recurrent falls and ongoing issues with behavior prognosis is guarded.  Patient is much calmer on 10/1.  Will restart Sinemet at a lower dose     Called to because patient had increasing agitation and behaviors.  He removed his C-collar and was pulling at lines and tubes.    I did examine the patient and he is an appropriate patient for restraints    Plan:  Ordered soft restraints for patient safety  Ordered 5mg zyprexa    Discussed with: bedside nurse    Biju Espinoza MD

## 2023-10-02 NOTE — PLAN OF CARE
Problem: Plan of Care - These are the overarching goals to be used throughout the patient stay.    Goal: Plan of Care Review  Description: The Plan of Care Review/Shift note should be completed every shift.  The Outcome Evaluation is a brief statement about your assessment that the patient is improving, declining, or no change.  This information will be displayed automatically on your shift note.  Outcome: Progressing  Goal: Absence of Hospital-Acquired Illness or Injury  Intervention: Identify and Manage Fall Risk  Recent Flowsheet Documentation  Taken 10/1/2023 1832 by Jennifer Kingsley, RN  Safety Promotion/Fall Prevention:   activity supervised   bedside attendant  Goal: Optimal Comfort and Wellbeing  Intervention: Monitor Pain and Promote Comfort  Recent Flowsheet Documentation  Taken 10/1/2023 1806 by Jennifer Kingsley, RN  Pain Management Interventions: medication (see MAR)     Problem: Orthopaedic Fracture  Goal: Optimal Pain Control and Function  Intervention: Manage Acute Orthopaedic-Related Pain  Recent Flowsheet Documentation  Taken 10/1/2023 1806 by Jennifer Kingsley, RN  Pain Management Interventions: medication (see MAR)  Goal: Effective Oxygenation and Ventilation  Intervention: Promote Airway Secretion Clearance  Recent Flowsheet Documentation  Taken 10/1/2023 1832 by Jennifer Kingsley, RN  Cough And Deep Breathing: done with encouragement     Goal Outcome Evaluation:  Patient only alert to self. Unable to make needs known. Restless and agitated PRN seroquel given with effective interventions. 1:1 continues for safety.

## 2023-10-02 NOTE — PROGRESS NOTES
Care Management Follow Up    Length of Stay (days): 1    Expected Discharge Date: 10/03/2023     Concerns to be Addressed:     Discharge planning  Patient plan of care discussed at interdisciplinary rounds: Yes    Anticipated Discharge Disposition:  24 hour care     Anticipated Discharge Services:  PT OT Nursing  Anticipated Discharge DME:  walker    Patient/family educated on Medicare website which has current facility and service quality ratings:    Education Provided on the Discharge Plan:  yes  Patient/Family in Agreement with the Plan:  yes    Referrals Placed by CM/SW:    Private pay costs discussed: private room/amenity fees    Additional Information:  SW met with patient's wife Aparna in room. Patient on 1:1 with restraints.  Wife is scheduled to go to [facility] today to pay month lease however understands patient will not be able to return and plans to not pay the full month.   Discussed placement, at this time therapy recommends long term care, patient is assist of 1 at baseline. He will likely need a locked and secured unit at discharge.    Neurosurgery recommends cervical collar at all times, light activity     ANDREAS Nassar

## 2023-10-02 NOTE — PROGRESS NOTES
1515 updated Dr. Marte not able to give 1400 carbidopa and 1500 seroquel - patient sleeping - will continue to offer    1445 patient is dozing - have not been able to give scheduled seroquel or carbidopa - will continue to try and give.  Did not sleep all night - so dozing throughout day.    1300- patient more compliant to take his meds - was able to take tylenol and oral dilaudid.  Released left hand from restraint.  Will continue to monitor    1140-patient sleeping - restraints remain in place--1:1 in room , wife in room and states that patient is usually better behaved during day.    Nurse attempted to give oral xyprexa crushed in applesauce - he spit it out - not sure how much was ingested

## 2023-10-02 NOTE — PLAN OF CARE
Problem: Plan of Care - These are the overarching goals to be used throughout the patient stay.    Goal: Plan of Care Review  Description: The Plan of Care Review/Shift note should be completed every shift.  The Outcome Evaluation is a brief statement about your assessment that the patient is improving, declining, or no change.  This information will be displayed automatically on your shift note.  Outcome: Not Progressing   Goal Outcome Evaluation:    Patient AO to self only overnight, 1:1 for safety. Was aggressive and combative to staff and was given scheduled PO and prn IM meds with minimal effect on aggression. Soft restraints placed on bilateral upper extremities after patient removed IV and cervical collar, resident came for face to face. New IV placed and was given one time dose prn IV zyprexa with little effect. Patient has not slept, continues to call out, fidget, remove brief/bedding.     Haydee Fry RN

## 2023-10-03 NOTE — PLAN OF CARE
Problem: Behavioral Health Comorbidity  Goal: Maintenance of Behavioral Health Symptom Control  Outcome: Progressing     Problem: Violence Risk or Actual  Goal: Anger and Impulse Control  Intervention: Minimize Safety Risk  Recent Flowsheet Documentation  Taken 10/3/2023 0730 by Manny Oneil RN  Sensory Stimulation Regulation: television on  Enhanced Safety Measures:  at bedside     Problem: Restraint, Nonviolent  Goal: Absence of Harm or Injury  Intervention: Protect Skin and Joint Integrity  Recent Flowsheet Documentation  Taken 10/3/2023 1410 by Manny Oneil RN  Body Position: (Attempted to stand next to the bed with PT but could not tolerate) other (see comments)  Taken 10/3/2023 0730 by Manny Oneil RN  Body Position: position changed independently   Goal Outcome Evaluation:       Confused pt on room air. Only alert to self. Inconsistent with commands. Both soft wrist restraints off and on standby at 1300. Calm but easily irritable. Attempted to stand at bedside with PT but unable to fully stand, only tolerated standing nursing home for a few seconds. Generalized weakness. Cervical collar brace on at all times. Hypertensive up to 170 mmHg systolic. Restless and grabbing at gown and collar brace at times when awake.

## 2023-10-03 NOTE — PROGRESS NOTES
Patient continues to remain restraint free, has been restraint free since the start of shift. Will continue to observe.

## 2023-10-03 NOTE — PLAN OF CARE
"Goal Outcome Evaluation:      Plan of Care Reviewed With: patient    Overall Patient Progress: decliningOverall Patient Progress: declining    Outcome Evaluation: Pt became agitated, restless, kicking and punching. Pulled out IV around 1900 last night, attempted to get out of bed multiple times. Left wrist tied down due to tearing of brief, grabbing, and punching. PRN Seroquel given. BP elevaed to 204/93, oral hydralazine given. Pills crushed in pudding. Brief changed. Slept between cares.    BP (!) 204/93 (BP Location: Left arm)   Pulse 61   Temp 97.6  F (36.4  C) (Oral)   Resp 18   Ht 1.93 m (6' 4\")   Wt 86.2 kg (190 lb)   SpO2 99%   BMI 23.13 kg/m      Rocky Schmidt RN    "

## 2023-10-03 NOTE — PROGRESS NOTES
[facility]    Medicine Progress Note - Hospitalist Service    Date of Admission:  9/29/2023    Assessment & Plan   88-year-old with severe dementia with behavioral disturbance who lives in a senior memory care unit at [place] was brought in due to recurrent falls.  He sustained acute on chronic C7 and acute on chronic T3 fracture.  Neurosurgery was consulted and recommended conservative treatment.  Patient's behavior is not controlled with his home medications.  He is requiring one-to-one sitter and nonpharmacological measures are unable to control his behavior.  Given recurrent falls and ongoing issues with behavior prognosis is guarded.  Patient is much calmer on 10/1.  But agitated on 10/2 and 10/3.  Continue reduced dose of Sinemet Sinemet to 5-50    S/p Fall, mechanical  Acute to subacute C7 #  Chronic T1#  Acute to subacute T3#           CT of head did not show any acute abnormality.   Spine imaging showed : uperior endplate compression fracture of the C7 vertebrae with loss of 15% of vertebral body height. Subtle high signal change within the anterior/superior margin of the C7 vertebrae suggests that this fracture is acute/subacute in nature.   Also found to have chronic compression fracture of the T1 vertebrae, acute/subacute T3 vertebrae #   Spine surgery consulted, recommend conservative management, cervical collar x 6-12 weeks time considering # is acute to subacute with follow up in 6 weeks time.    --DC Ativan since it made his behavior worse.  -- Continue p.o. scheduled hydromorphone for 4 days.  Discussed CODE STATUS code with wife : CODE STATUS changed to DNR on 9/30   Discussed with nurse practitioner Josefina on 10/2 about T3 fracture.  No specific plans regarding the 3 fracture 2.  Continue cervical collar.    Elevated BP : iv hydralazine prn     Acute encephalopathy associated with dementia and behavioral disturbance : on seroquel 12.5 mg bid, 25 mg bid prn for agitation,  ramelteon 8 mg at bedtime, trazodone 100 mg at bedtime -at home  --continue IM Zyprexa 2.5 mg twice daily as needed for agitation.    High-dose Sinemet was DC'd on 9/30.  Patient did have agitation on  of Sinemet.  As per his geriatrician plan we will continue to taper off Sinemet.  Decrease Sinemet to 5-50.   no evidence of hypoglycemia.  Neuropathy On lyrica 50 mg bid  H/o a fib  S/p pacemaker    Discussed with pt's wife on 10/3     Diet: Regular Diet Adult    DVT Prophylaxis: Low Risk/Ambulatory with no VTE prophylaxis indicated  Manzo Catheter: Not present  Lines: None     Cardiac Monitoring: None  Code Status: No CPR- Do NOT IntubateDNR    Clinically Significant Risk Factors                      # Dementia: noted on problem list                 Disposition Plan      Expected Discharge Date: 10/07/2023    Discharge Delays: 1:1 Sitter still ordered - MD to assess            Abdiaziz Marte MD  Hospitalist Service  [facility]  Securely message with [employer] (more info)  Text page via [employer] Paging/Directory   ______________________________________________________________________    Interval History   Had another bout of agitation overnight was put on restraints.  Nurse at bedside advised to take of the restraints.  Discussed with neurosurgery nurse practitioner yesterday and no specific treatment for T3 l fracture recommended      Physical Exam   Vital Signs: Temp: 97.6  F (36.4  C) Temp src: Oral BP: (!) 168/77 Pulse: 60   Resp: 18 SpO2: 99 % O2 Device: None (Room air)    Weight: 190 lbs 0 oz  , But does not follow directions.  Eating breakfast    Medical Decision Making       35 MINUTES SPENT BY ME on the date of service doing chart review, history, exam, documentation & further activities per the note.  MANAGEMENT DISCUSSED with the following over the past 24 hours: RN       Data

## 2023-10-03 NOTE — PROGRESS NOTES
Sitter pulled by charge, patient spouse is here visiting. Patient is sleeping comfortably at this time. Will continue to observe.

## 2023-10-04 NOTE — PLAN OF CARE
1:1 sitter in place for patient safety.  Pt attempting to take cervical collar off and redirection needed.    Took medication orally with room temp water.  Didn't sleep much throughout night.    Yumi Palma RN

## 2023-10-04 NOTE — PROGRESS NOTES
Patient asleep most of shift, no complaints of pain. Attempted to give PM meds crushed in pudding per pt request, pt spit back out. 1:1 continued for safety, no behaviors noted.     Haydee Fry RN

## 2023-10-04 NOTE — PROGRESS NOTES
Care Management Follow Up    Length of Stay (days): 3    Expected Discharge Date: 10/10/2023     Concerns to be Addressed:  discharge planning, care progression     Patient plan of care discussed at interdisciplinary rounds: Yes    Anticipated Discharge Disposition:  LTC     Anticipated Discharge Services:  24 hour care  Anticipated Discharge DME:  Per Therapy and Treatment Team    Patient/family educated on Medicare website which has current facility and service quality ratings:    Education Provided on the Discharge Plan:    Patient/Family in Agreement with the Plan:      Referrals Placed by CM/SW:    Private pay costs discussed:  not at this time    Additional Information:  Chart reviewed.  Per hospitalist, pt is medically ready for discharge.  Pt remains with 1:1 sitter.  Wearing cervical collar, but pt attempts to remove.    Spoke with Swapna at [facility] where pt resides in memory care.  She requested update.  She states pt's behaviors were worsening while he has been at [facility] and she confirms pt cannot return with current behaviors and with use of cervical collar.  She states they would reconsider pt's return if behaviors were under control.      PT recommend LTC placement.     Indira Bear RN

## 2023-10-04 NOTE — PROGRESS NOTES
[facility]    Medicine Progress Note - Hospitalist Service    Date of Admission:  9/29/2023    Assessment & Plan   88-year-old with severe dementia with behavioral disturbance who lives in a senior memory care unit at [place] was brought in due to recurrent falls.  He sustained acute on chronic C7 and acute on chronic T3 fracture.  Neurosurgery was consulted and recommended conservative treatment.  Patient's behavior is not controlled with his home medications.  He is requiring one-to-one sitter and nonpharmacological measures are unable to control his behavior.  Given recurrent falls and ongoing issues with behavior prognosis is guarded.  Patient is much calmer on 10/1.  But agitated on 10/2 and 10/3.  Continue reduced dose of Sinemet Sinemet to 5-50.  Awaiting placement    S/p Fall, mechanical  Acute to subacute C7 #  Chronic T1#  Acute to subacute T3#           CT of head did not show any acute abnormality.   Spine imaging showed : uperior endplate compression fracture of the C7 vertebrae with loss of 15% of vertebral body height. Subtle high signal change within the anterior/superior margin of the C7 vertebrae suggests that this fracture is acute/subacute in nature.   Also found to have chronic compression fracture of the T1 vertebrae, acute/subacute T3 vertebrae #   Spine surgery consulted, recommend conservative management, cervical collar x 6-12 weeks time considering # is acute to subacute with follow up in 6 weeks time.    --DC Ativan since it made his behavior worse.  -- Continue p.o. scheduled hydromorphone for 4 days.  Discussed CODE STATUS code with wife : CODE STATUS changed to DNR on 9/30   Discussed with nurse practitioner Josefina on 10/2 about T3 fracture.  No specific plans regarding the 3 fracture 2.  Continue cervical collar.    Elevated BP : iv hydralazine prn     Acute encephalopathy associated with dementia and behavioral disturbance : on seroquel 12.5 mg bid, 25 mg  bid prn for agitation, ramelteon 8 mg at bedtime, trazodone 100 mg at bedtime -at home  --continue IM Zyprexa 2.5 mg twice daily as needed for agitation.    High-dose Sinemet was DC'd on 9/30.  Patient did have agitation on  of Sinemet.  As per his geriatrician plan we will continue to taper off Sinemet.  Decrease Sinemet to 5-50.   no evidence of hypoglycemia.  Neuropathy On lyrica 50 mg bid  H/o a fib  S/p pacemaker    Discussed with pt's wife on 10/3     Diet: Regular Diet Adult    DVT Prophylaxis: Low Risk/Ambulatory with no VTE prophylaxis indicated  Manzo Catheter: Not present  Lines: None     Cardiac Monitoring: None  Code Status: No CPR- Do NOT IntubateDNR    Clinically Significant Risk Factors                      # Dementia: noted on problem list                 Disposition Plan      Expected Discharge Date: 10/07/2023    Discharge Delays: 1:1 Sitter still ordered - MD to assess            Abdiaziz Marte MD  Hospitalist Service  [facility]  Securely message with [employer] (more info)  Text page via [employer] Paging/Directory   ______________________________________________________________________    Interval History   Patient is sleeping.  Nursing aide at bedside.  Patient ate breakfast and then back to bed.  Had agitation overnight.  Did not require restraints.  No family member at bedside.  Review of systems cannot be obtained      Physical Exam   Vital Signs: Temp: 97.9  F (36.6  C) Temp src: Oral BP: 107/58 Pulse: 72   Resp: 18 SpO2: 97 % O2 Device: None (Room air)    Weight: 190 lbs 0 oz  Patient sleeping   No apparent distress    Medical Decision Making       35 MINUTES SPENT BY ME on the date of service doing chart review, history, exam, documentation & further activities per the note.  MANAGEMENT DISCUSSED with the following over the past 24 hours: RN       Data

## 2023-10-04 NOTE — PLAN OF CARE
Goal Outcome Evaluation:         Pt. Remains on 1:1 supervision for impulsivity and pt. Safety,  cervical collar on, incontinent of B&B, meds crushed in pudding or applesauce, ate well for breakfast requires feeding assistance, will cont to monitor.

## 2023-10-05 NOTE — PLAN OF CARE
Problem: Behavioral Health Comorbidity  Goal: Maintenance of Behavioral Health Symptom Control  Outcome: Not Progressing     Problem: Violence Risk or Actual  Goal: Anger and Impulse Control  Outcome: Progressing   Goal Outcome Evaluation:       Patient remains on 1:1 for unpredictability. Patient is very resistant to cares. Patient will grab hit and spit. Will continue to monitor.

## 2023-10-05 NOTE — PROGRESS NOTES
[facility]    PROGRESS NOTE - Hospitalist Service    Assessment and Plan  Patient is new to me, Rosemary Farmer is a 88-year-old with severe dementia with behavioral disturbance who lives in a senior memory care unit at [place] was brought in due to recurrent falls.  He sustained acute on chronic C7 and acute on chronic T3 fracture.  Neurosurgery was consulted and recommended conservative treatment.  Patient's behavior is not controlled with his home medications.  He is requiring one-to-one sitter and nonpharmacological measures are unable to control his behavior.  Given recurrent falls and ongoing issues with behavior prognosis is guarded.  Patient is much calmer on 10/1.  But agitated on 10/2 and 10/3.  Continue reduced dose of Sinemet Sinemet to 5-50.  Awaiting placement     Acute/subacute C7 and T3 fracture   Chronic T1 fracture  - S/P mechanical fall  - CT of head did not show any acute abnormality.   Spine imaging showed : uperior endplate compression fracture of the C7 vertebrae with loss of 15% of vertebral body height. Subtle high signal change within the anterior/superior margin of the C7 vertebrae suggests that this fracture is acute/subacute in nature.   Also found to have chronic compression fracture of the T1 vertebrae, acute/subacute T3 vertebrae #   Spine surgery consulted, recommend conservative management, cervical collar x 6-12 weeks time considering # is acute to subacute with follow up in 6 weeks time.    --DC Ativan since it made his behavior worse.  -- Continue p.o. scheduled hydromorphone for 4 days.  Discussed CODE STATUS code with wife : CODE STATUS changed to DNR on 9/30   Discussed with nurse practitioner Josefina on 10/2 about T3 fracture.  No specific plans regarding the 3 fracture 2.  Continue cervical collar.    Hypertension  -Uncontrolled, secondary to pain  -Improving with better pain control continue IV hydralazine.    Acute encephalopathy  -Baseline dementia  with behavioral disturbance  - on seroquel 12.5 mg bid, 25 mg bid prn for agitation, ramelteon 8 mg at bedtime, trazodone 100 mg at bedtime -at home  --continue IM Zyprexa 2.5 mg twice daily as needed for agitation.    - High-dose Sinemet was DC'd on 9/30.  Patient did have agitation on  of Sinemet.  As per his geriatrician plan we will continue to taper off Sinemet.  Decrease Sinemet to 5-50.  - no evidence of hypoglycemia.    Neuropathy   On lyrica 50 mg bid    History of a fib  - S/p pacemaker  -No anticoagulation because of fall    Weakness and deconditioning  - S/p recurrent falls  -Patient needs long-term care placement         40 MINUTES SPENT BY ME on the date of service doing chart review, history, exam, documentation & further activities per the note    Principal Problem:    Agitation  Active Problems:    Other closed nondisplaced fracture of seventh cervical vertebra, initial encounter (H)    Closed nondisplaced fracture of seventh cervical vertebra, unspecified fracture morphology, initial encounter (H)      VTE prophylaxis:  Pneumatic Compression Devices  DIET: Orders Placed This Encounter      Regular Diet Adult      Disposition/Barriers to discharge: Placement  Code Status: No CPR- Do NOT Intubate    Subjective:  Rosemary is confused, no acute significant event overnight, still on one-to-one sitter.    PHYSICAL EXAM  Vitals:    09/29/23 0321   Weight: 86.2 kg (190 lb)     B/P:142/49 T:98.6 P:69 R:18     Intake/Output Summary (Last 24 hours) at 10/5/2023 1507  Last data filed at 10/5/2023 0828  Gross per 24 hour   Intake 580 ml   Output --   Net 580 ml      Body mass index is 23.13 kg/m .    Constitutional: awake, alert, cooperative, no apparent distress, and appears stated age  Respiratory: No increased work of breathing, good air exchange, clear to auscultation bilaterally, no crackles or wheezing  Cardiovascular: Normal apical impulse, regular rate and rhythm, normal S1 and S2, no S3 or S4, and no  murmur noted  GI: No scars, normal bowel sounds, soft, non-distended, non-tender, no masses palpated, no hepatosplenomegally  Skin: no bruising or bleeding and normal skin color, texture, turgor  Musculoskeletal: There is no redness, warmth, or swelling of the joints.  Full range of motion noted.  no lower extremity pitting edema present  Neurologic: Awake, alert, oriented  to self only, moves 4 extremities.  Neuropsychiatric: No agitation      PERTINENT LABS/IMAGING:        Imaging results reviewed over the past 24 hrs:   No results found for this or any previous visit (from the past 24 hour(s)).    Discussed with patient, family, nursing staff and discharge planner    Brett Williamson MD  [facility] Medicine Service  [phone]

## 2023-10-05 NOTE — PROVIDER NOTIFICATION
Texted Dr Williamson:     PT saw pt about 30 min ago and told me that Rosemary was leaning to his right while dangled & then his eyes had nystagmus. He does c/o feeling a little dizzy now.

## 2023-10-05 NOTE — PROGRESS NOTES
"Care Management Follow Up    Length of Stay (days): 4    Expected Discharge Date: 10/10/2023    Concerns to be Addressed:   Discharge disposition; Requiring 1:1 supervision and restraints.   Patient plan of care discussed at interdisciplinary rounds: Yes    Anticipated Discharge Disposition:  [facility] assisted living ([facility])     Anticipated Discharge Services:  Assist with all instrumental activities of daily living (IADLs) and assist with activities of daily living as needed.   Anticipated Discharge DME:  Per therapy (if indicated).    Patient/family educated on Medicare website which has current facility and service quality ratings:   NA  Education Provided on the Discharge Plan:   Per team  Patient/Family in Agreement with the Plan:   Yes    Referrals Placed by CM/SW:   See previous notes.   Private pay costs discussed: Not applicable at this time.     Additional Information:  Patient is a resident of [facility] [place] but cannot return there. He will require a higher level of care due to his behaviors. Per nursing notes, \"Patient remains on 1:1 for unpredictability. Patient is very resistant to cares. Patient will grab hit and spit. Will continue to monitor.\"    Filomena Leiva RN  "

## 2023-10-06 NOTE — PLAN OF CARE
Problem: Behavioral Health Comorbidity  Goal: Maintenance of Behavioral Health Symptom Control  Outcome: Progressing  Intervention: Maintain Behavioral Health Symptom Control  Recent Flowsheet Documentation  Taken 10/6/2023 0053 by Annel Tafoya RN  Medication Review/Management: medications reviewed     Problem: Violence Risk or Actual  Goal: Anger and Impulse Control  Outcome: Progressing  Intervention: Minimize Safety Risk  Recent Flowsheet Documentation  Taken 10/6/2023 0053 by Annel Tafoya RN  Sensory Stimulation Regulation:   care clustered   lighting decreased   quiet environment promoted  Enhanced Safety Measures: room near unit station   Goal Outcome Evaluation:       Pt alert and oriented to self. Denies pain this shift. Cervical collar in place. Pt slept most of shift overnight. Incontinent of B&B, no BM overnight.Was cooperative w/ incontinent cares. Lidocaine patch remains on back. 1:1 bedside sitter.

## 2023-10-06 NOTE — PROGRESS NOTES
Care Management Follow Up    Length of Stay (days): 5    Expected Discharge Date: 10/10/2023     Concerns to be Addressed:     placement barrier/planning/safety  Patient plan of care discussed at interdisciplinary rounds: Yes    Anticipated Discharge Disposition:  memory care     Anticipated Discharge Services:  memory care  Anticipated Discharge DME:  neck brace    Patient/family educated on Medicare website which has current facility and service quality ratings:  yes  Education Provided on the Discharge Plan:  yes  Patient/Family in Agreement with the Plan:  Wife is overwhelmed trying to keep placement for . Supportive but wonders if transitional care would offer more support    Referrals Placed by CM/SW:  [facility], [facility], [facility]  Private pay costs discussed: private room/amenity fees and transportation costs    Additional Information:  Previously assessed. Spoke with Wife Aparna who is selling their house to care for pts care. She reports that she is a bit overwhelmed with memory care not offering enough support but recommendations of LTC vs TCU. She would like to know the reasoning behind LTC vs TCU. Note to PT for more information.   Aparna reports that pt has been private pay at $8,200 a month and $7,600 at previous facilities. Aparna would like the placement as near [facility] as were able to locate. She would like phone numbers to tour prior to placement    Venus Wadsworth, [facility]  3:01 PM   10/06/23

## 2023-10-06 NOTE — PLAN OF CARE
Problem: Plan of Care - These are the overarching goals to be used throughout the patient stay.    Goal: Absence of Hospital-Acquired Illness or Injury  Outcome: Progressing  Intervention: Identify and Manage Fall Risk  Recent Flowsheet Documentation  Taken 10/5/2023 1630 by Filomena Ruiz RN  Safety Promotion/Fall Prevention:   bedside attendant   activity supervised   room near nurse's station   room organization consistent  Intervention: Prevent Skin Injury  Recent Flowsheet Documentation  Taken 10/5/2023 2100 by Filomena Ruiz RN  Body Position:   weight shifting   supine  Taken 10/5/2023 1658 by Filomena Ruiz RN  Body Position:   turned   right  Taken 10/5/2023 1615 by Filomena Ruiz RN  Body Position: supine  Goal: Readiness for Transition of Care  Outcome: Progressing     Problem: Behavioral Health Comorbidity  Goal: Maintenance of Behavioral Health Symptom Control  Outcome: Progressing  Intervention: Maintain Behavioral Health Symptom Control  Recent Flowsheet Documentation  Taken 10/5/2023 1630 by Filomena Ruiz RN  Medication Review/Management: medications reviewed     Problem: Orthopaedic Fracture  Goal: Absence of Bleeding  Outcome: Progressing  Goal: Effective Bowel Elimination  Outcome: Progressing  Goal: Absence of Embolism Signs and Symptoms  Outcome: Progressing  Goal: Optimal Pain Control and Function  Outcome: Progressing  Goal: Effective Oxygenation and Ventilation  Outcome: Progressing  Intervention: Promote Airway Secretion Clearance  Recent Flowsheet Documentation  Taken 10/5/2023 2100 by Filomena Ruiz RN  Activity Management:   activity adjusted per tolerance   sitting, edge of bed   standing at bedside  Taken 10/5/2023 1615 by Filomena Ruiz RN  Activity Management:   activity adjusted per tolerance   bedrest  Intervention: Optimize Oxygenation and Ventilation  Recent Flowsheet Documentation  Taken 10/5/2023 2100 by Filomena Ruiz RN  Head of Bed (HOB) Positioning: HOB at 15  degrees  Taken 10/5/2023 1658 by Filomena Ruiz RN  Head of Bed (HOB) Positioning: HOB at 15 degrees  Taken 10/5/2023 1615 by Filomena Ruiz RN  Head of Bed (HOB) Positioning: HOB at 20-30 degrees     Problem: Violence Risk or Actual  Goal: Anger and Impulse Control  Outcome: Progressing  Intervention: Minimize Safety Risk  Recent Flowsheet Documentation  Taken 10/5/2023 1630 by Filomena Ruiz RN  Sensory Stimulation Regulation:   television on   quiet environment promoted  Enhanced Safety Measures: room near unit station     Problem: Risk for Delirium  Goal: Improved Behavioral Control  Outcome: Progressing  Intervention: Prevent and Manage Agitation  Recent Flowsheet Documentation  Taken 10/5/2023 1630 by Filomena Ruiz RN  Environment Familiarity/Consistency: daily routine followed  Intervention: Minimize Safety Risk  Recent Flowsheet Documentation  Taken 10/5/2023 1630 by Filomena Ruiz RN  Enhanced Safety Measures: room near unit station  Trust Relationship/Rapport:   care explained   emotional support provided   reassurance provided   thoughts/feelings acknowledged  Goal: Improved Sleep  Outcome: Progressing     Problem: Seclusion/Restraint, Behavioral  Goal: Absence of Harm or Injury  Outcome: Progressing  Intervention: Protect Dignity, Rights, and Personal Wellbeing  Recent Flowsheet Documentation  Taken 10/5/2023 1630 by Filomena Ruiz RN  Trust Relationship/Rapport:   care explained   emotional support provided   reassurance provided   thoughts/feelings acknowledged  Intervention: Protect Skin and Joint Integrity  Recent Flowsheet Documentation  Taken 10/5/2023 2100 by Filomena Ruiz RN  Body Position:   weight shifting   supine  Taken 10/5/2023 1658 by Filomena Ruiz RN  Body Position:   turned   right  Taken 10/5/2023 1615 by Filomena Ruiz RN  Body Position: supine   Goal Outcome Evaluation:         Writer was pt's 1/1 for first half of shift. Pt's wife was at bedside until approx 1715. Writer  paged Dr Williamson at beginning of shift w/ pt's vitals per his request. Pt incont of urine x1. Pt ate all of supper, drank water, coffee and cranberry juice. Took most pills crushed in pudding or ice cream. Took a few pills whole with water or juice. Pt calm first few hrs of shift. At approx 2030, pt became somewhat restless, sat up on edge of bed, took him awhile but wanted to do it himself. Then was attempting to stand several times, finally able to stand very briefly, a few times, w/ 2 staff assist. Pt needed encouragement to lie back down, finally complied and went to sleep right away. Pt remains on 1/1 supervision. Denied pain this shift and no s/s of pain noted. Pt remains asleep at this time.

## 2023-10-06 NOTE — PROGRESS NOTES
[facility]    PROGRESS NOTE - Hospitalist Service    Assessment and Plan  88-year-old with severe dementia with behavioral disturbance who lives in a senior memory care unit at [place] was brought in due to recurrent falls.  He sustained acute on chronic C7 and acute on chronic T3 fracture.  Neurosurgery was consulted and recommended conservative treatment.  Patient's behavior is not controlled with his home medications.  He is requiring one-to-one sitter and nonpharmacological measures are unable to control his behavior.  Given recurrent falls and ongoing issues with behavior prognosis is guarded.  Patient is much calmer on 10/1.  But agitated on 10/2 and 10/3.  Continue reduced dose of Sinemet Sinemet to 5-50.  Awaiting placement      Acute/subacute C7 and T3 fracture   Chronic T1 fracture  - S/P mechanical fall  - CT of head did not show any acute abnormality.   Spine imaging showed : uperior endplate compression fracture of the C7 vertebrae with loss of 15% of vertebral body height. Subtle high signal change within the anterior/superior margin of the C7 vertebrae suggests that this fracture is acute/subacute in nature.   Also found to have chronic compression fracture of the T1 vertebrae, acute/subacute T3 vertebrae #   Spine surgery consulted, recommend conservative management, cervical collar x 6-12 weeks time considering # is acute to subacute with follow up in 6 weeks time.    --DC Ativan since it made his behavior worse.  -- Continue p.o. scheduled hydromorphone for 4 days.  Discussed CODE STATUS code with wife : CODE STATUS changed to DNR on 9/30   Discussed with nurse practitioner Josefina on 10/2 about T3 fracture.  No specific plans regarding the 3 fracture 2.  Continue cervical collar.     Hypertension  -Uncontrolled, secondary to pain  -Improving with better pain control continue IV hydralazine.  -Start low-dose Norvasc.     Acute encephalopathy  -Baseline dementia with  behavioral disturbance  - on seroquel 12.5 mg bid, 25 mg bid prn for agitation, ramelteon 8 mg at bedtime, trazodone 100 mg at bedtime -at home  --continue IM Zyprexa 2.5 mg twice daily as needed for agitation.    - High-dose Sinemet was DC'd on 9/30.  Patient did have agitation on  of Sinemet.  As per his geriatrician plan we will continue to taper off Sinemet.    - Decrease Sinemet to 5-50 on 10/2/23.  - no evidence of hypoglycemia.     Neuropathy   - On lyrica 50 mg bid     History of a fib  - S/p pacemaker  - No anticoagulation because of fall     Weakness and deconditioning  - S/p recurrent falls  - Patient needs long-term care placement        30 MINUTES SPENT BY ME on the date of service doing chart review, history, exam, documentation & further activities per the note    Principal Problem:    Agitation  Active Problems:    Other closed nondisplaced fracture of seventh cervical vertebra, initial encounter (H)    Closed nondisplaced fracture of seventh cervical vertebra, unspecified fracture morphology, initial encounter (H)      VTE prophylaxis:  Enoxaparin (Lovenox) SQ  DIET: Orders Placed This Encounter      Regular Diet Adult      Disposition/Barriers to discharge: Placement  Code Status: No CPR- Do NOT Intubate    Subjective:  Rosemary is a pleasant confused, denies any chest pain or shortness of breath, no acute significant events overnight.   still on one-to-one sitter    PHYSICAL EXAM  Vitals:    09/29/23 0321   Weight: 86.2 kg (190 lb)     B/P:178/78 T:98 P:63 R:20     Intake/Output Summary (Last 24 hours) at 10/6/2023 1431  Last data filed at 10/6/2023 0756  Gross per 24 hour   Intake 710 ml   Output --   Net 710 ml      Body mass index is 23.13 kg/m .    Constitutional: awake, alert, cooperative, no apparent distress, and appears stated age  Respiratory: No increased work of breathing, good air exchange, clear to auscultation bilaterally, no crackles or wheezing  Cardiovascular: Normal apical  impulse, regular rate and rhythm, normal S1 and S2, no S3 or S4, and no murmur noted  GI: No scars, normal bowel sounds, soft, non-distended, non-tender, no masses palpated, no hepatosplenomegally  Skin: no bruising or bleeding and normal skin color, texture, turgor  Musculoskeletal: There is no redness, warmth, or swelling of the joints.  Full range of motion noted.  no lower extremity pitting edema present  Neurologic: Awake, alert, oriented to self only.  Neuropsychiatric: Appropriate with examiner      PERTINENT LABS/IMAGING:    I have personally reviewed the following data over the past 24 hrs:    9.1  \   13.2 (L)   / 237     141 105 38.2 (H) /  99   4.2 28 0.88 \       Imaging results reviewed over the past 24 hrs:   No results found for this or any previous visit (from the past 24 hour(s)).    Discussed with patient, family, nursing staff and discharge planner    Brett Williamson MD  [facility]  [phone]

## 2023-10-06 NOTE — PLAN OF CARE
Problem: Plan of Care - These are the overarching goals to be used throughout the patient stay.    Goal: Absence of Hospital-Acquired Illness or Injury  Intervention: Identify and Manage Fall Risk  Recent Flowsheet Documentation  Taken 10/6/2023 0805 by Zina De Paz RN  Safety Promotion/Fall Prevention: activity supervised     Problem: Behavioral Health Comorbidity  Goal: Maintenance of Behavioral Health Symptom Control  Intervention: Maintain Behavioral Health Symptom Control  Recent Flowsheet Documentation  Taken 10/6/2023 0805 by Zina De Paz RN  Medication Review/Management: medications reviewed     Problem: Violence Risk or Actual  Goal: Anger and Impulse Control  Intervention: Minimize Safety Risk  Recent Flowsheet Documentation  Taken 10/6/2023 0805 by Zina De Paz RN  Enhanced Safety Measures: room near unit station     Problem: Risk for Delirium  Goal: Improved Behavioral Control  Intervention: Minimize Safety Risk  Recent Flowsheet Documentation  Taken 10/6/2023 0805 by Zina De Paz RN  Enhanced Safety Measures: room near unit station  Trust Relationship/Rapport:   care explained   choices provided   Goal Outcome Evaluation:       Patient alert and oriented to self, denied pain. PAINAD score 0. Was cooperative with meds and cares for breakfast, but at lunch he stated having delusions. Stated staff was attempting to poison him only had about 25% of lunch, declined Tylenol. Wife at bedside.

## 2023-10-06 NOTE — PLAN OF CARE
Goal Outcome Evaluation:       Denies pain. Neck orthotic in place. Pt intermittently confused and makes illogical statements. Pt restless in bed. 1:1 in place for safety. Incontinent and brief changed. Pt ate 100% for dinner. No IV access.

## 2023-10-07 NOTE — PROGRESS NOTES
Patient remain 1 to 1 with staff, restless and agitated. Scheduled seroquel available as well as PRN but patient is spitting out medication. Alert to self.  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

## 2023-10-07 NOTE — PROGRESS NOTES
[facility]    PROGRESS NOTE - Hospitalist Service    Assessment and Plan  88-year-old with severe dementia with behavioral disturbance who lives in a senior memory care unit at [place] was brought in due to recurrent falls.  He sustained acute on chronic C7 and acute on chronic T3 fracture.  Neurosurgery was consulted and recommended conservative treatment.  Patient's behavior is not controlled with his home medications.  He is requiring one-to-one sitter and nonpharmacological measures are unable to control his behavior.  Given recurrent falls and ongoing issues with behavior prognosis is guarded.  Patient is much calmer on 10/1.  But agitated on 10/2 and 10/3.  Continue reduced dose of Sinemet Sinemet to 5-50.  Awaiting placement      Acute/subacute C7 and T3 fracture   Chronic T1 fracture  - S/P mechanical fall  - CT of head did not show any acute abnormality.   Spine imaging showed : uperior endplate compression fracture of the C7 vertebrae with loss of 15% of vertebral body height. Subtle high signal change within the anterior/superior margin of the C7 vertebrae suggests that this fracture is acute/subacute in nature.   Also found to have chronic compression fracture of the T1 vertebrae, acute/subacute T3 vertebrae #   Spine surgery consulted, recommend conservative management, cervical collar x 6-12 weeks time considering # is acute to subacute with follow up in 6 weeks time.    --DC Ativan since it made his behavior worse.  -- Continue p.o. scheduled hydromorphone for 4 days.  Discussed CODE STATUS code with wife : CODE STATUS changed to DNR on 9/30   Discussed with nurse practitioner Josefina on 10/2 about T3 fracture.  No specific plans regarding the 3 fracture 2.  Continue cervical collar.     Hypertension  - Uncontrolled, secondary to pain  -Improving with better pain control continue IV hydralazine.  -Start low-dose Norvasc, increase dose to twice daily     Acute  encephalopathy  -Baseline dementia with behavioral disturbance  - on seroquel 12.5 mg bid, 25 mg bid prn for agitation, ramelteon 8 mg at bedtime, trazodone 100 mg at bedtime -at home  --continue IM Zyprexa 2.5 mg twice daily as needed for agitation.    - High-dose Sinemet was DC'd on 9/30.  Patient did have agitation on  of Sinemet.  As per his geriatrician plan we will continue to taper off Sinemet.    - Decrease Sinemet to 5-50 on 10/2/23.  - no evidence of hypoglycemia.     Neuropathy   - On lyrica 50 mg bid     History of a fib  - S/p pacemaker  - No anticoagulation because of fall     Weakness and deconditioning  - S/p recurrent falls  - Patient needs long-term care placement    30 MINUTES SPENT BY ME on the date of service doing chart review, history, exam, documentation & further activities per the note    Principal Problem:    Agitation  Active Problems:    Other closed nondisplaced fracture of seventh cervical vertebra, initial encounter (H)    Closed nondisplaced fracture of seventh cervical vertebra, unspecified fracture morphology, initial encounter (H)      VTE prophylaxis:  Enoxaparin (Lovenox) SQ  DIET: Orders Placed This Encounter      Regular Diet Adult      Disposition/Barriers to discharge: Placement  Code Status: No CPR- Do NOT Intubate    Subjective:  Rosemary is feeling about the same today, no acute significant events overnight, still on one-to-one sitter.  Poor oral intake last night.    PHYSICAL EXAM  Vitals:    09/29/23 0321   Weight: 86.2 kg (190 lb)     B/P:184/80 T:98.5 P:71 R:16   No intake or output data in the 24 hours ending 10/07/23 1525   Body mass index is 23.13 kg/m .    Constitutional: awake, alert, cooperative, no apparent distress, and appears stated age  Respiratory: No increased work of breathing, good air exchange, clear to auscultation bilaterally, no crackles or wheezing  Cardiovascular: Normal apical impulse, regular rate and rhythm, normal S1 and S2, no S3 or S4, and  no murmur noted  GI: No scars, normal bowel sounds, soft, non-distended, non-tender, no masses palpated, no hepatosplenomegally  Skin: no bruising or bleeding and normal skin color, texture, turgor  Musculoskeletal: There is no redness, warmth, or swelling of the joints.  Full range of motion noted.  no lower extremity pitting edema present  Neurologic: Awake, alert, oriented to self only.  Neuropsychiatric: Appropriate with examiner      PERTINENT LABS/IMAGING:        Imaging results reviewed over the past 24 hrs:   No results found for this or any previous visit (from the past 24 hour(s)).    Discussed with patient, family, nursing staff and discharge planner    Brett Williamson MD  [facility]  [phone]   Rehabilitation services/Observation and assessment

## 2023-10-07 NOTE — PROGRESS NOTES
Patient continues to be restless and agitated, attempting multiple times to get out of bed and take off cervical collar.

## 2023-10-07 NOTE — PROGRESS NOTES
Assumed care for this patient at this time and on this date. Patient continues on 1 to 1 with sitter for safety. Patient appears restless at this time.

## 2023-10-07 NOTE — PROGRESS NOTES
Care Management Follow Up    Length of Stay (days): 6    Expected Discharge Date: 10/10/2023     Concerns to be Addressed:     discharge planning  Patient plan of care discussed at interdisciplinary rounds: Yes    Anticipated Discharge Disposition:  transitional care     Anticipated Discharge Services:  24 hour care  Anticipated Discharge DME:  [facility]    Patient/family educated on Medicare website which has current facility and service quality ratings:    Education Provided on the Discharge Plan:  yes  Patient/Family in Agreement with the Plan:  yes    Referrals Placed by CM/SW:    Private pay costs discussed: Not applicable    Additional Information:  Chart reviewed.   CM met with patient's wife in patient's room. Wife requested referral be sent to [facility] in [facility] ( [address] She reports patient had been previously accepted at this facility. Wife reports she left a VM for contact Ita Samson [phone].     Marybeth Swanson, SHASHANKSW

## 2023-10-07 NOTE — PLAN OF CARE
Problem: Plan of Care - These are the overarching goals to be used throughout the patient stay.    Goal: Absence of Hospital-Acquired Illness or Injury  Intervention: Identify and Manage Fall Risk  Recent Flowsheet Documentation  Taken 10/7/2023 0042 by Roger Villegas RN  Safety Promotion/Fall Prevention: activity supervised     Problem: Behavioral Health Comorbidity  Goal: Maintenance of Behavioral Health Symptom Control  Intervention: Maintain Behavioral Health Symptom Control  Recent Flowsheet Documentation  Taken 10/7/2023 0042 by Roger Villegas RN  Medication Review/Management: medications reviewed     Problem: Violence Risk or Actual  Goal: Anger and Impulse Control  Intervention: Minimize Safety Risk  Recent Flowsheet Documentation  Taken 10/7/2023 0042 by Roger Villegas RN  Enhanced Safety Measures: room near unit station     Problem: Risk for Delirium  Goal: Improved Behavioral Control  Intervention: Minimize Safety Risk  Recent Flowsheet Documentation  Taken 10/7/2023 0042 by Roger Villegas RN  Enhanced Safety Measures: room near unit station   Goal Outcome Evaluation:       Pt disoriented to place, time, and situation. Combative cares. Restless all night. Prn Seroquel given with no effect. Cervical collar in place. 1:1 sitter continued.

## 2023-10-08 NOTE — PLAN OF CARE
Problem: Plan of Care - These are the overarching goals to be used throughout the patient stay.    Goal: Absence of Hospital-Acquired Illness or Injury  Intervention: Identify and Manage Fall Risk  Recent Flowsheet Documentation  Taken 10/7/2023 1600 by Zina De Paz RN  Safety Promotion/Fall Prevention: activity supervised  Taken 10/7/2023 0925 by Zina De Paz RN  Safety Promotion/Fall Prevention: activity supervised     Problem: Plan of Care - These are the overarching goals to be used throughout the patient stay.    Goal: Optimal Comfort and Wellbeing  Intervention: Provide Person-Centered Care  Recent Flowsheet Documentation  Taken 10/7/2023 1600 by Zina De Paz RN  Trust Relationship/Rapport:   care explained   choices provided  Taken 10/7/2023 0925 by Zina De Paz RN  Trust Relationship/Rapport:   care explained   choices provided     Problem: Behavioral Health Comorbidity  Goal: Maintenance of Behavioral Health Symptom Control  Intervention: Maintain Behavioral Health Symptom Control  Recent Flowsheet Documentation  Taken 10/7/2023 1600 by Zina De Paz RN  Medication Review/Management: medications reviewed  Taken 10/7/2023 0925 by Zina De Paz RN  Medication Review/Management: medications reviewed     Problem: Risk for Delirium  Goal: Improved Behavioral Control  Intervention: Prevent and Manage Agitation  Recent Flowsheet Documentation  Taken 10/7/2023 1600 by Zina De Paz RN  Environment Familiarity/Consistency: daily routine followed  Taken 10/7/2023 0925 by Zina De Paz RN  Environment Familiarity/Consistency: daily routine followed   Goal Outcome Evaluation:

## 2023-10-08 NOTE — PROGRESS NOTES
[facility]    PROGRESS NOTE - Hospitalist Service    Assessment and Plan  88-year-old with severe dementia with behavioral disturbance who lives in a senior memory care unit at [place] was brought in due to recurrent falls.  He sustained acute on chronic C7 and acute on chronic T3 fracture.  Neurosurgery was consulted and recommended conservative treatment.  Patient's behavior is not controlled with his home medications.  He is requiring one-to-one sitter and nonpharmacological measures are unable to control his behavior.  Given recurrent falls and ongoing issues with behavior prognosis is guarded.  Patient is much calmer on 10/1.  But agitated on 10/2 and 10/3.  Continue reduced dose of Sinemet Sinemet to 5-50.  Awaiting placement      Acute/subacute C7 and T3 fracture   Chronic T1 fracture  - S/P mechanical fall  - CT of head did not show any acute abnormality.   Spine imaging showed : uperior endplate compression fracture of the C7 vertebrae with loss of 15% of vertebral body height. Subtle high signal change within the anterior/superior margin of the C7 vertebrae suggests that this fracture is acute/subacute in nature.   Also found to have chronic compression fracture of the T1 vertebrae, acute/subacute T3 vertebrae #   Spine surgery consulted, recommend conservative management, cervical collar x 6-12 weeks time considering # is acute to subacute with follow up in 6 weeks time.    --DC Ativan since it made his behavior worse.  -- Continue p.o. scheduled hydromorphone for 4 days.  Discussed CODE STATUS code with wife : CODE STATUS changed to DNR on 9/30   Discussed with nurse practitioner Josefina on 10/2 about T3 fracture.  No specific plans regarding the 3 fracture 2.  Continue cervical collar.     Hypertension  - Uncontrolled, secondary to pain  -Improving with better pain control continue IV hydralazine.  -Start low-dose Norvasc, increase dose to twice daily     Acute  encephalopathy  -Baseline dementia with behavioral disturbance  - on seroquel 12.5 mg bid, 25 mg bid prn for agitation, ramelteon 8 mg at bedtime, trazodone 100 mg at bedtime -at home  --continue IM Zyprexa 2.5 mg twice daily as needed for agitation.    - High-dose Sinemet was DC'd on 9/30.  Patient did have agitation on  of Sinemet.  As per his geriatrician plan we will continue to taper off Sinemet.    - Decrease Sinemet to 5-50 on 10/2/23.  - no evidence of hypoglycemia.     Neuropathy   - On lyrica 50 mg bid     History of a fib  - S/p pacemaker  - No anticoagulation because of fall     Weakness and deconditioning  - S/p recurrent falls  - Patient needs long-term care placement    30 MINUTES SPENT BY ME on the date of service doing chart review, history, exam, documentation & further activities per the note    Principal Problem:    Agitation  Active Problems:    Other closed nondisplaced fracture of seventh cervical vertebra, initial encounter (H)    Closed nondisplaced fracture of seventh cervical vertebra, unspecified fracture morphology, initial encounter (H)      VTE prophylaxis:  Enoxaparin (Lovenox) SQ  DIET: Orders Placed This Encounter      Regular Diet Adult      Disposition/Barriers to discharge: Placement  Code Status: No CPR- Do NOT Intubate    Subjective:  Rosemary is better today, more oral intake, no acute significant events overnight, still on one-to-one sitter.    PHYSICAL EXAM  Vitals:    09/29/23 0321   Weight: 86.2 kg (190 lb)     B/P:133/61 T:97.3 P:66 R:20     Intake/Output Summary (Last 24 hours) at 10/8/2023 1533  Last data filed at 10/8/2023 1146  Gross per 24 hour   Intake 420 ml   Output --   Net 420 ml      Body mass index is 23.13 kg/m .    Constitutional: awake, alert, cooperative, no apparent distress, and appears stated age, wearing neck collar  Respiratory: No increased work of breathing, good air exchange, clear to auscultation bilaterally, no crackles or  wheezing  Cardiovascular: Normal apical impulse, regular rate and rhythm, normal S1 and S2, no S3 or S4, and no murmur noted  GI: No scars, normal bowel sounds, soft, non-distended, non-tender, no masses palpated, no hepatosplenomegally  Skin: no bruising or bleeding and normal skin color, texture, turgor  Musculoskeletal: There is no redness, warmth, or swelling of the joints.  Full range of motion noted.  no lower extremity pitting edema present  Neurologic: Self oriented only, pleasant confused.  Neuropsychiatric: Appropriate with examiner      PERTINENT LABS/IMAGING:        Imaging results reviewed over the past 24 hrs:   No results found for this or any previous visit (from the past 24 hour(s)).    Discussed with nursing staff and discharge planner    Brett Williamson MD  [facility] Medicine Service  [phone]

## 2023-10-08 NOTE — PROGRESS NOTES
Assumed care for this patient at this time and on this date. Patient is lying in bed with eyes closed (sleeping). Cervical collar on.

## 2023-10-08 NOTE — PLAN OF CARE
Problem: Risk for Delirium  Goal: Optimal Coping  Outcome: Progressing   Goal Outcome Evaluation:  Pt slept most of the shift. No behavioral event, 1:1 sitter at bedside for safety.

## 2023-10-09 NOTE — PROGRESS NOTES
[facility]    PROGRESS NOTE - Hospitalist Service    Assessment and Plan  88-year-old with severe dementia with behavioral disturbance who lives in a senior memory care unit at [place] was brought in due to recurrent falls.  He sustained acute on chronic C7 and acute on chronic T3 fracture.  Neurosurgery was consulted and recommended conservative treatment.  Patient's behavior is not controlled with his home medications.  He is requiring one-to-one sitter and nonpharmacological measures are unable to control his behavior.  Given recurrent falls and ongoing issues with behavior prognosis is guarded.  Patient is much calmer but gets agitated occasionally. Continue to reduced dose of Sinemet Sinemet to 5-50 over few weeks.  Awaiting placement      Acute/subacute C7 and T3 fracture   Chronic T1 fracture  - S/P mechanical fall  - CT of head did not show any acute abnormality.   Spine imaging showed : uperior endplate compression fracture of the C7 vertebrae with loss of 15% of vertebral body height. Subtle high signal change within the anterior/superior margin of the C7 vertebrae suggests that this fracture is acute/subacute in nature.   Also found to have chronic compression fracture of the T1 vertebrae, acute/subacute T3 vertebrae #   Spine surgery consulted, recommend conservative management, cervical collar x 6-12 weeks time considering # is acute to subacute with follow up in 6 weeks time.    - D/C Ativan since it made his behavior worse.  - Continue p.o. scheduled hydromorphone for 4 days.  -Discussed CODE STATUS code with wife :   - CODE STATUS changed to DNR on 9/30   - Discussed with nurse practitioner Josefina on 10/2 about T3 fracture.    - No specific plans regarding the 3 fracture 2. - Continue cervical collar.     Hypertension  - Uncontrolled, secondary to pain  -Improving with better pain control continue IV hydralazine.  -Start low-dose Norvasc, increase dose to twice daily      Acute encephalopathy  -Baseline dementia with behavioral disturbance  - on seroquel 12.5 mg bid, 25 mg bid prn for agitation, ramelteon 8 mg at bedtime, trazodone 100 mg at bedtime -at home  --continue IM Zyprexa 2.5 mg twice daily as needed for agitation.    - High-dose Sinemet was DC'd on 9/30.  Patient did have agitation on  of Sinemet.  As per his geriatrician plan we will continue to taper off Sinemet.    - Decrease Sinemet to 5-50 on 10/2/23.  Plan to do this more than 2 weeks, discussed with pharmacist  - no evidence of hypoglycemia.     Neuropathy   - On lyrica 50 mg bid     History of a fib  - S/p pacemaker  - No anticoagulation because of fall     Weakness and deconditioning  - S/p recurrent falls  - Patient needs long-term care placement     30 MINUTES SPENT BY ME on the date of service doing chart review, history, exam, documentation & further activities per the note    Principal Problem:    Agitation  Active Problems:    Other closed nondisplaced fracture of seventh cervical vertebra, initial encounter (H)    Closed nondisplaced fracture of seventh cervical vertebra, unspecified fracture morphology, initial encounter (H)      VTE prophylaxis:  Enoxaparin (Lovenox) SQ  DIET: Orders Placed This Encounter      Regular Diet Adult      Disposition/Barriers to discharge: Placement  Code Status: No CPR- Do NOT Intubate    Subjective:  Rosemary is pleasant confused today,  had 1 agitation episode last night that required IM Zyprexa.  Very cooperative this morning, still on one-to-one sitter for safety.    PHYSICAL EXAM  Vitals:    09/29/23 0321   Weight: 86.2 kg (190 lb)     B/P:110/52 T:97.6 P:65 R:21     Intake/Output Summary (Last 24 hours) at 10/9/2023 1418  Last data filed at 10/8/2023 2045  Gross per 24 hour   Intake 120 ml   Output --   Net 120 ml      Body mass index is 23.13 kg/m .    Constitutional: awake, alert, cooperative, no apparent distress, and appears stated age  Respiratory: No increased  work of breathing, good air exchange, clear to auscultation bilaterally, no crackles or wheezing  Cardiovascular: Normal apical impulse, regular rate and rhythm, normal S1 and S2, no S3 or S4, and no murmur noted  GI: No scars, normal bowel sounds, soft, non-distended, non-tender, no masses palpated, no hepatosplenomegally  Skin: no bruising or bleeding and normal skin color, texture, turgor  Musculoskeletal: There is no redness, warmth, or swelling of the joints.  Full range of motion noted.  no lower extremity pitting edema present  Neurologic: Awake, alert, oriented to self only.  Neuropsychiatric: Appropriate with examiner      PERTINENT LABS/IMAGING:        Imaging results reviewed over the past 24 hrs:   No results found for this or any previous visit (from the past 24 hour(s)).    Discussed with, nursing staff and discharge planner    Brett Williamson MD  [facility] Medicine Service  [phone]

## 2023-10-09 NOTE — PLAN OF CARE
Problem: Plan of Care - These are the overarching goals to be used throughout the patient stay.    Goal: Plan of Care Review  Description: The Plan of Care Review/Shift note should be completed every shift.  The Outcome Evaluation is a brief statement about your assessment that the patient is improving, declining, or no change.  This information will be displayed automatically on your shift note.  Outcome: Progressing   Goal Outcome Evaluation:    Patient is alert and oriented to self. Episodes of severe agitation through the night requiring IM Zyprexa. 1:1 bedside attendant. Refused oral medications. Refused night time vitals. Incontinent of bowel and bladder.  No IV access.  Evelia Huffman RN

## 2023-10-10 NOTE — PLAN OF CARE
Problem: Plan of Care - These are the overarching goals to be used throughout the patient stay.    Goal: Plan of Care Review  Description: The Plan of Care Review/Shift note should be completed every shift.  The Outcome Evaluation is a brief statement about your assessment that the patient is improving, declining, or no change.  This information will be displayed automatically on your shift note.  Outcome: Progressing  Flowsheets (Taken 10/10/2023 1312)  Plan of Care Reviewed With: patient   Goal Outcome Evaluation:  Looking for placement  Problem: Behavioral Health Comorbidity  Goal: Maintenance of Behavioral Health Symptom Control  Outcome: Progressing  Intervention: Maintain Behavioral Health Symptom Control  Recent Flowsheet Documentation  Taken 10/10/2023 1130 by Ashley Krishna, RN  Medication Review/Management: medications reviewed-patient yelling out and taking swips at nurses/aids  Problem: Orthopaedic Fracture  Goal: Optimal Functional Ability-neck brace in place as per orders  Intervention: Optimize Functional Ability  Recent Flowsheet Documentation  Taken 10/10/2023 1130 by Ashley Krishna, RN  Activity Management: activity adjusted per tolerance  Positioning/Transfer Devices:   pillows   in use   Patient slightley cooperative - took some morning meds and refused to take anymore.  Able to feed patient and ate half of plate.  Will continue to reapproach with meds       Plan of Care Reviewed With: patient

## 2023-10-10 NOTE — PLAN OF CARE
Problem: Risk for Delirium  Goal: Improved Behavioral Control  Outcome: Not Progressing     Problem: Plan of Care - These are the overarching goals to be used throughout the patient stay.    Goal: Optimal Comfort and Wellbeing  Outcome: Progressing  Intermittent agitation when moving pt. Up in chair most of the shift after walking in hallway with PT. Wife at beside most of the shift. 1:1 out of room while wife at bedside, pt appropriately during that time. Willingly took medications this shift, had to crush tylenol as pt wanting to spit out medications. Took crushed pills with jello without issues. 1:1 within reach, c-collar in place.      Goal Outcome Evaluation:

## 2023-10-10 NOTE — PROGRESS NOTES
[facility]    Medicine Progress Note - Hospitalist Service    Date of Admission:  9/29/2023    Assessment & Plan   88-year-old with severe dementia with behavioral disturbance who lives in a senior memory care unit at [place] was brought in due to recurrent falls.  He sustained acute on chronic C7 and acute on chronic T3 fracture.  Neurosurgery was consulted and recommended conservative treatment.  Patient's behavior is not controlled with his home medications.  He is requiring one-to-one sitter and nonpharmacological measures are unable to control his behavior.  Given recurrent falls and ongoing issues with behavior prognosis is guarded.  Patient is much calmer but gets agitated occasionally. Continue to reduced dose of Sinemet Sinemet to 5-50 over few weeks.  Awaiting placement      Acute/subacute C7 and T3 fracture   Chronic T1 fracture  - S/P mechanical fall  - CT of head did not show any acute abnormality.   Spine imaging showed : superior endplate compression fracture of the C7 vertebrae with loss of 15% of vertebral body height. Subtle high signal change within the anterior/superior margin of the C7 vertebrae suggests that this fracture is acute/subacute in nature.   - Also found to have chronic compression fracture of the T1 vertebrae, acute/subacute T3 vertebrae #   Spine surgery consulted, recommend conservative management, cervical collar x 6-12 weeks time considering # is acute to subacute with follow up in 6 weeks time.    - D/C Ativan since it made his behavior worse.  - CODE STATUS changed to DNR on 9/30   - No specific plans regarding the 3 fracture 2. - Continue cervical collar.     Essential Hypertension  - Uncontrolled, secondary to pain  - Improving with better pain control continue IV hydralazine.  - Start low-dose Norvasc, increase dose to twice daily     Acute encephalopathy  -Baseline dementia with behavioral disturbance  - on seroquel 12.5 mg bid, 25 mg bid prn for  "agitation, ramelteon 8 mg at bedtime, trazodone 100 mg at bedtime -at home  --continue IM Zyprexa 2.5 mg twice daily as needed for agitation.    - High-dose Sinemet was DC'd on 9/30.  Patient did have agitation on  of Sinemet.  As per his geriatrician plan we will continue to taper off Sinemet.    - Decrease Sinemet to 5-50 on 10/2/23.  Plan to do this more than 2 weeks, discussed with pharmacist  - no evidence of hypoglycemia.     Neuropathy   - On lyrica 50 mg bid     History of a fib  - S/p pacemaker  - No anticoagulation because of fall     Weakness and deconditioning  - S/p recurrent falls  - Patient needs long-term care placement           Diet: Regular Diet Adult  Snacks/Supplements Adult: Ensure Enlive; Between Meals  Snacks/Supplements Adult: Magic Cup; Between Meals    DVT Prophylaxis: Enoxaparin (Lovenox) SQ  Manzo Catheter: Not present  Lines: None     Cardiac Monitoring: None  Code Status: No CPR- Do NOT Intubate      Clinically Significant Risk Factors                      # Dementia: noted on problem list    # Cachexia: Estimated body mass index is 17.68 kg/m  as calculated from the following:    Height as of this encounter: 1.93 m (6' 4\").    Weight as of this encounter: 65.9 kg (145 lb 4.5 oz).   # Moderate Malnutrition: based on nutrition assessment           Disposition Plan      Expected Discharge Date: 10/11/2023    Discharge Delays: 1:1 Sitter still ordered - MD to assess  Complex Discharge              Tess Valdes MD  Hospitalist Service  [facility]  Securely message with [employer] (more info)  Text page via [employer] Paging/Directory   ______________________________________________________________________    Interval History   Patient is new to me today. Chart reviewed.  Patient is seen and examined at bedside.   No acute complaints    Physical Exam   Vital Signs: Temp: 97.6  F (36.4  C) Temp src: Oral BP: (!) 149/69 Pulse: 81   Resp: 16 SpO2: 96 % O2 Device: " None (Room air)    Weight: 145 lbs 4.53 oz  GEN: Alert. Not in acute distress. Cervical collar  HEENT: Atraumatic, mucous membrane- moist and pink.  Chest: Bilateral air entry.  CVS: S1S2 regular.   Abdomen: Soft. Non-tender, non-distended. No organomegaly. No guarding or rigidity. Bowel sounds active.   Extremities: No pedal edema.  CNS: No involuntary movements.  Skin: no cyanosis or clubbing.       Medical Decision Making             Data

## 2023-10-10 NOTE — PLAN OF CARE
Problem: Behavioral Health Comorbidity  Goal: Maintenance of Behavioral Health Symptom Control  Outcome: Progressing  Intervention: Maintain Behavioral Health Symptom Control  Recent Flowsheet Documentation  Taken 10/10/2023 0023 by Annel Tafoya RN  Medication Review/Management: medications reviewed  Taken 10/9/2023 2105 by Annel Tafoya RN  Medication Review/Management: medications reviewed     Problem: Orthopaedic Fracture  Goal: Optimal Pain Control and Function  Outcome: Progressing     Problem: Violence Risk or Actual  Goal: Anger and Impulse Control  Outcome: Progressing  Intervention: Minimize Safety Risk  Recent Flowsheet Documentation  Taken 10/10/2023 0023 by Annel Tafoya RN  Sensory Stimulation Regulation:   care clustered   lighting decreased   quiet environment promoted  Enhanced Safety Measures: room near unit station  Taken 10/9/2023 2105 by Annel Tafoya RN  Sensory Stimulation Regulation:   care clustered   lighting decreased   quiet environment promoted  Enhanced Safety Measures: room near unit station   Goal Outcome Evaluation:       Pt alert and oriented x1 to self. Denies pain. Pt was calm and cooperative at the start of shift, took all HS medications. Pt became agitated wanting to take cervical collar off at 0100, PRN seroquel given at 0108. Pt calm down and slept throughout the night. Cervical collar in place at all times. No PIV. Continues to be on 1:1 for safety.

## 2023-10-10 NOTE — PLAN OF CARE
Problem: Plan of Care - These are the overarching goals to be used throughout the patient stay.    Goal: Absence of Hospital-Acquired Illness or Injury  Intervention: Identify and Manage Fall Risk  Recent Flowsheet Documentation  Taken 10/10/2023 1700 by Faith Dumont RN  Safety Promotion/Fall Prevention: assistive device/personal items within reach  Goal: Optimal Comfort and Wellbeing  Intervention: Provide Person-Centered Care  Recent Flowsheet Documentation  Taken 10/10/2023 1700 by Faith Dumont RN  Trust Relationship/Rapport:   care explained   choices provided     Problem: Behavioral Health Comorbidity  Goal: Maintenance of Behavioral Health Symptom Control  Intervention: Maintain Behavioral Health Symptom Control  Recent Flowsheet Documentation  Taken 10/10/2023 1700 by Faith Dumont RN  Medication Review/Management: medications reviewed     Problem: Violence Risk or Actual  Goal: Anger and Impulse Control  Intervention: Minimize Safety Risk  Recent Flowsheet Documentation  Taken 10/10/2023 1700 by Faith Dumont RN  Sensory Stimulation Regulation:   care clustered   lighting decreased   quiet environment promoted  Enhanced Safety Measures: room near unit station     Problem: Risk for Delirium  Goal: Improved Behavioral Control  Intervention: Minimize Safety Risk  Recent Flowsheet Documentation  Taken 10/10/2023 1700 by Faith Dumont RN  Enhanced Safety Measures: room near unit station  Trust Relationship/Rapport:   care explained   choices provided  Goal: Improved Attention and Thought Clarity  Intervention: Maximize Cognitive Function  Recent Flowsheet Documentation  Taken 10/10/2023 1700 by Faith Dumont RN  Sensory Stimulation Regulation:   care clustered   lighting decreased   quiet environment promoted  Reorientation Measures:   clock in view   reorientation provided   Goal Outcome Evaluation:  Pt has been sleepy between cares.  During cares he stiffens extremeties.  He took meds crushed in grape jelly.   He has a lidocaine patch on his left upper shoulder.  1:1 sitter this shift.  Wearing a cervical brace at all times.

## 2023-10-10 NOTE — PROGRESS NOTES
"CLINICAL NUTRITION SERVICES - ASSESSMENT NOTE     Nutrition Prescription    RECOMMENDATIONS FOR MDs/PROVIDERS TO ORDER:  Recommend mvi with minerals d/t not meeting RDIs with variable intake    Malnutrition Status:    Moderate in acute on chronic illness    Recommendations already ordered by Registered Dietitian (RD):  -Add Ensure enlive daily and magic cup daily     Future/Additional Recommendations:  Get diet hx and NFPA when able  Adjust supplements pending intake, weight, tolerance, acceptance, labs     REASON FOR ASSESSMENT  Rosemary Farmer is a/an 88 year old male assessed by the dietitian for LOS    Pt presents with acute C7, T3 fx, chronic T1 fx, weakness/deconditioning s/p recurrent falls  Hx Dementia, HTN    NUTRITION HISTORY  Pt lives in memory care long term  Intake has been variable in hospital %     Pt sleeping. No one else in room.   Phone number for wife in chart is not in service.     CURRENT NUTRITION ORDERS  Diet: Regular  Intake/Tolerance: Variable % - likely d/t dementia/confusion     LABS  Labs reviewed 10/6  UN 38.2 (H), increased    MEDICATIONS  Medications reviewed  Miralax bid, pericolace bid    ANTHROPOMETRICS  Height: 193 cm (6' 4\")  Most Recent Weight: 65.9 kg (145 lb 4.5 oz)  10/9  IBW: 91.8 kg  BMI: Underweight BMI <18.5  Weight History:   Wt Readings from Last 10 Encounters:   10/09/23  04/11/23  01/25/23 65.9 kg (145 lb 4.5 oz)  64.4 kg (142 lb 1.3 oz)   71.7 kg (158 lb 2 oz)     06/28/22 81.2 kg (179 lb)- office   01/03/22 82.1 kg (181 lb)   05/10/21 83.5 kg (184 lb)   10/09/20 83 kg (183 lb)   06/05/20 83.9 kg (185 lb)   05/24/11 82.8 kg (182 lb 8 oz)   No weight loss in the last 6 months. 20.6% weight loss x 10 months or less prior    Dosing Weight: 65.9 kg    ASSESSED NUTRITION NEEDS  Estimated Energy Needs: 6255-0678 kcals/day (30 - 35 kcals/kg )  Justification: Underweight  Estimated Protein Needs: 79-99 grams protein/day (1.2 - 1.5 grams of pro/kg)  Justification: Increased " needs  Estimated Fluid Needs: 8095-7198 mL/day (25 - 30 mL/kg)   Justification: Maintenance    PHYSICAL FINDINGS  See malnutrition section below.  Pt sleeping and covered up to his chin - limited NFPA  Last BM? No BM documented this stay    MALNUTRITION:  % Weight Loss:  > 20% in 1 year (severe malnutrition)  % Intake:  <75% for > 7 days (moderate malnutrition)- previous p.o intake unknown- not eating poorly on MST screen  Subcutaneous Fat Loss:  Orbital region moderate to severe depletion  Muscle Loss:  Temporal region moderate depletion  Fluid Retention:  None noted per nsg    Malnutrition Diagnosis: Moderate malnutrition  In Context of:  Acute illness or injury on chronic illness    NUTRITION DIAGNOSIS  Inadequate oral intake related to AMS as evidenced by variable intake, meeting <75% of needs      INTERVENTIONS  Implementation  -Add Ensure enlive daily and magic cup daily   -Recommend mvi with minerals d/t not meeting RDIs with variable intake    Goals  Meet >75% of estimated needs  Maintain weight     Monitoring/Evaluation  Progress toward goals will be monitored and evaluated per protocol.

## 2023-10-10 NOTE — PROGRESS NOTES
Patient has been off the 1:1 since 10am this morning. Patient has been calm. Patient is close to desk, alarms on, and curtain pulled back. Will monitor closely.  Lucia Gonzalez RN

## 2023-10-10 NOTE — PROGRESS NOTES
Care Management Follow Up    Length of Stay (days): 9    Expected Discharge Date: 10/11/2023     Concerns to be Addressed:     discharge planning  Patient plan of care discussed at interdisciplinary rounds: Yes    Anticipated Discharge Disposition:  transitional care     Anticipated Discharge Services:  24 hour care  Anticipated Discharge DME:  walker    Patient/family educated on Medicare website which has current facility and service quality ratings:    Education Provided on the Discharge Plan:  yes  Patient/Family in Agreement with the Plan:  yes    Referrals Placed by CM/SW:    Private pay costs discussed: Not applicable    Additional Information:  SW spoke with wife via phone. Wife requested referral be sent to [facility] ( [address] previously accepted at this facility. Additional information sent to facility for review and requested call to be returned. SW left message for (Ita Branch2 [phone]). Additional referrals sent to other Memory care facilities.  3:03 PM    CHRISTINE Dhillon  10/10/2023

## 2023-10-11 NOTE — PLAN OF CARE
Problem: Plan of Care - These are the overarching goals to be used throughout the patient stay.    Goal: Plan of Care Review  Description: The Plan of Care Review/Shift note should be completed every shift.  The Outcome Evaluation is a brief statement about your assessment that the patient is improving, declining, or no change.  This information will be displayed automatically on your shift note.  Outcome: Progressing  Flowsheets (Taken 10/11/2023 1026)  Plan of Care Reviewed With: patient   Goal Outcome Evaluation:looking for long term placement  Problem: Plan of Care - These are the overarching goals to be used throughout the patient stay.    Goal: Optimal Comfort and Wellbeing  Outcome: Progressing  Intervention: Provide Person-Centered Care  Recent Flowsheet Documentation  Taken 10/11/2023 0900 by Ashley Krishna RN  Trust Relationship/Rapport: care explained     Problem: Behavioral Health Comorbidity  Goal: Maintenance of Behavioral Health Symptom Control  Outcome: Progressing   Patient will grab hands and kick feet and sometimes spits  Problem: Seclusion/Restraint, Behavioral  Goal: Absence of Harm or Injury  Intervention: Protect Skin and Joint Integrity  Recent Flowsheet Documentation  Taken 10/11/2023 0900 by Ashley Krishna, RN  Body Position: position changed independently-monitoring skin  Looking for placment - have been feeding patient - incontinence         Plan of Care Reviewed With: patient

## 2023-10-11 NOTE — PROGRESS NOTES
[facility]    Medicine Progress Note - Hospitalist Service    Date of Admission:  9/29/2023    Assessment & Plan   88-year-old with severe dementia with behavioral disturbance who lives in a senior memory care unit at [facility] was brought in due to recurrent falls.  He sustained acute on chronic C7 and acute on chronic T3 fracture.  Neurosurgery was consulted and recommended conservative treatment.  Patient's behavior is not controlled with his home medications.  He is requiring one-to-one sitter and nonpharmacological measures are unable to control his behavior.  Given recurrent falls and ongoing issues with behavior prognosis is guarded.  Patient is much calmer but gets agitated occasionally. Continue to reduced dose of Sinemet Sinemet to 5-50 over few weeks.  Awaiting placement      Acute/subacute C7 and T3 fracture   Chronic T1 fracture  - S/P mechanical fall  - CT of head did not show any acute abnormality.   Spine imaging showed : superior endplate compression fracture of the C7 vertebrae with loss of 15% of vertebral body height. Subtle high signal change within the anterior/superior margin of the C7 vertebrae suggests that this fracture is acute/subacute in nature.   - Also found to have chronic compression fracture of the T1 vertebrae, acute/subacute T3 vertebrae #   Spine surgery consulted, recommend conservative management, cervical collar x 6-12 weeks time considering # is acute to subacute with follow up in 6 weeks time.    - D/C Ativan since it made his behavior worse.  - CODE STATUS changed to DNR on 9/30   - No specific plans regarding the 3 fracture 2. - Continue cervical collar.     Essential Hypertension  - Uncontrolled, secondary to pain  - Improving with better pain control continue IV hydralazine.  - Start low-dose Norvasc, increase dose to twice daily     Acute encephalopathy  -Baseline dementia with behavioral disturbance  - on seroquel 12.5 mg bid, 25 mg bid prn for  "agitation, ramelteon 8 mg at bedtime, trazodone 100 mg at bedtime -at home  --continue IM Zyprexa 2.5 mg twice daily as needed for agitation.    - High-dose Sinemet was DC'd on 9/30.  Patient did have agitation on  of Sinemet.  As per his geriatrician plan we will continue to taper off Sinemet.    - Decrease Sinemet to 5-50 on 10/2/23.  Plan to do this more than 2 weeks, discussed with pharmacist  - no evidence of hypoglycemia.     Neuropathy   - On lyrica 50 mg bid     History of a fib  - S/p pacemaker  - No anticoagulation because of fall     Weakness and deconditioning  - S/p recurrent falls  - Patient needs long-term care placement           Diet: Regular Diet Adult  Snacks/Supplements Adult: Ensure Enlive; Between Meals  Snacks/Supplements Adult: Magic Cup; Between Meals    DVT Prophylaxis: Enoxaparin (Lovenox) SQ  Manzo Catheter: Not present  Lines: None     Cardiac Monitoring: None  Code Status: No CPR- Do NOT Intubate      Clinically Significant Risk Factors                      # Dementia: noted on problem list    # Cachexia: Estimated body mass index is 17.68 kg/m  as calculated from the following:    Height as of this encounter: 1.93 m (6' 4\").    Weight as of this encounter: 65.9 kg (145 lb 4.5 oz).     # Moderate Malnutrition: based on nutrition assessment           Disposition Plan      Expected Discharge Date: 10/12/2023    Discharge Delays: 1:1 Sitter still ordered - MD to assess  Complex Discharge              Tess Valdes MD  Hospitalist Service  [facility]  Securely message with [email or web address] (more info)  Text page via [employer] Paging/Directory   ______________________________________________________________________    Interval History   Patient is seen and examined at bedside.   No acute complaints    Physical Exam   Vital Signs: Temp: 96.8  F (36  C) Temp src: Axillary BP: (!) 150/73 Pulse: 74   Resp: 16 SpO2: 97 % O2 Device: None (Room air)    Weight: 145 lbs 4.53 " oz  GEN: Alert. Not in acute distress. Cervical collar  HEENT: Atraumatic, mucous membrane- moist and pink.  Chest: Bilateral air entry.  CVS: S1S2 regular.   Abdomen: Soft. Non-tender, non-distended. No organomegaly. No guarding or rigidity. Bowel sounds active.   Extremities: No pedal edema.  CNS: No involuntary movements.  Skin: no cyanosis or clubbing.       Medical Decision Making             Data

## 2023-10-11 NOTE — PLAN OF CARE
Goal Outcome Evaluation:        Problem: Risk for Delirium  Goal: Improved Attention and Thought Clarity  Intervention: Maximize Cognitive Function  Recent Flowsheet Documentation  Taken 10/11/2023 0045 by Diana Wolff RN  Sensory Stimulation Regulation:   care clustered   lighting decreased   quiet environment promoted  Reorientation Measures:   clock in view   reorientation provided     Problem: Risk for Delirium  Goal: Improved Sleep  Outcome: Progressing  Patient alert and confused. Noted combative hitting staff and holding staff arms very tight when they are providing him cares. Redirected, did not listen. Incontinent of bladder and needed x 3 assistance with diaper change. Slept on and off and noted trying to get out of bed. Did frequent rounding for safety.

## 2023-10-11 NOTE — PROGRESS NOTES
Patient has been off of 1:1 since 10am 10/10/2023- has been cooperative at times and has taken his medication today.  He is being closely monitored and close to nurses station.  Bed alarm is on.

## 2023-10-11 NOTE — PROGRESS NOTES
ELEN following up on pending long-term referrals:  [facility] ([facility]): ELEN spoke with director Ashley.  Facility is unable to accept due to pt's acuity and staffing at facility.  Ashley reports she reached out to Pt's family with this update  [facility]: called and left voice mail requesting call back   [facility]: ELEN spoke with admissions, referral has yet to be reviewed.  ELEN provided call back number to contact CM after referral has been screened  [facility]: left voice mail for admissions requesting call back  [facility]: left voice mail for admissions requesting call back      ZACH Toscano at 2:53 PM on 10/11/23

## 2023-10-12 NOTE — PROGRESS NOTES
Patient placed on SNF lists at [facility], [facility] and [facility] in addition to assisted living memory care units per request of wife. She is aware of private cost.

## 2023-10-12 NOTE — PLAN OF CARE
"Patient alert, oriented to self. Patient denied pain, appears comfortable in bed. Around 0130, patient became restless and was removing clothing and ripping brief off. Patient was changed and cleaned up and pulled off new brief and gown, as well as neck collar. PRN seroquel given, effective as pt fell asleep was appearing more calm.   Problem: Plan of Care - These are the overarching goals to be used throughout the patient stay.    Goal: Plan of Care Review  Description: The Plan of Care Review/Shift note should be completed every shift.  The Outcome Evaluation is a brief statement about your assessment that the patient is improving, declining, or no change.  This information will be displayed automatically on your shift note.  Outcome: Progressing  Goal: Patient-Specific Goal (Individualized)  Description: You can add care plan individualizations to a care plan. Examples of Individualization might be:  \"Parent requests to be called daily at 9am for status\", \"I have a hard time hearing out of my right ear\", or \"Do not touch me to wake me up as it startles me\".  Outcome: Progressing  Goal: Readiness for Transition of Care  Outcome: Progressing   Goal Outcome Evaluation:                        "

## 2023-10-12 NOTE — PLAN OF CARE
Goal Outcome Evaluation:      Plan of Care Reviewed With: patient, spouse    Overall Patient Progress: improvingOverall Patient Progress: improving    Outcome Evaluation: Patient cooperative with all cares,  No longer requiring 1 to 1.  Up with assist of 2 to chair.  Alexander JORGENSEN on day shift.

## 2023-10-12 NOTE — PROGRESS NOTES
[facility]    Medicine Progress Note - Hospitalist Service    Date of Admission:  9/29/2023    Assessment & Plan   88-year-old with severe dementia with behavioral disturbance who lives in a senior memory care unit at [facility] was brought in due to recurrent falls.  He sustained acute on chronic C7 and acute on chronic T3 fracture.  Neurosurgery was consulted and recommended conservative treatment.  Patient's behavior is not controlled with his home medications.  He is requiring one-to-one sitter and nonpharmacological measures are unable to control his behavior.  Given recurrent falls and ongoing issues with behavior prognosis is guarded.  Patient is much calmer but gets agitated occasionally. Continue to reduced dose of Sinemet Sinemet to 5-50 over few weeks.  Awaiting placement      Acute/subacute C7 and T3 fracture   Chronic T1 fracture  - S/P mechanical fall  - CT of head did not show any acute abnormality.   Spine imaging showed : superior endplate compression fracture of the C7 vertebrae with loss of 15% of vertebral body height. Subtle high signal change within the anterior/superior margin of the C7 vertebrae suggests that this fracture is acute/subacute in nature.   - Also found to have chronic compression fracture of the T1 vertebrae, acute/subacute T3 vertebrae #   Spine surgery consulted, recommend conservative management, cervical collar x 6-12 weeks time considering # is acute to subacute with follow up in 6 weeks time.    - D/C Ativan since it made his behavior worse.  - CODE STATUS changed to DNR on 9/30   - No specific plans regarding the 3 fracture 2. - Continue cervical collar.     Essential Hypertension  - Uncontrolled, secondary to pain  - Improving with better pain control continue IV hydralazine.  - Start low-dose Norvasc, increase dose to twice daily     Acute encephalopathy  -Baseline dementia with behavioral disturbance  - on seroquel 12.5 mg bid, 25 mg bid prn for  "agitation, ramelteon 8 mg at bedtime, trazodone 100 mg at bedtime -at home  --continue IM Zyprexa 2.5 mg twice daily as needed for agitation.    - High-dose Sinemet was DC'd on 9/30.  Patient did have agitation on  of Sinemet.  As per his geriatrician plan we will continue to taper off Sinemet.    - Decrease Sinemet to 5-50 on 10/2/23.  Plan to do this more than 2 weeks, discussed with pharmacist  - no evidence of hypoglycemia.     Neuropathy   - On lyrica 50 mg bid     History of a fib  - S/p pacemaker  - No anticoagulation because of fall     Weakness and deconditioning  - S/p recurrent falls  - Patient needs long-term care placement           Diet: Regular Diet Adult  Snacks/Supplements Adult: Ensure Enlive; Between Meals  Snacks/Supplements Adult: Magic Cup; Between Meals    DVT Prophylaxis: Enoxaparin (Lovenox) SQ  Manzo Catheter: Not present  Lines: None     Cardiac Monitoring: None  Code Status: No CPR- Do NOT Intubate      Clinically Significant Risk Factors                      # Dementia: noted on problem list    # Cachexia: Estimated body mass index is 17.68 kg/m  as calculated from the following:    Height as of this encounter: 1.93 m (6' 4\").    Weight as of this encounter: 65.9 kg (145 lb 4.5 oz).     # Moderate Malnutrition: based on nutrition assessment           Disposition Plan   Awaiting Placement        Tess Valdes MD  Hospitalist Service  [facility]  Securely message with [employer] (more info)  Text page via [email or web address] Paging/Directory   ______________________________________________________________________    Interval History   Patient is seen and examined at bedside.   No acute complaints. Alert. Calm during exam.    Physical Exam   Vital Signs: Temp: 97.8  F (36.6  C) Temp src: Oral BP: 110/60 Pulse: 67   Resp: 20 SpO2: 97 % O2 Device: None (Room air)    Weight: 145 lbs 4.53 oz  GEN: Alert. Not in acute distress. Cervical collar  HEENT: Atraumatic, mucous " membrane- moist and pink.  Chest: Bilateral air entry.  CVS: S1S2 regular.   Abdomen: Soft. Non-tender, non-distended. No organomegaly. No guarding or rigidity. Bowel sounds active.   Extremities: No pedal edema.  CNS: No involuntary movements.  Skin: no cyanosis or clubbing.       Medical Decision Making             Data

## 2023-10-12 NOTE — PLAN OF CARE
Patient spent a fair shift. Napped earlier on the shift. Restless and resistive while awake. Patient made several attempts to climb out of bed. Difficult to redirect. On 2 different occasions, patient stripped naked, including removing the [place] J collar. Patient took medications crushed in apple sauce. Patient asleep at bedtime around 2100 and has stayed sleeping.    Problem: Orthopaedic Fracture  Goal: Absence of Bleeding  Outcome: Progressing     Problem: Behavioral Health Comorbidity  Goal: Maintenance of Behavioral Health Symptom Control  Outcome: Progressing   Goal Outcome Evaluation:

## 2023-10-13 NOTE — PLAN OF CARE
Problem: Plan of Care - These are the overarching goals to be used throughout the patient stay.    Goal: Absence of Hospital-Acquired Illness or Injury  Outcome: Progressing  Intervention: Identify and Manage Fall Risk  Recent Flowsheet Documentation  Taken 10/13/2023 0045 by Charly Garcia RN  Safety Promotion/Fall Prevention: activity supervised  Taken 10/12/2023 2000 by Charly Garcia RN  Safety Promotion/Fall Prevention: activity supervised  Intervention: Prevent and Manage VTE (Venous Thromboembolism) Risk  Recent Flowsheet Documentation  Taken 10/13/2023 0045 by Charly Garcia RN  VTE Prevention/Management: SCDs (sequential compression devices) off  Taken 10/12/2023 2000 by Charly Garcia RN  VTE Prevention/Management: SCDs (sequential compression devices) off     Problem: Behavioral Health Comorbidity  Goal: Maintenance of Behavioral Health Symptom Control  Outcome: Progressing  Intervention: Maintain Behavioral Health Symptom Control  Recent Flowsheet Documentation  Taken 10/13/2023 0045 by Charly Garcia RN  Medication Review/Management: medications reviewed  Taken 10/12/2023 2000 by Charly Garcia RN  Medication Review/Management: medications reviewed     Problem: Orthopaedic Fracture  Goal: Absence of Bleeding  Outcome: Progressing  Goal: Effective Bowel Elimination  Outcome: Progressing  Goal: Absence of Embolism Signs and Symptoms  Outcome: Progressing  Intervention: Prevent or Manage Embolism Risk  Recent Flowsheet Documentation  Taken 10/13/2023 0045 by Charly Garcia RN  VTE Prevention/Management: SCDs (sequential compression devices) off  Taken 10/12/2023 2000 by Charly Garcia RN  VTE Prevention/Management: SCDs (sequential compression devices) off  Goal: Fracture Stability  Outcome: Progressing  Goal: Optimal Functional Ability  Outcome: Progressing  Goal: Absence of Infection Signs and Symptoms  Outcome: Progressing  Goal: Effective Tissue Perfusion  Outcome: Progressing  Goal: Optimal Pain  Control and Function  Outcome: Progressing  Goal: Effective Oxygenation and Ventilation  Outcome: Progressing     Problem: Violence Risk or Actual  Goal: Anger and Impulse Control  Intervention: Minimize Safety Risk  Recent Flowsheet Documentation  Taken 10/13/2023 0045 by Charly Garcia, RN  Sensory Stimulation Regulation:   lighting decreased   care clustered  Enhanced Safety Measures: room near unit station  Taken 10/12/2023 2000 by Charly Garcia, RN  Sensory Stimulation Regulation:   lighting decreased   care clustered  Enhanced Safety Measures: room near unit station   Goal Outcome Evaluation:  VSS on RA, A&O to self, PAINAD assessment = 0. [place] J collar on at all times. Speaks a fair amount of english, enough to make needs known. Incontinent of B&B. Received PRN seroquel at 0100. No IV access. Some increased restlessness since lab draw at 0530. Pills crushed in applesauce. Regular diet, not OOB this shift. Report from previous nurse said they used sit to stand to commode.

## 2023-10-13 NOTE — CONSULTS
"Care Management Follow Up    Length of Stay (days): 12    Expected Discharge Date: 10/13/2023     Concerns to be Addressed:       Patient plan of care discussed at interdisciplinary rounds: Yes    Anticipated Discharge Disposition:  SNF/[facility]/MC     Anticipated Discharge Services:    Anticipated Discharge DME:      Patient/family educated on Medicare website which has current facility and service quality ratings:    Education Provided on the Discharge Plan:    Patient/Family in Agreement with the Plan:      Referrals Placed by CM/SW:    Private pay costs discussed: Not applicable    Additional Information:    Chart reviewed.    Pt is medically ready for discharge per Dr. Valdes.    Awaiting placement.    SNF/long term/MC referrals pending.    [place] SNF's - Spoke to Jessica, who is reviewing for placement.  [facility]- Left voicemail  [facility]- Left voicemail  [facility]- Left voicemail  [facility] - Left voicemail    Pt's wife Aparna is primary contact.  Spoke to Aparna - she will continue to review facilities.  She is okay with sending to facilities near [place] and surrounding areas.    Additional referrals sent - pending.    Social history: \"Assessed, spoke to wife Aparna and Swapna MEIER at [facility] [phone]. Pt is unable to return to assist g at this time due to level of agitation and his required level of care. Per Swapna MEIER on call, At baseline, pt is able to reposition and transfer w/an assist of one. He can get to toilet on his own with walker and feed himself. Pt lives alone at [facility]. In order for pt to return to [facility], he would need to be able to transfer and ambulate w/walker independently. Pt is easily agitated at baseline and is, per Swapna, the most challenging resident they have. He has habit of hitting and impulsive with activity and falls often. He destroyed his room last night. Swapna states that he is so restless that he may " "require some form of sedation and would probably need his medications adjusted. Pt is  of [employer] and loves talking about it, for distraction conversation, it's recommended to ask him about his passion. \"    CM will continue to follow.    Aidan Lu RN    "

## 2023-10-13 NOTE — PROGRESS NOTES
[facility]    Medicine Progress Note - Hospitalist Service    Date of Admission:  9/29/2023    Assessment & Plan   88-year-old with severe dementia with behavioral disturbance who lives in a senior memory care unit at [place] was brought in due to recurrent falls.  He sustained acute on chronic C7 and acute on chronic T3 fracture.  Neurosurgery was consulted and recommended conservative treatment.  Patient's behavior is not controlled with his home medications.  He is requiring one-to-one sitter and nonpharmacological measures are unable to control his behavior.  Given recurrent falls and ongoing issues with behavior prognosis is guarded.  Patient is much calmer but gets agitated occasionally. Continue to reduced dose of Sinemet Sinemet to 5-50 over few weeks.  Awaiting placement      Acute/subacute C7 and T3 fracture   Chronic T1 fracture  - S/P mechanical fall  - CT of head did not show any acute abnormality.   Spine imaging showed : superior endplate compression fracture of the C7 vertebrae with loss of 15% of vertebral body height. Subtle high signal change within the anterior/superior margin of the C7 vertebrae suggests that this fracture is acute/subacute in nature.   - Also found to have chronic compression fracture of the T1 vertebrae, acute/subacute T3 vertebrae #   Spine surgery consulted, recommend conservative management, cervical collar x 6-12 weeks time considering # is acute to subacute with follow up in 6 weeks time.    - D/C Ativan since it made his behavior worse.  - CODE STATUS changed to DNR on 9/30   - No specific plans regarding the 3 fracture 2. - Continue cervical collar.     Essential Hypertension  - Uncontrolled, secondary to pain  - Improving with better pain control continue IV hydralazine.  - Start low-dose Norvasc, increase dose to twice daily     Acute encephalopathy  -Baseline dementia with behavioral disturbance  -on seroquel 12.5 mg bid, 25 mg bid prn for  "agitation, ramelteon 8 mg at bedtime, trazodone 100 mg at bedtime -at home  - continue IM Zyprexa 2.5 mg twice daily as needed for agitation.    - High-dose Sinemet was DC'd on 9/30.  Patient did have agitation on  of Sinemet.  As per his geriatrician plan we will continue to taper off Sinemet.    - Decrease Sinemet to 5-50 on 10/2/23.  Plan to do this more than 2 weeks, discussed with pharmacist  - no evidence of hypoglycemia.     Neuropathy   - On lyrica 50 mg bid     History of a fib  - S/p pacemaker  - No anticoagulation because of fall     Weakness and deconditioning  - S/p recurrent falls  - Patient needs long-term care placement         Diet: Regular Diet Adult  Snacks/Supplements Adult: Ensure Enlive; Between Meals  Snacks/Supplements Adult: Magic Cup; Between Meals    DVT Prophylaxis: Enoxaparin (Lovenox) SQ  Manzo Catheter: Not present  Lines: None     Cardiac Monitoring: None  Code Status: No CPR- Do NOT Intubate      Clinically Significant Risk Factors                      # Dementia: noted on problem list    # Cachexia: Estimated body mass index is 17.98 kg/m  as calculated from the following:    Height as of this encounter: 1.93 m (6' 4\").    Weight as of this encounter: 67 kg (147 lb 11.3 oz).     # Moderate Malnutrition: based on nutrition assessment           Disposition Plan   Awaiting Placement        Tess Valdes MD  Hospitalist Service  [facility]  Securely message with [employer] (more info)  Text page via [employer] Paging/Directory   ______________________________________________________________________    Interval History   Patient is sleeping. Spouse at bedside.   No events noted.     Physical Exam   Vital Signs: Temp: 98  F (36.7  C) Temp src: Oral BP: 112/60 Pulse: 60   Resp: 18 SpO2: 95 % O2 Device: None (Room air)    Weight: 147 lbs 11.33 oz  Sleepy. Not in acute distress. Cervical collar    Medical Decision Making             Data     "

## 2023-10-13 NOTE — PROGRESS NOTES
SPIRITUAL HEALTH SERVICES ([facility])  SPIRITUAL ASSESSMENT Progress Note  [facility]. Unit P[address]    REFERRAL SOURCE: [facility]     Mrs Farmer, Aparna, and I spoke briefly. Mr Farmer was sleeping at the time of the visit and Aparna says that his sleep patterns change from day to day. She also said that they are members of [employer] in [place] and that there is a eucharistic  who regularly brings communion to them.  Aparna thinks that Mr Farmer would be open to anointing.      PLAN: Will refer Mr Farmer to the staff  for followup.      Mirlande Douglas, Ph.D., [facility]      [facility] available 24/7 for emergency requests/referrals, either by having the on-call  paged or by entering an ASAP/STAT consult in Epic (this will also page the on-call ).

## 2023-10-14 NOTE — PLAN OF CARE
Problem: Plan of Care - These are the overarching goals to be used throughout the patient stay.    Goal: Plan of Care Review  Description: The Plan of Care Review/Shift note should be completed every shift.  The Outcome Evaluation is a brief statement about your assessment that the patient is improving, declining, or no change.  This information will be displayed automatically on your shift note.  Outcome: Progressing     Problem: Plan of Care - These are the overarching goals to be used throughout the patient stay.    Goal: Optimal Comfort and Wellbeing  Outcome: Progressing  Intervention: Provide Person-Centered Care  Recent Flowsheet Documentation  Taken 10/14/2023 0000 by Yannick Hansen RN  Trust Relationship/Rapport:   care explained   choices provided   questions encouraged   reassurance provided  Taken 10/13/2023 2100 by Yannick Hansen RN  Trust Relationship/Rapport:   care explained   choices provided   questions encouraged   reassurance provided     Problem: Orthopaedic Fracture  Goal: Optimal Pain Control and Function  Outcome: Progressing     Problem: Risk for Delirium  Goal: Improved Behavioral Control  Outcome: Progressing  Intervention: Minimize Safety Risk  Recent Flowsheet Documentation  Taken 10/14/2023 0000 by Yannick Hansen RN  Communication Enhancement Strategies:   call light answered in person   extra time allowed for response  Enhanced Safety Measures:  at bedside  Trust Relationship/Rapport:   care explained   choices provided   questions encouraged   reassurance provided  Taken 10/13/2023 2100 by Yannick Hansen RN  Communication Enhancement Strategies:   call light answered in person   extra time allowed for response  Enhanced Safety Measures:  at bedside  Trust Relationship/Rapport:   care explained   choices provided   questions encouraged   reassurance provided   Goal Outcome Evaluation:       Patient currently on 1:1 observation through the night,  refused initially on taking his night medications, flung some of them mixed with apple sauce, able to eventually get patient to take his medications. Able to take meds better with pudding rather then apple sauce, just needs repeated redirection. Still becomes combative during incontinence care and when he doesn't get his way.

## 2023-10-14 NOTE — PLAN OF CARE
Goal Outcome Evaluation:       Pt confused, oriented to self only. Rested most of the day off and on, and was pleasant most of the time earlier. He has become aggressive and combative this evening. Dose of IM zyprexa given due to pt repeatedly removing neck brace and hitting and kicking at staff when brace is reapplied or when he is changed out of soiled brief. He is incontinent of stool and urine, has been taking off brief and pants. He is strong in all four extremities. Took meds earlier in the day, but refused this evening. Unable to redirect. Vital signs stable.

## 2023-10-14 NOTE — PLAN OF CARE
Problem: Behavioral Health Comorbidity  Goal: Maintenance of Behavioral Health Symptom Control  Outcome: Progressing     Problem: Orthopaedic Fracture  Goal: Absence of Bleeding  Outcome: Progressing   Goal Outcome Evaluation:       No verbal or nonverbal expressions of pain. Patient agitated, spitting at staff. Patients ate 100%, cooperated with scheduled medications, [place] J collar in place, incontinent of BB. Patient currently resting comfortably.

## 2023-10-14 NOTE — PLAN OF CARE
Problem: Risk for Delirium  Goal: Optimal Coping  Intervention: Optimize Psychosocial Adjustment to Delirium  Recent Flowsheet Documentation  Taken 10/14/2023 1521 by Faith Dumont RN  Family/Support System Care:   involvement promoted   presence promoted   support provided  Goal: Improved Behavioral Control  Intervention: Prevent and Manage Agitation  Recent Flowsheet Documentation  Taken 10/14/2023 1521 by Faith Dumont RN  Environment Familiarity/Consistency: daily routine followed  Intervention: Minimize Safety Risk  Recent Flowsheet Documentation  Taken 10/14/2023 1521 by Faith Dumont RN  Communication Enhancement Strategies: nonverbal strategies used  Enhanced Safety Measures:  at bedside  Goal: Improved Attention and Thought Clarity  Intervention: Maximize Cognitive Function  Recent Flowsheet Documentation  Taken 10/14/2023 1521 by Faith Dumont RN  Sensory Stimulation Regulation:   care clustered   auditory stimulation minimized   tactile stimulation minimized  Reorientation Measures:   clock in view   reorientation provided     Problem: Violence Risk or Actual  Goal: Anger and Impulse Control  Intervention: Minimize Safety Risk  Recent Flowsheet Documentation  Taken 10/14/2023 1521 by Faith Dumont RN  Sensory Stimulation Regulation:   care clustered   auditory stimulation minimized   tactile stimulation minimized  Enhanced Safety Measures:  at bedside     Problem: Orthopaedic Fracture  Goal: Absence of Embolism Signs and Symptoms  Intervention: Prevent or Manage Embolism Risk  Recent Flowsheet Documentation  Taken 10/14/2023 1521 by Faith Dumont RN  VTE Prevention/Management: SCDs (sequential compression devices) off  Goal: Optimal Functional Ability  Intervention: Optimize Functional Ability  Recent Flowsheet Documentation  Taken 10/14/2023 1521 by Faith Dumont RN  Activity Management:   bedrest   activity adjusted per tolerance  Positioning/Transfer Devices:  pillows  Goal: Effective Oxygenation and Ventilation  Intervention: Promote Airway Secretion Clearance  Recent Flowsheet Documentation  Taken 10/14/2023 1521 by Faith Dumont, RN  Activity Management:   bedrest   activity adjusted per tolerance  Intervention: Optimize Oxygenation and Ventilation  Recent Flowsheet Documentation  Taken 10/14/2023 1521 by Faith Dumont, RN  Head of Bed (HOB) Positioning: HOB at 15 degrees   Goal Outcome Evaluation:  Pt has been sleeping most of the shift.  Other than one episode of resistiveness during hygiene after an incontinent BM, he was calm and cooperative.  He took all meds in jelly with cues to open his mouth and swallow.  The only med he was not able to take is Miralax.  Pt has not required 1:1 due to his somnolence.  Only accepted one bite of his sandwich and spit it out with a grimace.  He drank half a carton of milk with lots of coaxing and reminders to open eyes so he sees straw.

## 2023-10-14 NOTE — PROGRESS NOTES
Care Management Follow Up    Length of Stay (days): 13    Expected Discharge Date: 10/13/2023     Concerns to be Addressed:       Patient plan of care discussed at interdisciplinary rounds: Yes    Anticipated Discharge Disposition:  SNF/[facility]     Anticipated Discharge Services:    Anticipated Discharge DME:      Patient/family educated on Medicare website which has current facility and service quality ratings:    Education Provided on the Discharge Plan:    Patient/Family in Agreement with the Plan:      Referrals Placed by CM/SW:    Private pay costs discussed: Not applicable    Additional Information:  Pt on a 1:1 at this time and is medically ready for discharge; awaiting placement. SW spoke with [facility] (Katy) who stated that [facility] would best be able to meet pt's needs as he needs memory care and need for re-direction often. Referral sent over to facility to review.    SNF/[facility] referrals pending.    [facility] - Spoke with Katy 10/14/2023, who is reviewing for placement.  Lyndon Cheung- RNCM Left voicemail 10/13  [facility]- RNCM Left voicemail 10/13  [facility]- RNCM Left voicemail 10/13  [facility] - RNCM Left voicemail 10/13    CM will continue to follow.    CHRISTINE Dhillon

## 2023-10-14 NOTE — PROGRESS NOTES
[facility]    Medicine Progress Note - Hospitalist Service    Date of Admission:  9/29/2023    Assessment & Plan   88-year-old with severe dementia with behavioral disturbance who lives in a senior memory care unit at [place] was brought in due to recurrent falls.  He sustained acute on chronic C7 and acute on chronic T3 fracture.  Neurosurgery was consulted and recommended conservative treatment.  Patient's behavior is not controlled with his home medications.  He is requiring one-to-one sitter and nonpharmacological measures are unable to control his behavior.  Given recurrent falls and ongoing issues with behavior prognosis is guarded.  Patient is much calmer but gets agitated occasionally. Continue to reduced dose of Sinemet Sinemet to 5-50 over few weeks.  Awaiting placement      Acute/subacute C7 and T3 fracture   Chronic T1 fracture  - S/P mechanical fall  - CT of head did not show any acute abnormality.   Spine imaging showed : superior endplate compression fracture of the C7 vertebrae with loss of 15% of vertebral body height. Subtle high signal change within the anterior/superior margin of the C7 vertebrae suggests that this fracture is acute/subacute in nature.   - Also found to have chronic compression fracture of the T1 vertebrae, acute/subacute T3 vertebrae #   Spine surgery consulted, recommend conservative management, cervical collar x 6-12 weeks time considering # is acute to subacute with follow up in 6 weeks time.    - D/C Ativan since it made his behavior worse.     Essential Hypertension  - Norvasc increased to 7.5 mg  - hydralazin iv prn  - monitor vital signs per protocol     Acute encephalopathy  - Baseline dementia with behavioral disturbance  - On seroquel 12.5 mg bid, 50 mg at bedtime, 25 mg bid prn for agitation; ramelteon 8 mg at bedtime, trazodone 100 mg at bedtime   - Continue IM Zyprexa 2.5 mg twice daily as needed for agitation.    - High-dose Sinemet was  "DC'd on 9/30.  Patient did have agitation on  of Sinemet.  As per his geriatrician plan we will continue to taper off Sinemet.    - Decrease Sinemet to 5-50 on 10/2/23.  Plan to do this more than 2 weeks, discussed with pharmacist  - no evidence of hypoglycemia.     Neuropathy   - On lyrica 50 mg bid     History of a fib  - S/p pacemaker  - No anticoagulation because of fall     Weakness and deconditioning  - S/p recurrent falls  - Patient needs long-term care placement         Diet: Regular Diet Adult  Snacks/Supplements Adult: Ensure Enlive; Between Meals  Snacks/Supplements Adult: Magic Cup; Between Meals    DVT Prophylaxis: Enoxaparin (Lovenox) SQ  Manzo Catheter: Not present  Lines: None     Cardiac Monitoring: None  Code Status: No CPR- Do NOT Intubate      Clinically Significant Risk Factors                      # Dementia: noted on problem list    # Cachexia: Estimated body mass index is 17.98 kg/m  as calculated from the following:    Height as of this encounter: 1.93 m (6' 4\").    Weight as of this encounter: 67 kg (147 lb 11.3 oz).     # Moderate Malnutrition: based on nutrition assessment           Disposition Plan   Awaiting Placement        Tess Valdes MD  Hospitalist Service  [facility]  Securely message with [employer] (more info)  Text page via [email or web address] Paging/Directory   ______________________________________________________________________    Interval History   Patient is awake and calm during encounter. 1:1   No events noted.     Physical Exam   Vital Signs: Temp: 98.7  F (37.1  C) Temp src: Oral BP: (!) 157/84 Pulse: 60   Resp: 18 SpO2: 97 % O2 Device: None (Room air)    Weight: 147 lbs 11.33 oz    GEN: Alert and oriented. Not in acute distress. Cervical collar.  HEENT: Atraumatic, mucous membrane- moist and pink.  Chest: Bilateral air entry.  CVS: S1S2 regular.   Abdomen: Soft. Non-tender, non-distended. No organomegaly. No guarding or rigidity. Bowel sounds " active.   Extremities: No pedal edema.  CNS: No involuntary movements.  Skin: no cyanosis or clubbing.     Medical Decision Making             Data

## 2023-10-15 NOTE — PROGRESS NOTES
[facility]    Medicine Progress Note - Hospitalist Service    Date of Admission:  9/29/2023    Assessment & Plan   88-year-old with severe dementia with behavioral disturbance who lives in a senior memory care unit at [place] was brought in due to recurrent falls.  He sustained acute on chronic C7 and acute on chronic T3 fracture.  Neurosurgery was consulted and recommended conservative treatment.  Patient's behavior is not controlled with his home medications.  He is requiring one-to-one sitter and nonpharmacological measures are unable to control his behavior.  Given recurrent falls and ongoing issues with behavior prognosis is guarded.  Patient is much calmer but gets agitated occasionally. Continue to reduced dose of Sinemet Sinemet to 5-50 over few weeks.  Awaiting placement. Off 1:1.      Acute/subacute C7 and T3 fracture   Chronic T1 fracture  - S/P mechanical fall  - CT of head did not show any acute abnormality.   Spine imaging showed : superior endplate compression fracture of the C7 vertebrae with loss of 15% of vertebral body height. Subtle high signal change within the anterior/superior margin of the C7 vertebrae suggests that this fracture is acute/subacute in nature.   - Also found to have chronic compression fracture of the T1 vertebrae, acute/subacute T3 vertebrae #   Spine surgery consulted, recommend conservative management, cervical collar x 6-12 weeks time considering # is acute to subacute with follow up in 6 weeks time.    - D/C Ativan since it made his behavior worse.     Essential Hypertension  - Norvasc increased to 7.5 mg  - hydralazin iv prn  - monitor vital signs per protocol     Acute encephalopathy  - Baseline dementia with behavioral disturbance  - On seroquel 12.5 mg bid, 50 mg at bedtime, 25 mg bid prn for agitation; ramelteon 8 mg at bedtime, trazodone 100 mg at bedtime   - Continue IM Zyprexa 2.5 mg twice daily as needed for agitation.    - High-dose  "Sinemet was DC'd on 9/30.  Patient did have agitation on  of Sinemet.  As per his geriatrician plan we will continue to taper off Sinemet.    - Decrease Sinemet to 5-50 on 10/2/23.  Plan to do this more than 2 weeks, discussed with pharmacist  - no evidence of hypoglycemia.     Neuropathy   - On lyrica 50 mg bid     History of a fib  - S/p pacemaker  - No anticoagulation because of fall     Weakness and deconditioning  - S/p recurrent falls  - Patient needs long-term care placement          Diet: Regular Diet Adult  Snacks/Supplements Adult: Ensure Enlive; Between Meals  Snacks/Supplements Adult: Magic Cup; Between Meals    DVT Prophylaxis: Enoxaparin (Lovenox) SQ  Manzo Catheter: Not present  Lines: None     Cardiac Monitoring: None  Code Status: No CPR- Do NOT Intubate      Clinically Significant Risk Factors                      # Dementia: noted on problem list    # Cachexia: Estimated body mass index is 17.98 kg/m  as calculated from the following:    Height as of this encounter: 1.93 m (6' 4\").    Weight as of this encounter: 67 kg (147 lb 11.3 oz).   # Moderate Malnutrition: based on nutrition assessment           Disposition Plan        Await placement. Medically ready.          Delmy Daniels MD  Hospitalist Service  [facility]  Securely message with [employer] (more info)  Text page via [employer] Paging/Directory   ______________________________________________________________________    Interval History   No new complaints, denies pain or fever, no n/v    Physical Exam   Vital Signs:       Pulse: 60   Resp: 18        Weight: 147 lbs 11.33 oz    General.  Awake not in acute distress.  HEENT.  Pupils equal round react to light, anicteric, EOM intact.  Neck on cervical collar  CVS regular rhythm no murmur gallops.  Lungs.  Clear to auscultation bilateral no wheezing or rales.  Abdomen.  Soft nontender bowel sounds present.  Extremities.  No edema no calf tenderness.  Neurological.  " General weakness  Skin no rash. No pallor.  Psych. Impaired memory    Medical Decision Making       37 MINUTES SPENT BY ME on the date of service doing chart review, history, exam, documentation & further activities per the note.      Data         Imaging results reviewed over the past 24 hrs:   No results found for this or any previous visit (from the past 24 hour(s)).

## 2023-10-15 NOTE — PLAN OF CARE
Problem: Risk for Delirium  Goal: Improved Attention and Thought Clarity  Outcome: Not Progressing  Intervention: Maximize Cognitive Function  Recent Flowsheet Documentation  Taken 10/15/2023 0023 by Jeri Cunha RN  Sensory Stimulation Regulation:   care clustered   auditory stimulation minimized   tactile stimulation minimized  Reorientation Measures:   clock in view   reorientation provided     Problem: Orthopaedic Fracture  Goal: Absence of Bleeding  Outcome: Progressing     Problem: Orthopaedic Fracture  Goal: Effective Bowel Elimination  Outcome: Progressing     Problem: Orthopaedic Fracture  Goal: Fracture Stability  Outcome: Progressing   Goal Outcome Evaluation:       Pt is alert and confused, was has been mostly calm first part of the shift, denied vitals , had a bowel incontinence and dogged into his brief, tore the whole brief and soiled the whole bed and rails with faeces. Denied pain, has collar brace on.

## 2023-10-15 NOTE — PLAN OF CARE
Problem: Plan of Care - These are the overarching goals to be used throughout the patient stay.    Goal: Absence of Hospital-Acquired Illness or Injury  Intervention: Identify and Manage Fall Risk  Recent Flowsheet Documentation  Taken 10/15/2023 1551 by Faith Dumont RN  Safety Promotion/Fall Prevention:   activity supervised   clutter free environment maintained   mobility aid in reach   nonskid shoes/slippers when out of bed   safety round/check completed   supervised activity  Intervention: Prevent and Manage VTE (Venous Thromboembolism) Risk  Recent Flowsheet Documentation  Taken 10/15/2023 1551 by Faith Dumont RN  VTE Prevention/Management: SCDs (sequential compression devices) off  Goal: Optimal Comfort and Wellbeing  Intervention: Provide Person-Centered Care  Recent Flowsheet Documentation  Taken 10/15/2023 1551 by Faith Dumont RN  Trust Relationship/Rapport:   care explained   choices provided   questions encouraged   reassurance provided     Problem: Behavioral Health Comorbidity  Goal: Maintenance of Behavioral Health Symptom Control  Intervention: Maintain Behavioral Health Symptom Control  Recent Flowsheet Documentation  Taken 10/15/2023 1551 by Faith Dumont RN  Medication Review/Management:   medications reviewed   high-risk medications identified     Problem: Orthopaedic Fracture  Goal: Absence of Embolism Signs and Symptoms  Intervention: Prevent or Manage Embolism Risk  Recent Flowsheet Documentation  Taken 10/15/2023 1551 by Faith Dumont RN  VTE Prevention/Management: SCDs (sequential compression devices) off     Problem: Violence Risk or Actual  Goal: Anger and Impulse Control  Intervention: Minimize Safety Risk  Recent Flowsheet Documentation  Taken 10/15/2023 1551 by Faith Dumont RN  Sensory Stimulation Regulation:   care clustered   auditory stimulation minimized   tactile stimulation minimized  Enhanced Safety Measures:  at bedside     Problem: Risk for Delirium  Goal:  Optimal Coping  Intervention: Optimize Psychosocial Adjustment to Delirium  Recent Flowsheet Documentation  Taken 10/15/2023 1551 by Faith Dumont, RN  Family/Support System Care:   involvement promoted   presence promoted   support provided  Goal: Improved Behavioral Control  Intervention: Minimize Safety Risk  Recent Flowsheet Documentation  Taken 10/15/2023 1551 by Faith Dumont, RN  Communication Enhancement Strategies: nonverbal strategies used  Enhanced Safety Measures:  at bedside  Trust Relationship/Rapport:   care explained   choices provided   questions encouraged   reassurance provided  Goal: Improved Attention and Thought Clarity  Intervention: Maximize Cognitive Function  Recent Flowsheet Documentation  Taken 10/15/2023 1551 by Faith Dumont, RN  Sensory Stimulation Regulation:   care clustered   auditory stimulation minimized   tactile stimulation minimized  Reorientation Measures:   clock in view   calendar in view   Goal Outcome Evaluation:  Pt has had intermittent confused episodes of anger and restlessness.  He has been aggressive with cares.  Incontinent voids and bms this shift.  Bowel meds are held.  Attempted to get pt up to commode as he was demanding to go to the bathroom but he was not able to fully stand with 3 assist.  Most of meds were accepted in jelly.

## 2023-10-16 NOTE — PLAN OF CARE
Problem: Plan of Care - These are the overarching goals to be used throughout the patient stay.    Goal: Optimal Comfort and Wellbeing  Outcome: Progressing  Intervention: Provide Person-Centered Care  Recent Flowsheet Documentation  Taken 10/16/2023 0927 by Nancy Beckford RN  Trust Relationship/Rapport:   care explained   choices provided   questions encouraged   reassurance provided     Problem: Plan of Care - These are the overarching goals to be used throughout the patient stay.    Goal: Readiness for Transition of Care  Outcome: Progressing   Goal Outcome Evaluation:  Patient oriented to person & time.   Remains with 1:1 sitter at bedside. Behaviorally appropriate.  Sleepy this morning but encouraged to be up out of bed for mealtimes  Incontinent of bowel and bladder  Eating well

## 2023-10-16 NOTE — PROGRESS NOTES
[facility]    Medicine Progress Note - Hospitalist Service    Date of Admission:  9/29/2023    Assessment & Plan   88-year-old with severe dementia with behavioral disturbance who lives in a senior memory care unit at [place] was brought in due to recurrent falls.  He sustained acute on chronic C7 and acute on chronic T3 fracture.  Neurosurgery was consulted and recommended conservative treatment.  Patient's behavior is not controlled with his home medications.  He is requiring one-to-one sitter and nonpharmacological measures are unable to control his behavior.  Given recurrent falls and ongoing issues with behavior prognosis is guarded.  Patient is much calmer but gets agitated occasionally. Continue to reduced dose of Sinemet Sinemet to 5-50 over few weeks.  Awaiting placement. Off 1:1.      Acute/subacute C7 and T3 fracture   Chronic T1 fracture  - S/P mechanical fall  - CT of head did not show any acute abnormality.   Spine imaging showed : superior endplate compression fracture of the C7 vertebrae with loss of 15% of vertebral body height. Subtle high signal change within the anterior/superior margin of the C7 vertebrae suggests that this fracture is acute/subacute in nature.   - Also found to have chronic compression fracture of the T1 vertebrae, acute/subacute T3 vertebrae #   Spine surgery consulted, recommend conservative management, cervical collar x 6-12 weeks time considering # is acute to subacute with follow up in 6 weeks time.    - D/C Ativan since it made his behavior worse.     Essential Hypertension  - Norvasc increased to 7.5 mg  - hydralazin iv prn  - monitor vital signs per protocol     Acute encephalopathy  - Baseline dementia with behavioral disturbance  - On seroquel 12.5 mg bid, 50 mg at bedtime, 25 mg bid prn for agitation; ramelteon 8 mg at bedtime, trazodone 100 mg at bedtime   - Continue IM Zyprexa 2.5 mg twice daily as needed for agitation.    - High-dose  "Sinemet was DC'd on 9/30.  Patient did have agitation on  of Sinemet.  As per his geriatrician plan we will continue to taper off Sinemet.    - Decrease Sinemet to 5-50 on 10/2/23.  Plan to do this more than 2 weeks, discussed with pharmacist  - no evidence of hypoglycemia.     Neuropathy   - On lyrica 50 mg bid     History of a fib  - S/p pacemaker  - No anticoagulation because of fall     Weakness and deconditioning  - S/p recurrent falls  - Patient needs long-term care placement              Diet: Regular Diet Adult  Snacks/Supplements Adult: Ensure Enlive; Between Meals  Snacks/Supplements Adult: Magic Cup; Between Meals    DVT Prophylaxis: Enoxaparin (Lovenox) SQ  Manzo Catheter: Not present  Lines: None     Cardiac Monitoring: None  Code Status: No CPR- Do NOT Intubate      Clinically Significant Risk Factors                      # Dementia: noted on problem list    # Cachexia: Estimated body mass index is 17.98 kg/m  as calculated from the following:    Height as of this encounter: 1.93 m (6' 4\").    Weight as of this encounter: 67 kg (147 lb 11.3 oz).   # Moderate Malnutrition: based on nutrition assessment           Disposition Plan        Await placement; medically ready         Delmy Daniels MD  Hospitalist Service  [facility]  Securely message with [employer] (more info)  Text page via [employer] Paging/Directory   ______________________________________________________________________    Interval History   No new complaints, poor historian, confused and disoriented    Physical Exam   Vital Signs: Temp: 97.8  F (36.6  C) Temp src: Axillary BP: 137/62 Pulse: 66   Resp: 18 SpO2: 97 % O2 Device: None (Room air)    Weight: 147 lbs 11.33 oz    General.  Awake confused, disoriented not in acute distress.  HEENT.  Pupils equal round react to light, anicteric, EOM intact.  Neck wearing collar  CVS regular rhythm no murmur gallops.  Lungs.  Clear to auscultation bilateral no wheezing or " rales.  Abdomen.  Soft nontender bowel sounds present.  Extremities.  No edema no calf tenderness.  Neurological. General weakness  Skin no rash. No pallor.  Psych. Impaired memory and cognition    Medical Decision Making       45 MINUTES SPENT BY ME on the date of service doing chart review, history, exam, documentation & further activities per the note.      Data         Imaging results reviewed over the past 24 hrs:   No results found for this or any previous visit (from the past 24 hour(s)).

## 2023-10-16 NOTE — PROGRESS NOTES
Care Management Follow Up    Length of Stay (days): 15    Expected Discharge Date: 10/17/2023     Concerns to be Addressed:     1 to 1   Patient plan of care discussed at interdisciplinary rounds: Yes    Anticipated Discharge Disposition:  Memory Care - LTC     Anticipated Discharge Services:  LTC  Anticipated Discharge DME:  To be determined     Patient/family educated on Medicare website which has current facility and service quality ratings:  yes  Education Provided on the Discharge Plan:  yes  Patient/Family in Agreement with the Plan:  yes    Referrals Placed by CM/SW:  Multiple memory care - LTC  Private pay costs discussed: All options explored with wife.    Additional Information:  Previously assessed.  SNF's unable to accommodate due to 1:1 and ability to rehab    TREY Noonan   11:36 AM   10/16/23

## 2023-10-16 NOTE — PROGRESS NOTES
"CLINICAL NUTRITION SERVICES - ASSESSMENT NOTE     Nutrition Prescription    RECOMMENDATIONS FOR MDs/PROVIDERS TO ORDER:  None    Malnutrition Status:    Moderate in acute on chronic illness    Recommendations already ordered by Registered Dietitian (RD):  None    Future/Additional Recommendations:  Get diet hx and NFPA when able  Adjust supplements pending intake, weight, tolerance, acceptance, labs       CURRENT NUTRITION ORDERS  Diet: Regular  Supplement; Ensure enlive and magic cup daily    Intake/Tolerance: Variable, fair  100% at meals documented  Took bite sandwich he then spit out and 1/2 of a milk at supper 10/14  Ordering 3 meal/day most day.     -Yesterday estimate intake over 2 documented out of 3 ordered meals at 1654 kcal, 54 g protein- met 80% of estimated kcal and 70% of estimated protien needs. Supplement intake not documented    % of each ensure taken 10/12 and 10/13    -Pt sleeping in his chair with 1:1 at bedside. CNA reports pt was sleepy for lunch and ate about 50%. -Has not taken supplements today yet.   -No unopened supplements in room except from today  -Did take 1/2 of an ensure yesterday - left in room from day prior per CNA    LABS  No recent labs    MEDICATIONS  Medications reviewed  Miralax bid, pericolace bid, mvi with minerals    ANTHROPOMETRICS  Height: 193 cm (6' 4\")  Most Recent Weight: 67 kg (147 lb 11.3 oz) 10/13, up 7 lb from day prior  IBW: 91.8 kg  BMI: Underweight BMI <18.5  Weight History:   Wt Readings from Last 10 Encounters:   10/09/23  04/11/23  01/25/23 65.9 kg (145 lb 4.5 oz)  64.4 kg (142 lb 1.3 oz)   71.7 kg (158 lb 2 oz)     06/28/22 81.2 kg (179 lb)- office   01/03/22 82.1 kg (181 lb)   05/10/21 83.5 kg (184 lb)   10/09/20 83 kg (183 lb)   06/05/20 83.9 kg (185 lb)   05/24/11 82.8 kg (182 lb 8 oz)   No weight loss in the last 6 months. 20.6% weight loss x 10 months or less prior    Dosing Weight: 65.9 kg    ASSESSED NUTRITION NEEDS  Estimated Energy Needs: " 8971-4466 kcals/day (30 - 35 kcals/kg )  Justification: Underweight  Estimated Protein Needs: 79-99 grams protein/day (1.2 - 1.5 grams of pro/kg)  Justification: Increased needs  Estimated Fluid Needs: 0735-9725 mL/day (25 - 30 mL/kg)   Justification: Maintenance    PHYSICAL FINDINGS  See malnutrition section below.  Pt sleeping and covered up to his chin - limited NFPA  GI - 1-3 soft BM/day    MALNUTRITION:  % Weight Loss:  > 20% in 1 year (severe malnutrition)  % Intake:  <75% for > 7 days (moderate malnutrition)- previous p.o intake unknown- not eating poorly on MST screen  Subcutaneous Fat Loss:  Orbital region moderate to severe depletion  Muscle Loss:  Temporal region moderate depletion  Fluid Retention:  None noted per nsg    Malnutrition Diagnosis: Moderate malnutrition  In Context of:  Acute illness or injury on chronic illness    NUTRITION DIAGNOSIS  Inadequate oral intake related to AMS as evidenced by variable intake, meeting <75% of needs      INTERVENTIONS  Implementation  No new    Goals  Meet >75% of estimated needs- progressing  Maintain/gain weight - progressing     Monitoring/Evaluation  Progress toward goals will be monitored and evaluated per protocol.

## 2023-10-16 NOTE — PLAN OF CARE
Problem: Plan of Care - These are the overarching goals to be used throughout the patient stay.    Goal: Plan of Care Review  Description: The Plan of Care Review/Shift note should be completed every shift.  The Outcome Evaluation is a brief statement about your assessment that the patient is improving, declining, or no change.  This information will be displayed automatically on your shift note.  Outcome: Progressing   Goal Outcome Evaluation:    Patient is alert and oriented to self. He is quite talkative and cooperative through the night, although he was awake all night. I did administer Melatonin early this morning per his request. He took oral meds without hesitation. Remains on bedside attendant. Regular diet. Vital signs stable.   Evelia Huffman RN

## 2023-10-17 NOTE — PROGRESS NOTES
CM assisting with placement.     Maribel from [phone] Fax: [phone]- eligible for [facility]'s memory care and is currently on wait list. Updated nursing notes sent per request.     Nabila Montez [phone] Fax: [phone]- Referral faxed.     Marybeth Swanson, [facility]  10/17/2023

## 2023-10-17 NOTE — PLAN OF CARE
Problem: Confusion Chronic  Goal: Optimal Cognitive Function  10/17/2023 1450 by Amada Aguilar, RN  Outcome: Progressing  Fluctuating behaviors - generally redirectable.  Remains off 1:1 sitter.  Alarms in place for patient safety.    Problem: Orthopaedic Fracture  Goal: Fracture Stability  10/17/2023 1450 by Amada Aguilar, RN  Outcome: Progressing  Cervical collar in place.    Amada Aguilar, HARDEEP

## 2023-10-17 NOTE — PROGRESS NOTES
[facility]    Medicine Progress Note - Hospitalist Service    Date of Admission:  9/29/2023    Assessment & Plan   88-year-old with severe dementia with behavioral disturbance who lives in a senior memory care unit at [place] was brought in due to recurrent falls.  He sustained acute on chronic C7 and acute on chronic T3 fracture.  Neurosurgery was consulted and recommended conservative treatment.  Patient's behavior is not controlled with his home medications.  He is requiring one-to-one sitter and nonpharmacological measures are unable to control his behavior.  Given recurrent falls and ongoing issues with behavior prognosis is guarded.  Patient is much calmer but gets agitated occasionally. Continue to reduced dose of Sinemet Sinemet to 5-50 over few weeks.  Awaiting placement. Off 1:1.      Acute/subacute C7 and T3 fracture   Chronic T1 fracture  - S/P mechanical fall  - CT of head did not show any acute abnormality.   Spine imaging showed : superior endplate compression fracture of the C7 vertebrae with loss of 15% of vertebral body height. Subtle high signal change within the anterior/superior margin of the C7 vertebrae suggests that this fracture is acute/subacute in nature.   - Also found to have chronic compression fracture of the T1 vertebrae, acute/subacute T3 vertebrae #   Spine surgery consulted, recommend conservative management, cervical collar x 6-12 weeks time considering # is acute to subacute with follow up in 6 weeks time.    - D/C Ativan since it made his behavior worse.     Essential Hypertension  - Norvasc increased to 7.5 mg  - hydralazin iv prn  - monitor vital signs per protocol     Acute encephalopathy  - Baseline dementia with behavioral disturbance  - On seroquel 12.5 mg bid, 50 mg at bedtime, 25 mg bid prn for agitation; ramelteon 8 mg at bedtime, trazodone 100 mg at bedtime   - Continue IM Zyprexa 2.5 mg twice daily as needed for agitation.    - High-dose  "Sinemet was DC'd on 9/30.  Patient did have agitation on  of Sinemet.  As per his geriatrician plan we will continue to taper off Sinemet.    - Decrease Sinemet to 5-50 on 10/2/23.  Plan to do this more than 2 weeks, discussed with pharmacist  - no evidence of hypoglycemia.     Neuropathy   - On lyrica 50 mg bid     History of a fib  - S/p pacemaker  - No anticoagulation because of fall     Weakness and deconditioning  - S/p recurrent falls  - Patient needs long-term care placement              Diet: Regular Diet Adult  Snacks/Supplements Adult: Ensure Enlive; Between Meals  Snacks/Supplements Adult: Magic Cup; Between Meals    DVT Prophylaxis: Enoxaparin (Lovenox) SQ  Manzo Catheter: Not present  Lines: None     Cardiac Monitoring: None  Code Status: No CPR- Do NOT Intubate      Clinically Significant Risk Factors                      # Dementia: noted on problem list    # Cachexia: Estimated body mass index is 17.98 kg/m  as calculated from the following:    Height as of this encounter: 1.93 m (6' 4\").    Weight as of this encounter: 67 kg (147 lb 11.3 oz).   # Moderate Malnutrition: based on nutrition assessment           Disposition Plan     Expected Discharge Date: 10/17/2023    Discharge Delays: Complex Discharge  Placement - LTC                Delmy Daniels MD  Hospitalist Service  [facility]  Securely message with [employer] (more info)  Text page via [employer] Paging/Directory   ______________________________________________________________________    Interval History   No new complaints, denies cp/sob, no n/v, no f/c    Physical Exam   Vital Signs: Temp: 98.7  F (37.1  C) Temp src: Oral BP: 116/56 Pulse: 61   Resp: 16 SpO2: 95 % O2 Device: None (Room air)    Weight: 147 lbs 11.33 oz    General.  Awake not in acute distress.  HEENT.  Pupils equal round react to light, anicteric, EOM intact.  Neck supple no JVD.  CVS regular rhythm no murmur gallops.  Lungs.  Clear to auscultation " bilateral no wheezing or rales.  Abdomen.  Soft nontender bowel sounds present.  Extremities.  No edema no calf tenderness.  Neurological.   General weakness  Skin no rash. No pallor.  Psych.impaired memory and cognition    Medical Decision Making       36 MINUTES SPENT BY ME on the date of service doing chart review, history, exam, documentation & further activities per the note.      Data         Imaging results reviewed over the past 24 hrs:   No results found for this or any previous visit (from the past 24 hour(s)).

## 2023-10-17 NOTE — PLAN OF CARE
Goal Outcome Evaluation:     Pt is A&O to self, assistx2 for transfers from bed to chair. Pt was up in chair a good part of afternoon. Bp at beginning of shift was low, notified MD, had IV place, pt received 400ml NS bolus, bp improved. !:1 d/c'd around 5pm. Pt was drowsy most of the shift, slept on/off, calm with cares. . At HS pt was awake and restless in bed, calling out. Writer gave HS meds in ice cream. Pt tucked in. A short time later pt was calling out, re-oriented. Pt offered HS snack, having turkey sandwich in bed with assist form NA. Denied pain, [place] J in place, denied numbness and tingling, eliezer LE edema. No s/s of infection or bleeding. New IV place this shift lost. Pt incontinent x2, small bm.   Problem: Plan of Care - These are the overarching goals to be used throughout the patient stay.    Goal: Optimal Comfort and Wellbeing  Outcome: Progressing  Intervention: Provide Person-Centered Care  Recent Flowsheet Documentation  Taken 10/16/2023 1553 by Eleonora Moreno, RN  Trust Relationship/Rapport:   care explained   choices provided   questions encouraged   questions answered   reassurance provided     Problem: Behavioral Health Comorbidity  Goal: Maintenance of Behavioral Health Symptom Control  Outcome: Progressing     Problem: Orthopaedic Fracture  Goal: Fracture Stability  Outcome: Progressing  Goal: Absence of Infection Signs and Symptoms  Outcome: Progressing  Goal: Effective Tissue Perfusion  Outcome: Progressing  Goal: Effective Oxygenation and Ventilation  Outcome: Progressing  Intervention: Promote Airway Secretion Clearance  Recent Flowsheet Documentation  Taken 10/16/2023 1553 by Eleonora Moreno RN  Activity Management: up in chair     Problem: Risk for Delirium  Goal: Improved Attention and Thought Clarity  Outcome: Progressing     Problem: Plan of Care - These are the overarching goals to be used throughout the patient stay.    Goal: Absence of Hospital-Acquired Illness or  Injury  Intervention: Identify and Manage Fall Risk  Recent Flowsheet Documentation  Taken 10/16/2023 1553 by Eleonora Moreno RN  Safety Promotion/Fall Prevention:   activity supervised   bedside attendant   supervised activity   patient and family education   nonskid shoes/slippers when out of bed     Problem: Orthopaedic Fracture  Goal: Optimal Functional Ability  Intervention: Optimize Functional Ability  Recent Flowsheet Documentation  Taken 10/16/2023 1553 by Eleonora Moreno RN  Activity Management: up in chair     Problem: Violence Risk or Actual  Goal: Anger and Impulse Control  Intervention: Minimize Safety Risk  Recent Flowsheet Documentation  Taken 10/16/2023 1553 by Eleonora Moreno RN  Enhanced Safety Measures:  at bedside     Problem: Risk for Delirium  Goal: Improved Behavioral Control  Intervention: Minimize Safety Risk  Recent Flowsheet Documentation  Taken 10/16/2023 1553 by Eleonora Moreno RN  Communication Enhancement Strategies: one-step directions provided  Enhanced Safety Measures:  at bedside  Trust Relationship/Rapport:   care explained   choices provided   questions encouraged   questions answered   reassurance provided     Problem: Seclusion/Restraint, Behavioral  Goal: Absence of Harm or Injury  Intervention: Protect Dignity, Rights, and Personal Wellbeing  Recent Flowsheet Documentation  Taken 10/16/2023 1553 by Eleonora Moreno RN  Trust Relationship/Rapport:   care explained   choices provided   questions encouraged   questions answered   reassurance provided     Problem: Restraint, Nonviolent  Goal: Absence of Harm or Injury  Intervention: Protect Dignity, Rights and Personal Wellbeing  Recent Flowsheet Documentation  Taken 10/16/2023 1553 by Eleonora Moreno RN  Trust Relationship/Rapport:   care explained   choices provided   questions encouraged   questions answered   reassurance provided     Problem: Suicide Risk  Goal: Absence of  Self-Harm  Intervention: Assess Risk to Self and Maintain Safety  Recent Flowsheet Documentation  Taken 10/16/2023 1553 by Eleonora Moreno, RN  Enhanced Safety Measures:  at bedside

## 2023-10-17 NOTE — PROGRESS NOTES
"Care Management Follow Up    Length of Stay (days): 16    Expected Discharge Date: 10/18/2023     Concerns to be Addressed:     memory care placement   Patient plan of care discussed at interdisciplinary rounds: Yes    Anticipated Discharge Disposition:  SNF with memory care      Anticipated Discharge Services:  memory care   Anticipated Discharge DME:  per treatment team     Patient/family educated on Medicare website which has current facility and service quality ratings:  yes  Education Provided on the Discharge Plan:  yes  Patient/Family in Agreement with the Plan:  yes    Referrals Placed by CM/SW:  post acute care facilities   Private pay costs discussed: Not applicable    Additional Information:  SW assisting with discharge planning.  Pt seeking SNF with memory care.  Pt off of 1:1 since 1700 on 10/16.    ELEN called and spoke with Jessica in admissions at [facility].  Jessica reports none of the [place] facilities have memory care openings at this time.     ELEN called and spoke with Pt's wife, Aparna to discuss need for additional choices.  Aparna is in agreement with referrals being sent to the following:  [facility]  [facility]    ELEN faxed referrals.     Social History:  \"Assessed, spoke to wife Aparna and Swapna MEIER at [facility] [phone]. Pt is unable to return to assist lvg at this time due to level of agitation and his required level of care. Per Swapna MEIER on call, At baseline, pt is able to reposition and transfer w/an assist of one. He can get to toilet on his own with walker and feed himself. Pt lives alone at [facility]. In order for pt to return to [facility], he would need to be able to transfer and ambulate w/walker independently. Pt is easily agitated at baseline and is, per Swapna, the most challenging resident they have. He has habit of " "hitting and impulsive with activity and falls often. He destroyed his room last night. Swapna states that he is so restless that he may require some form of sedation and would probably need his medications adjusted. Pt is  of [employer] and loves talking about it, for distraction conversation, it's recommended to ask him about his passion. \"     Kaycee York, ZACH      "

## 2023-10-17 NOTE — PROGRESS NOTES
[facility] called, they report patient is not appropriate for their [facility] at this time due to psychosocial behavioral issues.  If patient stabilizes they may consider in the future.

## 2023-10-17 NOTE — PLAN OF CARE
Problem: Behavioral Health Comorbidity  Goal: Maintenance of Behavioral Health Symptom Control  Outcome: Progressing  Intervention: Maintain Behavioral Health Symptom Control  Recent Flowsheet Documentation  Taken 10/17/2023 0100 by Swapna Mcgowan RN  Medication Review/Management: medications reviewed     Problem: Orthopaedic Fracture  Goal: Effective Bowel Elimination  Outcome: Progressing   Goal Outcome Evaluation:  Pt behavior fluctuating this shift.  Pt was calm at the start of the shift.  Pleasant.  Was incontinent of stool and combative and argumentative during cares.  Seroquel and melatonin given overnight to help with sleep and minimize behaviors.  Pt needed incontinence cares around 0500 and was able to be changed and cleaned with assist of 1 person.  More cooperative and directable.  Pt fell fast asleep after that.

## 2023-10-18 NOTE — PLAN OF CARE
Problem: Plan of Care - These are the overarching goals to be used throughout the patient stay.    Goal: Plan of Care Review  Outcome: Progressing     Goal Outcome Evaluation:  Patient has been alert, calm and cooperative this morning. Appreciative of cares provided. Talking about his family and his past. Allowed staff to bathe him and get him dressed. Fed himself 100% of his breakfast. Took all morning medications without difficulty. Kate cano on.  Lucia Gonzalez RN

## 2023-10-18 NOTE — PLAN OF CARE
Goal Outcome Evaluation:     Pt is A&O to self, does not use call light. Incontinent of B&B. Pt was up in chair at beginning of the shift, transferred into bed about 530pm, heavy assist of 2. Pt has great appetite but poor fluid intake. Fed himself dinner in bed. Pt fell asleep shortly after dinner and sleeping since. Ayush not wake pt for medications as pt gets agitated and restless. Denied pain, numbness and tingling, miami J collar in place, able to move extremities, pulses +. No s/s of infection, does not appear SOB, VSS  Problem: Plan of Care - These are the overarching goals to be used throughout the patient stay.    Goal: Optimal Comfort and Wellbeing  Outcome: Progressing     Problem: Behavioral Health Comorbidity  Goal: Maintenance of Behavioral Health Symptom Control  Outcome: Progressing     Problem: Orthopaedic Fracture  Goal: Absence of Infection Signs and Symptoms  Outcome: Progressing  Goal: Effective Tissue Perfusion  Outcome: Progressing  Goal: Optimal Pain Control and Function  Outcome: Progressing  Goal: Effective Oxygenation and Ventilation  Outcome: Progressing  Intervention: Promote Airway Secretion Clearance  Recent Flowsheet Documentation  Taken 10/17/2023 1555 by Eleonora Moreno RN  Activity Management:   up in chair   activity encouraged     Problem: Risk for Delirium  Goal: Improved Behavioral Control  Outcome: Progressing  Intervention: Minimize Safety Risk  Recent Flowsheet Documentation  Taken 10/17/2023 1555 by Eleonora Moreno RN  Communication Enhancement Strategies:   one-step directions provided   repetition utilized  Enhanced Safety Measures:   room near unit station   monitored by video     Problem: Plan of Care - These are the overarching goals to be used throughout the patient stay.    Goal: Absence of Hospital-Acquired Illness or Injury  Intervention: Identify and Manage Fall Risk  Recent Flowsheet Documentation  Taken 10/17/2023 1555 by Eleonora Moreno, RN  Safety  Promotion/Fall Prevention:   activity supervised   assistive device/personal items within reach   patient and family education   nonskid shoes/slippers when out of bed   supervised activity   room near nurse's station   room door open     Problem: Orthopaedic Fracture  Goal: Optimal Functional Ability  Intervention: Optimize Functional Ability  Recent Flowsheet Documentation  Taken 10/17/2023 1555 by Eleonora Moreno RN  Activity Management:   up in chair   activity encouraged  Positioning/Transfer Devices:   pillows   in use     Problem: Violence Risk or Actual  Goal: Anger and Impulse Control  Intervention: Minimize Safety Risk  Recent Flowsheet Documentation  Taken 10/17/2023 1555 by Eleonora Moreno RN  Sensory Stimulation Regulation:   care clustered   quiet environment promoted  Enhanced Safety Measures:   room near unit station   monitored by video     Problem: Risk for Delirium  Goal: Improved Attention and Thought Clarity  Intervention: Maximize Cognitive Function  Recent Flowsheet Documentation  Taken 10/17/2023 1555 by Eleonora Moreno RN  Sensory Stimulation Regulation:   care clustered   quiet environment promoted  Reorientation Measures:   clock in view   glasses use encouraged   reorientation provided     Problem: Suicide Risk  Goal: Absence of Self-Harm  Intervention: Assess Risk to Self and Maintain Safety  Recent Flowsheet Documentation  Taken 10/17/2023 1555 by Eleonora Moreno RN  Enhanced Safety Measures:   room near unit station   monitored by video     Problem: Confusion Chronic  Goal: Optimal Cognitive Function  Intervention: Minimize Injury Risk and Provide Safety  Recent Flowsheet Documentation  Taken 10/17/2023 1555 by Eleonora Moreno RN  Enhanced Safety Measures:   room near unit station   monitored by video  Intervention: Minimize and Manage Confusion  Recent Flowsheet Documentation  Taken 10/17/2023 1555 by Eleonora Moreno RN  Sensory Stimulation Regulation:   care clustered   quiet  environment promoted  Reorientation Measures:   clock in view   glasses use encouraged   reorientation provided

## 2023-10-18 NOTE — PROGRESS NOTES
Care Management Follow Up    Length of Stay (days): 17    Expected Discharge Date: 10/18/2023     Concerns to be Addressed:     memory care placement   Patient plan of care discussed at interdisciplinary rounds: Yes    Anticipated Discharge Disposition:  SNF with memory care      Anticipated Discharge Services:  memory care   Anticipated Discharge DME:  per treatment team     Patient/family educated on Medicare website which has current facility and service quality ratings:  yes  Education Provided on the Discharge Plan:  yes  Patient/Family in Agreement with the Plan:  yes    Referrals Placed by CM/SW:  post acute care facilities   Private pay costs discussed: Not applicable    Additional Information:  Pt seeking SNF with memory care.  Pt off of 1:1 since 1700 on 10/16.    10/17- Jessica in [facility] admissions reports none of the [place] facilities have memory care openings at this time.     10/17- ELEN spoke with pt's wife, Aparna, to discuss need for additional choices.  Aparna is in agreement with referrals being sent to the following:    [facility] (pending - now no beds available, possibly have an opening Friday, will review for appropriateness as late as Friday)  [facility] (declined- cannot meet patients needs, behavioral issues)  [facility] (declined- bed not available)  [facility] (declined- bed not available)  [facility] (declined - bed not available)  [facility] (pending - now declined, bed not available)  [facility] (declined - bed not available)       [facility], [facility] are not able to [facility] patient as they are not able to provide assistance of 2.  Hiram recommended [facility] [place] that provides higher level of care.   [facility]  [phone]  Fax: [phone]  CM faxed referral.       10/18- CM will call pending facilities and call wife for additional choices.     CM called wife.  CM informed wife of  "denial.  CM explained [facility] may accept patient.  Wife cannot drive far, asking for a facility close to [place].       CM searched online for group home availability.  [email or web address]    [facility]  ADDRESS:  [address] [place] [address]  PHONE:  [phone]  Email: [email or web address]  + CM called, they have availability, emailed information about patient.     [facility]  ADDRESS:  [address] [place] [address]  PHONE:  [phone]  Fax: [phone] - has availability, CM faxed patient information for review.        Additional places to call:  [facility]  ADDRESS:  [address] [place] [address]  PHONE:  [phone]    [facility]  ADDRESS:  [address] [place] [address]  PHONE:  [phone]    [facility]  ADDRESS:  [address] [place] [address]  PHONE:  [phone]    [facility]  ADDRESS:  [address] [place] [address]  PHONE:  [phone]    Others a little further away to call if needed:  [facility].  ADDRESS:  [address] [place] [address]  PHONE:  [phone]    [facility]  ADDRESS:  [address] [place] [address]  PHONE:  [phone]    [facility]  ADDRESS:  [address] [place] [address]  PHONE:  [phone]    [facility]  ADDRESS:  [address] [address]  PHONE:  [phone]    [facility]  ADDRESS:  [address] [place] [address]  PHONE:  [phone]    [facility]  ADDRESS:  [address] [place] [address]  PHONE:  [phone] / [phone]    [facility]  ADDRESS:  [address] [place] [address]  PHONE:  [phone]            Social History:  \"Assessed, spoke to wife Aparna and Swapna MEIER at [facility] [phone]. Pt is unable to return to assist lvg at this time due to level of agitation and his required " "level of care. Per Swapna MEIER on call, At baseline, pt is able to reposition and transfer w/an assist of one. He can get to toilet on his own with walker and feed himself. Pt lives alone at [facility]. In order for pt to return to [facility] [place], he would need to be able to transfer and ambulate w/walker independently. Pt is easily agitated at baseline and is, per Swapna, the most challenging resident they have. He has habit of hitting and impulsive with activity and falls often. He destroyed his room last night. Swapna states that he is so restless that he may require some form of sedation and would probably need his medications adjusted. Pt is  of [employer] and loves talking about it, for distraction conversation, it's recommended to ask him about his passion. \"     Zonia Calhoun RN      "

## 2023-10-18 NOTE — PROGRESS NOTES
[facility]    Medicine Progress Note - Hospitalist Service    Date of Admission:  9/29/2023    Assessment & Plan   88-year-old with severe dementia with behavioral disturbance who lives in a senior memory care unit at [facility] was brought in due to recurrent falls.  He sustained acute on chronic C7 and acute on chronic T3 fracture.  Neurosurgery was consulted and recommended conservative treatment.  Patient's behavior is not controlled with his home medications.  He required one-to-one sitter and nonpharmacological measures were unable to control his behavior.  Given recurrent falls and ongoing issues with behavior prognosis is guarded.  Patient is much calmer but gets agitated occasionally. Continue to reduced dose of Sinemet Sinemet to 5-50 over few weeks.  Awaiting placement for long term memory unit. Complicated with intermittent agitation, confusion.      Acute/subacute C7 and T3 fracture   Chronic T1 fracture  - S/P mechanical fall  - CT of head did not show any acute abnormality.   Spine imaging showed : superior endplate compression fracture of the C7 vertebrae with loss of 15% of vertebral body height. Subtle high signal change within the anterior/superior margin of the C7 vertebrae suggests that this fracture is acute/subacute in nature.   - Also found to have chronic compression fracture of the T1 vertebrae, acute/subacute T3 vertebrae #   Spine surgery consulted, recommend conservative management, cervical collar x 6-12 weeks time considering # is acute to subacute with follow up in 6 weeks time.    - D/C Ativan since it made his behavior worse.     Essential Hypertension with am hypotenison  - Norvasc at 2.5 mg po daily due to am hypotension, with holding parameters  - hydralazin iv prn for occasional hypertension   - monitor vital signs per protocol     Acute encephalopathy  - Baseline dementia with behavioral disturbance  - On seroquel 12.5 mg bid, 50 mg at bedtime, 25 mg bid  "prn for agitation; ramelteon 8 mg at bedtime, trazodone 100 mg at bedtime   - Continue IM Zyprexa 2.5 mg twice daily as needed for agitation.    - High-dose Sinemet was DC'd on 9/30.  Patient did have agitation on  of Sinemet.  As per his geriatrician plan we will continue to taper off Sinemet.    - Decrease Sinemet to 5-50 on 10/2/23.  Plan to do this more than 2 weeks, discussed with pharmacist  - no evidence of hypoglycemia.  -intermittent agitation and confusion     Neuropathy   - On lyrica 50 mg bid     History of a fib  - S/p pacemaker  - No anticoagulation because of fall     Weakness and deconditioning  - S/p recurrent falls  - Patient needs long-term care placement   -TCU refused due to agitation, encephalopathy and dementia.   -SW for long term memory care placement          Diet: Regular Diet Adult  Snacks/Supplements Adult: Ensure Enlive; Between Meals  Snacks/Supplements Adult: Magic Cup; Between Meals    DVT Prophylaxis: Enoxaparin (Lovenox) SQ  Manzo Catheter: Not present  Lines: None     Cardiac Monitoring: None  Code Status: No CPR- Do NOT Intubate      Clinically Significant Risk Factors                      # Dementia: noted on problem list    # Cachexia: Estimated body mass index is 17.98 kg/m  as calculated from the following:    Height as of this encounter: 1.93 m (6' 4\").    Weight as of this encounter: 67 kg (147 lb 11.3 oz).   # Moderate Malnutrition: based on nutrition assessment           Disposition Plan      Expected Discharge Date: 10/18/2023    Discharge Delays: Complex Discharge  Placement - LTC  *Medically Ready for Discharge    Discharge Comments: 1:1 off 1600 10/16        Discussed with SW: working on long term memory care placement  Called wife Aparna and addressed her concerns, especially low bp in morning. (Lower dose of Norvasc)    Delmy Daniels MD  Hospitalist Service  [facility]  Securely message with [employer] (more info)  Text page via [employer] " Paging/Directory   ______________________________________________________________________    Interval History   Confusion, poor historian; hallucination? Denies pain, no new complaints    Physical Exam   Vital Signs: Temp: 99.2  F (37.3  C) Temp src: Oral BP: 90/53 Pulse: 60   Resp: 18 SpO2: 96 % O2 Device: None (Room air)    Weight: 147 lbs 11.33 oz    General.  Awake confusion, disoriented not in acute distress.  HEENT.  Pupils equal round react to light, anicteric, EOM intact.  Neck wearing cervical collar  CVS regular rhythm no murmur gallops.  Lungs.  Clear to auscultation bilateral no wheezing or rales.  Abdomen.  Soft nontender bowel sounds present.  Extremities.  No edema no calf tenderness.  Neurological.  Awake and alert. No focal deficit.  Skin no rash. No pallor.  Psych. Impaired memory and cognition     Medical Decision Making       45 MINUTES SPENT BY ME on the date of service doing chart review, history, exam, documentation & further activities per the note.      Data         Imaging results reviewed over the past 24 hrs:   No results found for this or any previous visit (from the past 24 hour(s)).

## 2023-10-19 NOTE — PLAN OF CARE
Problem: Plan of Care - These are the overarching goals to be used throughout the patient stay.    Goal: Optimal Comfort and Wellbeing  Outcome: Progressing   Goal Outcome Evaluation: Denies pain    Patient became combative during hygiene cares: kicking, hitting, squeezing, spitting. Spit zazueta, multiple staff utilized. Zyprexa IM given and patient went to sleep.

## 2023-10-19 NOTE — PLAN OF CARE
Problem: Oral Intake Inadequate  Goal: Improved Oral Intake  Outcome: Progressing  Intervention: Promote and Optimize Oral Intake  Flowsheets (Taken 10/19/2023 1424)  Nutrition Interventions:   supplemental drinks provided   supplemental foods provided  Oral Nutrition Promotion: calorie-dense foods provided   Goal Outcome Evaluation:  Pt tolerating regular diet with Ensure + magic cup daily. Pt meeting >75% estimated nutrition needs. No new wt loss x6 months, clinically significant wt loss x1 year. Pt awaiting placement.

## 2023-10-19 NOTE — PLAN OF CARE
"Goal Outcome Evaluation:      Problem: Plan of Care - These are the overarching goals to be used throughout the patient stay.    Goal: Patient-Specific Goal (Individualized)  Description: You can add care plan individualizations to a care plan. Examples of Individualization might be:  \"Parent requests to be called daily at 9am for status\", \"I have a hard time hearing out of my right ear\", or \"Do not touch me to wake me up as it startles me\".  Outcome: Progressing     Problem: Plan of Care - These are the overarching goals to be used throughout the patient stay.    Goal: Optimal Comfort and Wellbeing  Outcome: Progressing     Problem: Orthopaedic Fracture  Goal: Optimal Pain Control and Function  Outcome: Progressing    Alert and oriented to self and family. Vital signs stable. Good oral intake. Had large BM. Was calm for most of the shift. Was agitated at 1230 and started calling his wife repeatedly. Scheduled Seroquel was given which was somewhat effective. Wife came at 1330 and patient was calm since then.                            "

## 2023-10-19 NOTE — PROGRESS NOTES
Writer helped RNCM call FPC/LTC facilities that have Memory Care options calls made between 2:30pm-3:00pm today 10/19:      [facility]- Left Vm awaiting call back.     [facility]- Left Vm awaiting call back.     [facility]- Left Vm awaiting call back.     [facility]- Left Vm awaiting call back.     [facility]- Left Vm awaiting call back.  Supervisor # [phone]      [facility]- Declined pt , feels pt needs 1 on1 care.       [facility]- Left Vm awaiting call back.     [facility]-Left Vm awaiting call back.       [facility]- Declined pt, but advised to send referral to the [facility] location, writer hand faxed over, then realized they had declined prior.     [facility]- Left Vm awaiting call back.       Writer sent referral to [facility] this afternoon (pending).       Aparna Reddy RN on 10/19/2023 at 3:17 PM

## 2023-10-19 NOTE — PROGRESS NOTES
Care Management Follow Up    Length of Stay (days): 18    Expected Discharge Date: 10/19/2023     Concerns to be Addressed:     memory care placement   Patient plan of care discussed at interdisciplinary rounds: Yes    Anticipated Discharge Disposition:  SNF with memory care      Anticipated Discharge Services:  memory care   Anticipated Discharge DME:  per treatment team     Patient/family educated on Medicare website which has current facility and service quality ratings:  yes  Education Provided on the Discharge Plan:  yes  Patient/Family in Agreement with the Plan:  yes    Referrals Placed by CM/SW:  post acute care facilities   Private pay costs discussed: Not applicable    Additional Information:  Pt seeking SNF with memory care.  Pt off of 1:1 since 1700 on 10/16.    10/17- Jessica in [place] admissions reports none of the [place] facilities have memory care openings at this time.     10/17- ELEN spoke with pt's wife, Aparna, to discuss need for additional choices.  Aparna is in agreement with referrals being sent to the following:    [facility] (pending - now no beds available, possibly have an opening Friday, will review for appropriateness as late as Friday)  [facility] (declined- cannot meet patients needs, behavioral issues)  [facility] (declined- bed not available)  [facility] (declined- bed not available)  [facility] ([facility]ined - [facility])  [facility] (pending - now declined, bed not available)  [facility] (declined - bed not available)         [facility] referral.   Accepts higher level of care.  Has opening on 10/27.  Cannot admit with aggressive behaviors, needs to show at least 2 days of appropriate behaviors.   [phone]  Fax: [phone]  10/19- Discussed with Ajay Robison [facility] needs nurse notes from [facility].  CM will discuss with wife and [facility].     10/19- per nurse notes,  patient became aggressive, kicking, hitting, squeezing, and spitting.  IM Zyprexa administered.     10/19- CM resubmitted referral to [facility], [facility] and [facility], and [facility] for [facility].       10/18- CM explained to wife [facility] may accept patient.  Wife cannot drive far, asking for a facility close to [place].     10/18- CM searched online for group home availability.  [email or web address]    [facility]  ADDRESS:  [address] [place] [address]  PHONE:  [phone]  Email: [email or web address]  + CM called, they have availability, emailed information about patient.     [facility]  ADDRESS:  [address] [id number]  PHONE:  [phone]  Fax: [phone] - has availability, CM faxed patient information for review.        Additional places to call:  [facility]  ADDRESS:  [address] [place] [address]  PHONE:  [phone]    [facility]  ADDRESS:  [address] [place] [address]  PHONE:  [phone]    [facility]  ADDRESS:  [address] [id number]  PHONE:  [phone]    [facility]  ADDRESS:  [address] [place] [address]  PHONE:  [phone]    Others a little further away to call if needed:  [facility].  ADDRESS:  [address] [place] [address]  PHONE:  [phone]    [facility]  ADDRESS:  [address] [place] [address]  PHONE:  [phone]    [facility]  ADDRESS:  [address] [address]  PHONE:  [phone]    [facility]  ADDRESS:  [address] [address]  PHONE:  [phone]    [facility]  ADDRESS:  [address] [place] [address]  PHONE:  [phone]    [facility]  ADDRESS:  [address] [place] [address]  PHONE:  [phone] / [phone]    [facility]  "[facility]  ADDRESS:  [address] [place], [place] [id number]  PHONE:  [phone]            Social History:  \"Assessed, spoke to wife Aparna and Swapna MEIER at [facility] [phone]. Pt is unable to return to assist lvg at this time due to level of agitation and his required level of care. Per Swapna MEIER on call, At baseline, pt is able to reposition and transfer w/an assist of one. He can get to toilet on his own with walker and feed himself. Pt lives alone at [facility]. In order for pt to return to [place], he would need to be able to transfer and ambulate w/walker independently. Pt is easily agitated at baseline and is, per Swapna, the most challenging resident they have. He has habit of hitting and impulsive with activity and falls often. He destroyed his room last night. Swapna states that he is so restless that he may require some form of sedation and would probably need his medications adjusted. Pt is  of [employer] and loves talking about it, for distraction conversation, it's recommended to ask him about his passion. \"     Zonia Calhoun RN      "

## 2023-10-19 NOTE — PROGRESS NOTES
CLINICAL NUTRITION SERVICES - SHORT NOTE     Nutrition Prescription    RECOMMENDATIONS FOR MDs/PROVIDERS TO ORDER:    Malnutrition Status:    Moderate malnutrition In Context of: Acute on Chronic illness or disease    Recommendations already ordered by Registered Dietitian (RD):  Continue ensure and magic cup daily     Future/Additional Recommendations:  Monitor supplement/po chely., wt, I/Os.      EVALUATION OF THE PROGRESS TOWARD GOALS   Diet: Regular  Nutrition Supplement: Ensure enlive daily, Magic Cup daily   Intake: Good    10/18 100% x2 meals, 10/17 % x3 meals, 10/16 % x3 meals   Pt consuming at minimum 1500 kcals, 75 gm protein/day, Meeting >75% estimated nutrition needs.      NEW FINDINGS   Met with pt at bedside this afternoon. Pt with poor memory per chart review- unreliable source for nutrition hx. Pt did report good po intake at breakfast, reports tolerating Ensure enlive. Pt states not received Magic cup yet, ordered to be sent at 2 PM today. Pt reports no N/V/abdominal pain.     Pt awaiting placement.     Weight History: Clinically significant wt loss of 20.6% weight loss x 10 months, no recent losses   GI: flat abdomen   I/Os: x1 BM this AM   Edema: Trace  Labs: reviewed   Meds: Scheduled norvasc, MVI/M, seroquel, senna, desyrel     MALNUTRITION:   % Weight Loss:  > 20% in 1 year (severe malnutrition)  % Intake:  No decreased intake noted  Subcutaneous Fat Loss:  Orbital region moderate-severe depletion  Muscle Loss:  Temporal region moderate depletion, Clavicle bone region severe depletion, and Acromion bone region moderate depletion  Fluid Retention:  Trace    Malnutrition Diagnosis: Moderate malnutrition  In Context of:  Acute on Chronic illness or disease- Improving     INTERVENTIONS  Implementation  Emphasized adequate oral intakes   Continue ensure and magic cup daily     Monitoring/Evaluation  Progress toward goals will be monitored and evaluated per protocol.

## 2023-10-19 NOTE — PROGRESS NOTES
[facility]    Medicine Progress Note - Hospitalist Service    Date of Admission:  9/29/2023    Assessment & Plan   88-year-old with severe dementia with behavioral disturbance who lives in a senior memory care unit at [facility] was brought in due to recurrent falls.  He sustained acute on chronic C7 and acute on chronic T3 fracture.  Neurosurgery was consulted and recommended conservative treatment.  Patient's behavior is not controlled with his home medications.  He required one-to-one sitter and nonpharmacological measures were unable to control his behavior.  Given recurrent falls and ongoing issues with behavior prognosis is guarded.  Patient is much calmer but gets agitated occasionally. Continue to reduced dose of Sinemet Sinemet to 5-50 over few weeks.  Awaiting placement for long term memory unit. Complicated with intermittent agitation, confusion.      Acute/subacute C7 and T3 fracture   Chronic T1 fracture  - S/P mechanical fall  - CT of head did not show any acute abnormality.   Spine imaging showed : superior endplate compression fracture of the C7 vertebrae with loss of 15% of vertebral body height. Subtle high signal change within the anterior/superior margin of the C7 vertebrae suggests that this fracture is acute/subacute in nature.   - Also found to have chronic compression fracture of the T1 vertebrae, acute/subacute T3 vertebrae   Spine surgery consulted, recommend conservative management, cervical collar x 6-12 weeks time considering  is acute to subacute with follow up in 6 weeks time.    - D/Manish Ativan since it made his behavior worse.  -As needed Zyprexa     Essential Hypertension with am hypotenison  - Norvasc at 2.5 mg po daily due to am hypotension, with holding parameters  - hydralazin iv prn for occasional hypertension   - monitor vital signs per protocol     Acute encephalopathy-resolving  - Baseline dementia with behavioral disturbance  - On seroquel 12.5 mg  "bid, 50 mg at bedtime, 25 mg bid prn for agitation; ramelteon 8 mg at bedtime, trazodone 100 mg at bedtime   - Continue IM Zyprexa 2.5 mg twice daily as needed for agitation.    - High-dose Sinemet was DC'd on 9/30.  Patient did have agitation on  of Sinemet.  As per his geriatrician plan we will continue to taper off Sinemet.    - Decrease Sinemet to 5-50 on 10/2/23.  Plan to do this more than 2 weeks, discussed with pharmacist  - no evidence of hypoglycemia.  -intermittent agitation and confusion, responding well to IM Zyprexa     Neuropathy   - On lyrica 50 mg bid     History of a fib  - S/p pacemaker  - No anticoagulation because of fall     Weakness and deconditioning  - S/p recurrent falls  - Patient needs long-term care placement   -TCU refused due to agitation, encephalopathy and dementia.   -SW for long term memory care placement    Diet: Regular Diet Adult  Snacks/Supplements Adult: Ensure Enlive; Between Meals  Snacks/Supplements Adult: Magic Cup; Between Meals    DVT Prophylaxis: Enoxaparin (Lovenox) SQ  Manzo Catheter: Not present  Lines: None     Cardiac Monitoring: None  Code Status: No CPR- Do NOT Intubate      Clinically Significant Risk Factors                      # Dementia: noted on problem list    # Cachexia: Estimated body mass index is 17.98 kg/m  as calculated from the following:    Height as of this encounter: 1.93 m (6' 4\").    Weight as of this encounter: 67 kg (147 lb 11.3 oz).   # Moderate Malnutrition: based on nutrition assessment        Disposition Plan    Expected Discharge Date: 10/19/2023    Discharge Delays: Complex Discharge  Placement - LTC  *Medically Ready for Discharge    Discharge Comments: 1:1 off 1600 10/16        Discussed with SW: working on long term memory care placement  Called wife Aparna and addressed her concerns, especially low bp in morning. (Lower dose of Norvasc)    Molly Guerin MD  Hospitalist Service  [facility]  Securely " message with Suzy (more info)  Text page via [facility] Paging/Directory   ______________________________________________________________________    Interval History   Patient is new to me.  Chart reviewed.  Patient seen and examined.  He sitting in the chair, with thighs pressed against his chest.  Patient complaining of feeling cold.  Warm blanket provided.  No other complaints.  D/W nursing, case management.  Awaiting placement.    Physical Exam   Vital Signs: Temp: 98.1  F (36.7  C) Temp src: Oral BP: 133/83 Pulse: 86   Resp: 17 SpO2: 99 % O2 Device: None (Room air)    Weight: 147 lbs 11.33 oz    General.  Awake confusion, disoriented not in acute distress.  HEENT.  Pupils equal round react to light, anicteric, EOM intact.  Neck wearing cervical collar  CVS regular rhythm no murmur gallops.  Lungs.  Clear to auscultation bilateral no wheezing or rales.  Abdomen.  Soft nontender bowel sounds present.  Extremities.  No edema no calf tenderness.  Neurological.  Awake and alert. No focal deficit.  Skin no rash. No pallor.  Psych. Impaired memory and cognition     Medical Decision Making       45 MINUTES SPENT BY ME on the date of service doing chart review, history, exam, documentation & further activities per the note.      Data         Imaging results reviewed over the past 24 hrs:   No results found for this or any previous visit (from the past 24 hour(s)).

## 2023-10-19 NOTE — PLAN OF CARE
"Goal Outcome Evaluation:      Problem: Plan of Care - These are the overarching goals to be used throughout the patient stay.    Goal: Patient-Specific Goal (Individualized)  Description: You can add care plan individualizations to a care plan. Examples of Individualization might be:  \"Parent requests to be called daily at 9am for status\", \"I have a hard time hearing out of my right ear\", or \"Do not touch me to wake me up as it startles me\".  Outcome: Progressing     Problem: Plan of Care - These are the overarching goals to be used throughout the patient stay.    Goal: Optimal Comfort and Wellbeing  Outcome: Progressing    Alert and oriented to self and family. VSS. Denied pain. Cooperated with cares. Assist of two with ADLs. Able to feed self for meals.                       "

## 2023-10-20 NOTE — PLAN OF CARE
"Goal Outcome Evaluation:      Problem: Plan of Care - These are the overarching goals to be used throughout the patient stay.    Goal: Patient-Specific Goal (Individualized)  Description: You can add care plan individualizations to a care plan. Examples of Individualization might be:  \"Parent requests to be called daily at 9am for status\", \"I have a hard time hearing out of my right ear\", or \"Do not touch me to wake me up as it startles me\".  Outcome: Progressing     Problem: Behavioral Health Comorbidity  Goal: Maintenance of Behavioral Health Symptom Control  Outcome: Progressing     Problem: Orthopaedic Fracture  Goal: Optimal Functional Ability  Outcome: Progressing  Intervention: Optimize Functional Ability  Alert and oriented to self. VSS. Denied pain. Able to feed self. 1:1 sitter discontinued at 9 am. Pt was calm and up to chair for most of the AM shift. Was very agitated and angry around 1430. PRN Seroquel 25 mg was given which effective. Took nap for about an hour afterwards. Generally calm and cooperated when awake.      "

## 2023-10-20 NOTE — PROGRESS NOTES
1:1 discontinued at 9am. Patient calm sitting up in his recliner. Took morning medications and ate his breakfast. Chair alarm on, curtain pulled back. Will monitor closely.  Lucia Gonzalez RN

## 2023-10-20 NOTE — PLAN OF CARE
Problem: Plan of Care - These are the overarching goals to be used throughout the patient stay.    Goal: Plan of Care Review  Description: The Plan of Care Review/Shift note should be completed every shift.  The Outcome Evaluation is a brief statement about your assessment that the patient is improving, declining, or no change.  This information will be displayed automatically on your shift note.  Outcome: Progressing     Problem: Orthopaedic Fracture  Goal: Absence of Bleeding  Outcome: Progressing     Problem: Confusion Chronic  Goal: Optimal Cognitive Function  Outcome: Progressing  Intervention: Minimize Injury Risk and Provide Safety  Recent Flowsheet Documentation  Taken 10/20/2023 0141 by Louann Siu RN  Enhanced Safety Measures: room near unit station   Goal Outcome Evaluation:         Pt agitated,restless and combative this shift,pt refused hs meds per report,given hs Trazodone,Seroquel and Remelteon given with several attempts with no effect,pt continued to be restless,pt placed on 1:1 supervision for safety,pt was awake all night,incontinent of urine x 2.

## 2023-10-20 NOTE — PROGRESS NOTES
Care Management Follow Up    Length of Stay (days): 19    Expected Discharge Date: 10/21/2023     Concerns to be Addressed:     memory care placement   Patient plan of care discussed at interdisciplinary rounds: Yes    Anticipated Discharge Disposition:  SNF with memory care      Anticipated Discharge Services:  memory care   Anticipated Discharge DME:  per treatment team     Patient/family educated on Medicare website which has current facility and service quality ratings:  yes  Education Provided on the Discharge Plan:  yes  Patient/Family in Agreement with the Plan:  yes    Referrals Placed by CM/SW:  post acute care facilities   Private pay costs discussed: Not applicable    Additional Information:  Pt seeking SNF with memory care.  Patient has not walked since 10/9  Pt was off of 1:1 from 10/16 PM to 10/20 AM and back on for a short time (few hours) and is now back off 1:1.    10/17- Jessica in [place] admissions reports none of the [facility] have memory care openings at this time.     10/17- ELEN spoke with pt's wife, Aparna, to discuss need for additional choices.  Aparna is in agreement with referrals being sent to the following:    [facility] (pending - now no beds available, possibly have an opening Friday, will review for appropriateness as late as Friday)  [facility] (declined- cannot meet patients needs, behavioral issues)  [facility] (declined- bed not available)  [facility] ([facility]ined- [facility] not [facility])  [facility] ([facility]ined - [facility] not available)  [facility] (pending - now declined, bed not available)  [facility] (declined - bed not available)       Enriqueta Parra  10/18- CM faxed referral.   Accepts higher level of care.  Has opening on 10/27.  Cannot admit with aggressive behaviors, needs to show at least 2 days of appropriate behaviors.   [phone]  Fax: [phone]  10/19- Discussed with Ajay Robison  needs nurse notes from [facility].  CM will discuss with wife and [facility].           10/20 1500- CM called [facility]uera back #[phone].  They believe they are able to take him.  They would need wife to visit to agree with placement.   Wife is not interested in going to [place].      1510- CM called [facility] to discuss getting information over to [facility].  Left voicemail for Swapna.     CM called Wife Aparna, Aparna is interested in [facility] [place]  CM called Enriqueta Robison is expecting to get more information from [facility] on Monday.  CM will follow up with Enriqueta on Monday.          10/20 AM: Per nurse notes, patient agitated, restless, and combative.  Patient refused HS medications, awake all night.  Patient placed back on  1:1 observation for safety and redirection. Patient came off 1:1 at 0900.    10/19- per nurse notes, patient became aggressive, kicking, hitting, squeezing, and spitting.  IM Zyprexa administered.     10/19- CM resubmitted referral to [facility] and [facility], and [facility] for [facility].       10/18- CM explained to wife [facility] may accept patient.  Wife cannot drive far, asking for a facility close to [place].     10/18- CM searched online for group home availability.  [email or web address]    [facility]  ADDRESS:  [address] [place] [address]  PHONE:  [phone]  Email: [email or web address]  + CM called, they have availability, emailed information about patient.     [facility]  ADDRESS:  [address] [place] [address]  PHONE:  [phone]  Fax: [phone]  - Has availability, CM faxed patient information for review.        Additional places to call:  [facility]  ADDRESS:  [address] [place] [address]  PHONE:  [phone]    [facility]  ADDRESS:  [address] [place], [place]  "[address]  PHONE:  [phone]    [facility]  ADDRESS:  [address] [place] [address]  PHONE:  [phone]    [facility]  ADDRESS:  [address] [place] [address]  PHONE:  [phone]    Others a little further away to call if needed:  [facility] [place]  ADDRESS:  [address] [place] [address]  PHONE:  [phone]    [facility]  ADDRESS:  [address] [place] [address]  PHONE:  [phone]    [facility]  ADDRESS:  [address] [address]  PHONE:  [phone]    [facility]  ADDRESS:  [address] [address]  PHONE:  [phone]    [facility]  ADDRESS:  [address] [place] [address]  PHONE:  [phone]    [facility]  ADDRESS:  [address] [place] [address]  PHONE:  [phone] / [phone]    [facility]  ADDRESS:  [address] [place] [address]  PHONE:  [phone]            Social History:  \"Assessed, spoke to wife Aparna and Swapna MEIER at [facility] [phone]. Pt is unable to return to assist g at this time due to level of agitation and his required level of care. Per Swapna MEIER on call, At baseline, pt is able to reposition and transfer w/an assist of one. He can get to toilet on his own with walker and feed himself. Pt lives alone at [facility]. In order for pt to return to [place], he would need to be able to transfer and ambulate w/walker independently. Pt is easily agitated at baseline and is, per Swapna, the most challenging resident they have. He has habit of hitting and impulsive with activity and falls often. He destroyed his room last night. Swapna states that he is so restless that he may require some form of sedation and would probably need his medications adjusted. Pt is  of [employer] and loves talking about it, for distraction conversation, it's recommended to ask him about his " "passion. \"     [place]:   [phone].     Zonia Calhoun RN      "

## 2023-10-20 NOTE — PROGRESS NOTES
[facility]    Medicine Progress Note - Hospitalist Service    Date of Admission:  9/29/2023    Assessment & Plan   Rosemary Farmer is a 88-year-old with severe dementia with behavioral disturbance who lives in a senior memory care unit at [place] was brought in due to recurrent falls.  He sustained acute on chronic C7 and acute on chronic T3 fracture.  Neurosurgery was consulted and recommended conservative treatment.  Patient's behavior is not controlled with his home medications.  He required one-to-one sitter and nonpharmacological measures were unable to control his behavior.  Given recurrent falls and ongoing issues with behavior prognosis is guarded.  Patient is much calmer but gets agitated occasionally. Continue to reduced dose of Sinemet Sinemet to 5-50 over few weeks.  Awaiting placement for long term memory unit. Complicated with intermittent agitation, confusion.     10/20: Required one-to-one sitter overnight.  Doing better in the morning and one-to-one discontinued, will monitor.  20-day certification required     Acute/subacute C7 and T3 fracture   Chronic T1 fracture  - S/P mechanical fall  - CT of head did not show any acute abnormality.   Spine imaging showed : superior endplate compression fracture of the C7 vertebrae with loss of 15% of vertebral body height. Subtle high signal change within the anterior/superior margin of the C7 vertebrae suggests that this fracture is acute/subacute in nature.   - Also found to have chronic compression fracture of the T1 vertebrae, acute/subacute T3 vertebrae   Spine surgery consulted, recommend conservative management, cervical collar x 6-12 weeks time considering  is acute to subacute with follow up in 6 weeks time.    - D/Manish Ativan since it made his behavior worse.  -As needed Zyprexa     Essential Hypertension with am hypotenison  - Norvasc at 2.5 mg po daily due to am hypotension, with holding parameters  - hydralazin iv prn for  "occasional hypertension   - monitor vital signs per protocol     Acute encephalopathy-resolving  - Baseline dementia with behavioral disturbance  - On seroquel 12.5 mg bid, 50 mg at bedtime, 25 mg bid prn for agitation; ramelteon 8 mg at bedtime, trazodone 100 mg at bedtime   - Continue IM Zyprexa 2.5 mg twice daily as needed for agitation.    - High-dose Sinemet was DC'd on 9/30.  Patient did have agitation on  of Sinemet.  As per his geriatrician plan we will continue to taper off Sinemet.    - Decrease Sinemet to 5-50 on 10/2/23.  Plan to do this more than 2 weeks, discussed with pharmacist  - no evidence of hypoglycemia.  -intermittent agitation and confusion, responding well to IM Zyprexa     Neuropathy   - On lyrica 50 mg bid     History of a fib  - S/p pacemaker  - No anticoagulation because of fall     Weakness and deconditioning  - S/p recurrent falls  - Patient needs long-term care placement   -TCU refused due to agitation, encephalopathy and dementia.   -SW for long term memory care placement        Diet: Regular Diet Adult  Snacks/Supplements Adult: Ensure Enlive; Between Meals  Snacks/Supplements Adult: Magic Cup; Between Meals    DVT Prophylaxis: Enoxaparin (Lovenox) SQ  Manzo Catheter: Not present  Lines: None     Cardiac Monitoring: None  Code Status: No CPR- Do NOT Intubate      Clinically Significant Risk Factors                      # Dementia: noted on problem list    # Cachexia: Estimated body mass index is 17.98 kg/m  as calculated from the following:    Height as of this encounter: 1.93 m (6' 4\").    Weight as of this encounter: 67 kg (147 lb 11.3 oz).   # Moderate Malnutrition: based on nutrition assessment           Disposition Plan      Expected Discharge Date: 10/23/2023    Discharge Delays: Complex Discharge  Placement - LTC  *Medically Ready for Discharge  1:1 Sitter still ordered - MD to assess    Discharge Comments: 1:1 off 1600 10/16  had IM 10/19            Annia Rosas, " MD  Hospitalist Service  [facility]  Securely message with [employer] (more info)  Text page via [employer] Paging/Directory   ______________________________________________________________________    Interval History   Patient is new to me today.  Medications reviewed, most recent labs mainly from 10/6 reviewed.  He was resting comfortably and having breakfast when I saw him this morning.  He seems confused and does not always answer questions appropriately.  Very cooperative and pleasant.  Was taken off one-to-one this morning after being on it overnight due to agitation.  Discussed with social work.  Will not be able to go to memory care unless one-to-one is for safety or wandering not for agitation.  We will continue to monitor this.  Blood pressures elevated today but was very low 2 days ago.  We will continue to monitor and adjust blood pressure medications if needed.    Physical Exam   Vital Signs: Temp: 97.9  F (36.6  C) Temp src: Oral BP: (!) 156/76 Pulse: 82   Resp: 18 SpO2: 98 % O2 Device: None (Room air)    Weight: 147 lbs 11.33 oz    General Appearance: Awake, alert, in no acute distress  Respiratory: CTAB, no wheeze  Cardiovascular: RRR  GI: soft, nontender, non distended, normal bowel sounds  Skin: no jaundice, no rash      Medical Decision Making       45 MINUTES SPENT BY ME on the date of service doing chart review, history, exam, documentation & further activities per the note.      Data         Imaging results reviewed over the past 24 hrs:   No results found for this or any previous visit (from the past 24 hour(s)).

## 2023-10-20 NOTE — PLAN OF CARE
Problem: Risk for Delirium  Goal: Improved Attention and Thought Clarity  Outcome: Not Progressing     Problem: Risk for Delirium  Goal: Improved Behavioral Control  Outcome: Progressing  Intervention: Minimize Safety Risk  Recent Flowsheet Documentation  Taken 10/19/2023 1617 by Kasandra Alvarado RN  Enhanced Safety Measures: room near unit station     Problem: Orthopaedic Fracture  Goal: Optimal Pain Control and Function  Outcome: Progressing     Goal Outcome Evaluation:       Pt presented to the hospital on 9/29 with severe dementia with behavioral issues and frequent falls. Nonsurgical C7 and T5 fractures. Cervical collar on at all times. Pt has severe dementia. A&O to self. Remains pleasant this shift but very restless and difficult to redirect. Setting off bed alarms and calling out for staff frequently. Pt does not follow cues. Refused all hs meds. Assist of 2 with transfers. Pt is weak. Denies pain. Appetite is adequate. Needs assist with meals. Tolerating a regular diet. Takes medications crushed in pudding. Incontinent of bowel and bladder. Pt had a large bowel movement today. Bowel meds held this shift.

## 2023-10-21 NOTE — PLAN OF CARE
Problem: Plan of Care - These are the overarching goals to be used throughout the patient stay.    Goal: Plan of Care Review  Description: The Plan of Care Review/Shift note should be completed every shift.  The Outcome Evaluation is a brief statement about your assessment that the patient is improving, declining, or no change.  This information will be displayed automatically on your shift note.  Outcome: Progressing     Problem: Behavioral Health Comorbidity  Goal: Maintenance of Behavioral Health Symptom Control  Outcome: Progressing     Problem: Orthopaedic Fracture  Goal: Fracture Stability  Outcome: Progressing     Problem: Orthopaedic Fracture  Goal: Effective Oxygenation and Ventilation  Outcome: Progressing  Intervention: Promote Airway Secretion Clearance  Recent Flowsheet Documentation  Taken 10/21/2023 0030 by Owen Ruiz RN  Activity Management: activity adjusted per tolerance  Taken 10/20/2023 2030 by Owen Ruiz RN  Activity Management: activity adjusted per tolerance  Intervention: Optimize Oxygenation and Ventilation  Recent Flowsheet Documentation  Taken 10/21/2023 0030 by Owen Ruiz RN  Head of Bed (HOB) Positioning: HOB at 20-30 degrees  Taken 10/20/2023 2030 by Owen Ruiz RN  Head of Bed (HOB) Positioning: HOB at 20-30 degrees     Problem: Confusion Chronic  Goal: Optimal Cognitive Function  Outcome: Progressing   Goal Outcome Evaluation:       Pt alert & oriented to self. Cervical collar in place. Take medications crushed with pudding. Denies any pain. Calm & cooperative with cares. On room air, VSS. Will continue to monitor.

## 2023-10-21 NOTE — PLAN OF CARE
Pt is alert and oriented to self, unable to effectively communicate needs, assist of 2 with gaitbelt and walker. Pt was restless this morning feeling like he needed to go somewhere, Pt was walked in the wisdom with assist of 2 with walker and followed with wheelchair, Pt walked about 50ft. After walking Pt napped for a couple hours and was up to chair for lunch. Pt behavior has been pleasant, cooperative, and easily redirectable. Pt takes meds crushed in pudding or melted ice cream.

## 2023-10-22 NOTE — PROGRESS NOTES
[facility]    Medicine Progress Note - Hospitalist Service    Date of Admission:  9/29/2023    Assessment & Plan   Rosemary Farmer is a 88-year-old with severe dementia with behavioral disturbance who lives in a senior memory care unit at [place] was brought in due to recurrent falls.  He sustained acute on chronic C7 and acute on chronic T3 fracture.  Neurosurgery was consulted and recommended conservative treatment.  Patient's behavior is not controlled with his home medications.  He required one-to-one sitter and nonpharmacological measures were unable to control his behavior.  Given recurrent falls and ongoing issues with behavior prognosis is guarded.  Patient is much calmer but gets agitated occasionally. Continue to reduced dose of Sinemet Sinemet to 5-50 over few weeks.  Awaiting placement for long term memory unit. Complicated with intermittent agitation, confusion.    10/21: Has not been requiring a one-to-one sitter today, will continue to monitor    10/20: Required one-to-one sitter overnight.  Doing better in the morning and one-to-one discontinued, will monitor.  20-day certification required     Acute/subacute C7 and T3 fracture   Chronic T1 fracture  - S/P mechanical fall  - CT of head did not show any acute abnormality.   Spine imaging showed : superior endplate compression fracture of the C7 vertebrae with loss of 15% of vertebral body height. Subtle high signal change within the anterior/superior margin of the C7 vertebrae suggests that this fracture is acute/subacute in nature.   - Also found to have chronic compression fracture of the T1 vertebrae, acute/subacute T3 vertebrae   Spine surgery consulted, recommend conservative management, cervical collar x 6-12 weeks time considering  is acute to subacute with follow up in 6 weeks time.    - D/Manish Ativan since it made his behavior worse.  -As needed Zyprexa     Essential Hypertension with am hypotenison  - Norvasc at 2.5 mg  "po daily due to am hypotension, with holding parameters  - hydralazin iv prn for occasional hypertension   - monitor vital signs per protocol     Acute encephalopathy-resolving  - Baseline dementia with behavioral disturbance  - On seroquel 12.5 mg bid, 50 mg at bedtime, 25 mg bid prn for agitation; ramelteon 8 mg at bedtime, trazodone 100 mg at bedtime   - Continue IM Zyprexa 2.5 mg twice daily as needed for agitation.    - High-dose Sinemet was DC'd on 9/30.  Patient did have agitation on  of Sinemet.  As per his geriatrician plan we will continue to taper off Sinemet.    - Decrease Sinemet to 5-50 on 10/2/23.  Plan to do this more than 2 weeks, discussed with pharmacist  - no evidence of hypoglycemia.  -intermittent agitation and confusion, responding well to IM Zyprexa     Neuropathy   - On lyrica 50 mg bid     History of a fib  - S/p pacemaker  - No anticoagulation because of fall     Weakness and deconditioning  - S/p recurrent falls  - Patient needs long-term care placement   -TCU refused due to agitation, encephalopathy and dementia.   -SW for long term memory care placement          Diet: Regular Diet Adult  Snacks/Supplements Adult: Ensure Enlive; Between Meals  Snacks/Supplements Adult: Magic Cup; Between Meals    DVT Prophylaxis: Enoxaparin (Lovenox) SQ  Manzo Catheter: Not present  Lines: None     Cardiac Monitoring: None  Code Status: No CPR- Do NOT Intubate      Clinically Significant Risk Factors                      # Dementia: noted on problem list    # Cachexia: Estimated body mass index is 17.98 kg/m  as calculated from the following:    Height as of this encounter: 1.93 m (6' 4\").    Weight as of this encounter: 67 kg (147 lb 11.3 oz).   # Moderate Malnutrition: based on nutrition assessment           Disposition Plan      Expected Discharge Date: 10/24/2023    Discharge Delays: Complex Discharge  Placement - LTC  *Medically Ready for Discharge  1:1 Sitter still ordered - MD to assess  "   Discharge Comments: 1:1 off 0830 10/20/2023 needs placement            Annia Rosas MD  Hospitalist Service  [facility]  Securely message with [employer] (more info)  Text page via [employer] Paging/Directory   ______________________________________________________________________    Interval History   Patient is doing well today.  He is responsive and not having complaints.  Was pleasant during my visit today.    Physical Exam   Vital Signs: Temp: 97.8  F (36.6  C) Temp src: Oral BP: 113/60 Pulse: 62   Resp: 14 SpO2: 95 % O2 Device: None (Room air)    Weight: 147 lbs 11.33 oz    General Appearance: Awake, alert, in no acute distress  Respiratory: CTAB, no wheeze  Cardiovascular: RRR  GI: soft, nontender, non distended, normal bowel sounds  Skin: no jaundice, no rash      Medical Decision Making       30 MINUTES SPENT BY ME on the date of service doing chart review, history, exam, documentation & further activities per the note.      Data         Imaging results reviewed over the past 24 hrs:   No results found for this or any previous visit (from the past 24 hour(s)).

## 2023-10-22 NOTE — PLAN OF CARE
Goal Outcome Evaluation:     Pt Is A&O to self, sometimes follows 1 step commands. Assist x1-2 with transfer form bed to chair, up in chair for 4 hours this am. Denied pain, numbness and tingling, pulses +, VSS. No SOB assessed. Pt is incontinent, brief changed x1 this shift so far. Appetite excellent, drinking WNL. No s/s of  infection or bleeding  Problem: Plan of Care - These are the overarching goals to be used throughout the patient stay.    Goal: Optimal Comfort and Wellbeing  Outcome: Progressing  Intervention: Provide Person-Centered Care  Recent Flowsheet Documentation  Taken 10/22/2023 0850 by Eleonora Moreno, RN  Trust Relationship/Rapport:   care explained   choices provided   questions encouraged   questions answered   thoughts/feelings acknowledged     Problem: Orthopaedic Fracture  Goal: Absence of Bleeding  Outcome: Progressing  Goal: Absence of Infection Signs and Symptoms  Outcome: Progressing  Goal: Effective Tissue Perfusion  Outcome: Progressing  Goal: Optimal Pain Control and Function  Outcome: Progressing     Problem: Risk for Delirium  Goal: Improved Attention and Thought Clarity  Outcome: Progressing     Problem: Oral Intake Inadequate  Goal: Improved Oral Intake  Outcome: Progressing     Problem: Plan of Care - These are the overarching goals to be used throughout the patient stay.    Goal: Absence of Hospital-Acquired Illness or Injury  Intervention: Identify and Manage Fall Risk  Recent Flowsheet Documentation  Taken 10/22/2023 0850 by Eleonora Moreno, RN  Safety Promotion/Fall Prevention:   assistive device/personal items within reach   activity supervised   increased rounding and observation   increase visualization of patient   patient and family education   nonskid shoes/slippers when out of bed   supervised activity   toileting scheduled  Intervention: Prevent Skin Injury  Recent Flowsheet Documentation  Taken 10/22/2023 1259 by Eleonora Moreno, RN  Body Position:   supine   supine,  legs elevated   supine, head elevated     Problem: Orthopaedic Fracture  Goal: Optimal Functional Ability  Intervention: Optimize Functional Ability  Recent Flowsheet Documentation  Taken 10/22/2023 0850 by Eleonora Moreno RN  Activity Management:   activity encouraged   up in chair  Goal: Effective Oxygenation and Ventilation  Intervention: Promote Airway Secretion Clearance  Recent Flowsheet Documentation  Taken 10/22/2023 0850 by Eleonora Moreno RN  Activity Management:   activity encouraged   up in chair     Problem: Violence Risk or Actual  Goal: Anger and Impulse Control  Intervention: Minimize Safety Risk  Recent Flowsheet Documentation  Taken 10/22/2023 0850 by Eleonora Moreno RN  Enhanced Safety Measures: room near unit station     Problem: Risk for Delirium  Goal: Improved Behavioral Control  Intervention: Minimize Safety Risk  Recent Flowsheet Documentation  Taken 10/22/2023 0850 by Eleonora Moreno RN  Enhanced Safety Measures: room near unit station  Trust Relationship/Rapport:   care explained   choices provided   questions encouraged   questions answered   thoughts/feelings acknowledged     Problem: Seclusion/Restraint, Behavioral  Goal: Absence of Harm or Injury  Intervention: Protect Dignity, Rights, and Personal Wellbeing  Recent Flowsheet Documentation  Taken 10/22/2023 0850 by Eleonora Moreno RN  Trust Relationship/Rapport:   care explained   choices provided   questions encouraged   questions answered   thoughts/feelings acknowledged  Intervention: Protect Skin and Joint Integrity  Recent Flowsheet Documentation  Taken 10/22/2023 1259 by Eleonora Moreno RN  Body Position:   supine   supine, legs elevated   supine, head elevated     Problem: Restraint, Nonviolent  Goal: Absence of Harm or Injury  Intervention: Protect Skin and Joint Integrity  Recent Flowsheet Documentation  Taken 10/22/2023 1259 by Eleonora Moreno RN  Body Position:   supine   supine, legs elevated   supine, head  elevated  Intervention: Protect Dignity, Rights and Personal Wellbeing  Recent Flowsheet Documentation  Taken 10/22/2023 0850 by Eleonora Mroeno RN  Trust Relationship/Rapport:   care explained   choices provided   questions encouraged   questions answered   thoughts/feelings acknowledged     Problem: Suicide Risk  Goal: Absence of Self-Harm  Intervention: Assess Risk to Self and Maintain Safety  Recent Flowsheet Documentation  Taken 10/22/2023 0850 by Eleonora Moreno RN  Enhanced Safety Measures: room near unit station     Problem: Confusion Chronic  Goal: Optimal Cognitive Function  Intervention: Minimize Injury Risk and Provide Safety  Recent Flowsheet Documentation  Taken 10/22/2023 0850 by Eleonora Moreno RN  Enhanced Safety Measures: room near unit station

## 2023-10-22 NOTE — PROGRESS NOTES
[facility]    Medicine Progress Note - Hospitalist Service    Date of Admission:  9/29/2023    Assessment & Plan   Rosemary Farmer is a 88-year-old with severe dementia with behavioral disturbance who lives in a senior memory care unit at [place] was brought in due to recurrent falls.  He sustained acute on chronic C7 and acute on chronic T3 fracture.  Neurosurgery was consulted and recommended conservative treatment.  Patient's behavior is not controlled with his home medications.  He required one-to-one sitter and nonpharmacological measures were unable to control his behavior.  Given recurrent falls and ongoing issues with behavior prognosis is guarded.  Patient is much calmer but gets agitated occasionally. Continue to reduced dose of Sinemet Sinemet to 5-50 over few weeks.  Awaiting placement for long term memory unit. Complicated with intermittent agitation, confusion.    10/22: has not had a 1:1 since 10/20     Acute/subacute C7 and T3 fracture   Chronic T1 fracture  - S/P mechanical fall  - CT of head did not show any acute abnormality.   Spine imaging showed : superior endplate compression fracture of the C7 vertebrae with loss of 15% of vertebral body height. Subtle high signal change within the anterior/superior margin of the C7 vertebrae suggests that this fracture is acute/subacute in nature.   - Also found to have chronic compression fracture of the T1 vertebrae, acute/subacute T3 vertebrae   Spine surgery consulted, recommend conservative management, cervical collar x 6-12 weeks time considering  is acute to subacute with follow up in 6 weeks time.    - D/Manish Ativan since it made his behavior worse.  -As needed Zyprexa     Essential Hypertension with am hypotenison  - Norvasc at 2.5 mg po daily due to am hypotension, with holding parameters  - hydralazin iv prn for occasional hypertension   - monitor vital signs per protocol     Acute encephalopathy-resolving  - Baseline  "dementia with behavioral disturbance  - On seroquel 12.5 mg bid, 50 mg at bedtime, 25 mg bid prn for agitation; ramelteon 8 mg at bedtime, trazodone 100 mg at bedtime   - Continue IM Zyprexa 2.5 mg twice daily as needed for agitation.    - High-dose Sinemet was DC'd on 9/30.  Patient did have agitation on  of Sinemet.  As per his geriatrician plan we will continue to taper off Sinemet.    - Decrease Sinemet to 5-50 on 10/2/23.  Plan to do this more than 2 weeks, discussed with pharmacist  - no evidence of hypoglycemia.  -intermittent agitation and confusion, responding well to IM Zyprexa     Neuropathy   - On lyrica 50 mg bid     History of a fib  - S/p pacemaker  - No anticoagulation because of fall     Weakness and deconditioning  - S/p recurrent falls  - Patient needs long-term care placement   -TCU refused due to agitation, encephalopathy and dementia.   -SW for long term memory care placement        Diet: Regular Diet Adult  Snacks/Supplements Adult: Ensure Enlive; Between Meals  Snacks/Supplements Adult: Magic Cup; Between Meals    DVT Prophylaxis: Pneumatic Compression Devices  Manzo Catheter: Not present  Lines: None     Cardiac Monitoring: None  Code Status: No CPR- Do NOT Intubate      Clinically Significant Risk Factors                      # Dementia: noted on problem list    # Cachexia: Estimated body mass index is 17.98 kg/m  as calculated from the following:    Height as of this encounter: 1.93 m (6' 4\").    Weight as of this encounter: 67 kg (147 lb 11.3 oz).   # Moderate Malnutrition: based on nutrition assessment           Disposition Plan     Expected Discharge Date: 10/24/2023    Discharge Delays: Complex Discharge  Placement - LTC  *Medically Ready for Discharge  1:1 Sitter still ordered - MD to assess    Discharge Comments: 1:1 off 0830 10/20/2023 needs placement            Annia Rosas MD  Hospitalist Service  [facility]  Securely message with [employer] (more " info)  Text page via [employer] Paging/Directory   ______________________________________________________________________    Interval History   Mr. Farmer is sleepy today. He is not requiring a 1:1. He has not been having behaviors for the past two days. Doing well.    Physical Exam   Vital Signs: Temp: 97.9  F (36.6  C) Temp src: Oral BP: (!) 142/60 Pulse: 79   Resp: 14 SpO2: 90 % O2 Device: None (Room air)    Weight: 147 lbs 11.33 oz    General Appearance: Sleepy, in no acute distress  Respiratory: CTAB, no wheeze  Cardiovascular: RRR  GI: soft, nontender, non distended, normal bowel sounds  Skin: no jaundice, no rash      Medical Decision Making       30 MINUTES SPENT BY ME on the date of service doing chart review, history, exam, documentation & further activities per the note.      Data         Imaging results reviewed over the past 24 hrs:   No results found for this or any previous visit (from the past 24 hour(s)).

## 2023-10-23 NOTE — PROGRESS NOTES
Care Management Follow Up    Length of Stay (days): 22    Expected Discharge Date: 10/24/2023     Concerns to be Addressed:     memory care placement   Patient plan of care discussed at interdisciplinary rounds: Yes    Anticipated Discharge Disposition:  SNF with memory care      Anticipated Discharge Services:  memory care   Anticipated Discharge DME:  per treatment team     Patient/family educated on Medicare website which has current facility and service quality ratings:  yes  Education Provided on the Discharge Plan:  yes  Patient/Family in Agreement with the Plan:  yes    Referrals Placed by CM/SW:  post acute care facilities   Private pay costs discussed: Not applicable    Additional Information:  Pt seeking SNF with memory care.  Patient has been getting A2 for ambulation   Pt was off of 1:1 from 10/16 PM to 10/20 AM and back on for a short time (few hours) and is now back off 1:1.  Patient has been off 1:1 all weekend and has been calm and cooperative.       [facility]  Accepts higher level of care.  Has opening.  Cannot admit with aggressive behaviors, needs to show at least 2 days of appropriate behaviors.   [phone]  Fax: [phone]    10/23- Discussed with Enri[facility] still needs nurse notes from [facility].  CM spoke with wife, Aparna.  Aparna had gone to [facility] to obtain notes to hand deliver to [facility] in [place].  While Aparna was there, the staff needing to assist her was in a meeting.    SANDRA called [facility] to speak with admissions about getting reports to send to [facility], left voice message.      Other facilities:  Aparna asked about [facility] and group home in [facility].  CM called and left message for admissions.     [facility], [facility] [place], and [facility].     CM called [facility] in [place], spoke with Ita in admissions.  They do have an opening and will review  "updated information.   informed Ita of patient's current status.   [facility]: [phone]          Social History:  \"Assessed, spoke to wife Aparna and Swapna MEIER at [facility] [phone]. Pt is unable to return to assist lvg at this time due to level of agitation and his required level of care. Per Swapna MEIER on call, At baseline, pt is able to reposition and transfer w/an assist of one. He can get to toilet on his own with walker and feed himself. Pt lives alone at [facility]. In order for pt to return to [place], he would need to be able to transfer and ambulate w/walker independently. Pt is easily agitated at baseline and is, per Swapna, the most challenging resident they have. He has habit of hitting and impulsive with activity and falls often. He destroyed his room last night. Swapna states that he is so restless that he may require some form of sedation and would probably need his medications adjusted. Pt is  of [employer] and loves talking about it, for distraction conversation, it's recommended to ask him about his passion. \"     [place]:   [phone].     Zonia Calhoun RN      "

## 2023-10-23 NOTE — PLAN OF CARE
Goal Outcome Evaluation:       Pt denied pain, numbness and tingling. Denied SOB. Up in chair for a few hour this evening. Excellent appetite, drinking well. No bm this shift, incontinent of urine x2. VSS, no s/s of infection or bleeding. Pt was cooperative this evening  Problem: Plan of Care - These are the overarching goals to be used throughout the patient stay.    Goal: Optimal Comfort and Wellbeing  10/22/2023 2141 by Eleonora Moreno RN  Outcome: Progressing  10/22/2023 1310 by Eleonora Moreno RN  Outcome: Progressing  Intervention: Provide Person-Centered Care  Recent Flowsheet Documentation  Taken 10/22/2023 1510 by Eleonora Moreno RN  Trust Relationship/Rapport:   care explained   choices provided   questions encouraged   questions answered   thoughts/feelings acknowledged  Taken 10/22/2023 0850 by Eleonora Moreno RN  Trust Relationship/Rapport:   care explained   choices provided   questions encouraged   questions answered   thoughts/feelings acknowledged     Problem: Orthopaedic Fracture  Goal: Absence of Bleeding  10/22/2023 2141 by Eleonora Moreno RN  Outcome: Progressing  10/22/2023 1310 by Eleonora Moreno RN  Outcome: Progressing  Goal: Effective Bowel Elimination  Outcome: Progressing  Goal: Absence of Infection Signs and Symptoms  Outcome: Progressing  Goal: Effective Tissue Perfusion  10/22/2023 2141 by Eleonora Moreno RN  Outcome: Progressing  10/22/2023 1310 by Eleonora Moreno RN  Outcome: Progressing  Goal: Optimal Pain Control and Function  10/22/2023 2141 by Eleonora Moreno RN  Outcome: Progressing  10/22/2023 1310 by Eleonora Moreno RN  Outcome: Progressing     Problem: Risk for Delirium  Goal: Improved Attention and Thought Clarity  Outcome: Progressing     Problem: Oral Intake Inadequate  Goal: Improved Oral Intake  10/22/2023 2141 by Eleonora Moreno RN  Outcome: Progressing  10/22/2023 1310 by Eleonora Moreno RN  Outcome: Progressing     Problem: Plan of Care - These are the  overarching goals to be used throughout the patient stay.    Goal: Absence of Hospital-Acquired Illness or Injury  Intervention: Identify and Manage Fall Risk  Recent Flowsheet Documentation  Taken 10/22/2023 1510 by Eleonora Moreno RN  Safety Promotion/Fall Prevention:   assistive device/personal items within reach   activity supervised   increased rounding and observation   increase visualization of patient   patient and family education   nonskid shoes/slippers when out of bed   supervised activity   toileting scheduled  Taken 10/22/2023 0850 by Eleonora Moreno RN  Safety Promotion/Fall Prevention:   assistive device/personal items within reach   activity supervised   increased rounding and observation   increase visualization of patient   patient and family education   nonskid shoes/slippers when out of bed   supervised activity   toileting scheduled  Intervention: Prevent Skin Injury  Recent Flowsheet Documentation  Taken 10/22/2023 2052 by Eleonora Moreno RN  Body Position:   supine, legs elevated   supine, head elevated  Taken 10/22/2023 1510 by Eleonora Moreno RN  Body Position:   supine, legs elevated   supine, head elevated  Taken 10/22/2023 1259 by Eleonora Moreno RN  Body Position:   supine   supine, legs elevated   supine, head elevated     Problem: Orthopaedic Fracture  Goal: Optimal Functional Ability  Intervention: Optimize Functional Ability  Recent Flowsheet Documentation  Taken 10/22/2023 0850 by Eleonora Moreno RN  Activity Management:   activity encouraged   up in chair  Goal: Effective Oxygenation and Ventilation  Intervention: Promote Airway Secretion Clearance  Recent Flowsheet Documentation  Taken 10/22/2023 0850 by Eleonora Moreno RN  Activity Management:   activity encouraged   up in chair     Problem: Violence Risk or Actual  Goal: Anger and Impulse Control  Intervention: Minimize Safety Risk  Recent Flowsheet Documentation  Taken 10/22/2023 1510 by Eleonora Moreno RN  Enhanced  Safety Measures: room near unit station  Taken 10/22/2023 0850 by Eleonora Moreno RN  Enhanced Safety Measures: room near unit station     Problem: Risk for Delirium  Goal: Improved Behavioral Control  Intervention: Minimize Safety Risk  Recent Flowsheet Documentation  Taken 10/22/2023 1510 by Eleonora Moreno RN  Enhanced Safety Measures: room near unit station  Trust Relationship/Rapport:   care explained   choices provided   questions encouraged   questions answered   thoughts/feelings acknowledged  Taken 10/22/2023 0850 by Eleonora Moreno RN  Enhanced Safety Measures: room near unit station  Trust Relationship/Rapport:   care explained   choices provided   questions encouraged   questions answered   thoughts/feelings acknowledged     Problem: Seclusion/Restraint, Behavioral  Goal: Absence of Harm or Injury  Intervention: Protect Dignity, Rights, and Personal Wellbeing  Recent Flowsheet Documentation  Taken 10/22/2023 1510 by Eleonora Moreno RN  Trust Relationship/Rapport:   care explained   choices provided   questions encouraged   questions answered   thoughts/feelings acknowledged  Taken 10/22/2023 0850 by Eleonora Moreno RN  Trust Relationship/Rapport:   care explained   choices provided   questions encouraged   questions answered   thoughts/feelings acknowledged  Intervention: Protect Skin and Joint Integrity  Recent Flowsheet Documentation  Taken 10/22/2023 2052 by Eleonora Moreno RN  Body Position:   supine, legs elevated   supine, head elevated  Taken 10/22/2023 1510 by Eleonora Moreno RN  Body Position:   supine, legs elevated   supine, head elevated  Taken 10/22/2023 1259 by Eleonora Moreno RN  Body Position:   supine   supine, legs elevated   supine, head elevated     Problem: Restraint, Nonviolent  Goal: Absence of Harm or Injury  Intervention: Protect Skin and Joint Integrity  Recent Flowsheet Documentation  Taken 10/22/2023 2052 by Eleonora Moreno RN  Body Position:   supine, legs elevated    supine, head elevated  Taken 10/22/2023 1510 by Eleonora Moreno RN  Body Position:   supine, legs elevated   supine, head elevated  Taken 10/22/2023 1259 by Eleonora Moreno RN  Body Position:   supine   supine, legs elevated   supine, head elevated  Intervention: Protect Dignity, Rights and Personal Wellbeing  Recent Flowsheet Documentation  Taken 10/22/2023 1510 by Eleonora Moreno RN  Trust Relationship/Rapport:   care explained   choices provided   questions encouraged   questions answered   thoughts/feelings acknowledged  Taken 10/22/2023 0850 by Eleonora Moreno RN  Trust Relationship/Rapport:   care explained   choices provided   questions encouraged   questions answered   thoughts/feelings acknowledged     Problem: Suicide Risk  Goal: Absence of Self-Harm  Intervention: Assess Risk to Self and Maintain Safety  Recent Flowsheet Documentation  Taken 10/22/2023 1510 by Eleonora Moreno RN  Enhanced Safety Measures: room near unit station  Taken 10/22/2023 0850 by Eleonora Moreno RN  Enhanced Safety Measures: room near unit station     Problem: Confusion Chronic  Goal: Optimal Cognitive Function  Intervention: Minimize Injury Risk and Provide Safety  Recent Flowsheet Documentation  Taken 10/22/2023 1510 by Eleonora Moreno RN  Enhanced Safety Measures: room near unit station  Taken 10/22/2023 0850 by Eleonora Moreno RN  Enhanced Safety Measures: room near unit station

## 2023-10-23 NOTE — PROGRESS NOTES
[facility]    Medicine Progress Note - Hospitalist Service    Date of Admission:  9/29/2023    Assessment & Plan   Rosemary Farmer is a 88-year-old with severe dementia with behavioral disturbance who lives in a senior memory care unit at [place] was brought in due to recurrent falls.  He sustained acute on chronic C7 and acute on chronic T3 fracture.  Neurosurgery was consulted and recommended conservative treatment.  Patient's behavior is not controlled with his home medications.  He required one-to-one sitter and nonpharmacological measures were unable to control his behavior.  Given recurrent falls and ongoing issues with behavior prognosis is guarded.  Patient is much calmer but gets agitated occasionally. Continue to reduced dose of Sinemet Sinemet to 5-50 over few weeks.  Awaiting placement for long term memory unit. Complicated with intermittent agitation, confusion.    10/22: has not had a 1:1 since 10/20     Acute/subacute C7 and T3 fracture   Chronic T1 fracture  - S/P mechanical fall  - CT of head did not show any acute abnormality.   Spine imaging showed : superior endplate compression fracture of the C7 vertebrae with loss of 15% of vertebral body height. Subtle high signal change within the anterior/superior margin of the C7 vertebrae suggests that this fracture is acute/subacute in nature.   - Also found to have chronic compression fracture of the T1 vertebrae, acute/subacute T3 vertebrae   Spine surgery consulted, recommend conservative management, cervical collar x 6-12 weeks time considering  is acute to subacute with follow up in 6 weeks time.    - D/Manish Ativan since it made his behavior worse.  -As needed Zyprexa     Essential Hypertension with am hypotenison  - Norvasc at 2.5 mg po daily due to am hypotension, with holding parameters  - hydralazin iv prn for occasional hypertension   - monitor vital signs per protocol     Acute encephalopathy-resolving  - Baseline  "dementia with behavioral disturbance  - On seroquel 12.5 mg bid, 50 mg at bedtime, 25 mg bid prn for agitation; ramelteon 8 mg at bedtime, trazodone 100 mg at bedtime   - Continue IM Zyprexa 2.5 mg twice daily as needed for agitation.    - High-dose Sinemet was DC'd on 9/30.  Patient did have agitation on  of Sinemet.  As per his geriatrician plan we will continue to taper off Sinemet.    - Decrease Sinemet to 5-50 on 10/2/23.  Plan to do this more than 2 weeks, discussed with pharmacist  - no evidence of hypoglycemia.  -intermittent agitation and confusion, responding well to IM Zyprexa     Neuropathy   - On lyrica 50 mg bid     History of a fib  - S/p pacemaker  - No anticoagulation because of fall     Weakness and deconditioning  - S/p recurrent falls  - Patient needs long-term care placement   -TCU refused due to agitation, encephalopathy and dementia.   -SW for long term memory care placement          Diet: Regular Diet Adult    DVT Prophylaxis: Pneumatic Compression Devices  Manzo Catheter: Not present  Lines: None     Cardiac Monitoring: None  Code Status: No CPR- Do NOT Intubate      Clinically Significant Risk Factors                      # Dementia: noted on problem list    # Cachexia: Estimated body mass index is 17.98 kg/m  as calculated from the following:    Height as of this encounter: 1.93 m (6' 4\").    Weight as of this encounter: 67 kg (147 lb 11.3 oz).   # Moderate Malnutrition: based on nutrition assessment           Disposition Plan      Expected Discharge Date: 10/24/2023    Discharge Delays: Complex Discharge  Placement - LTC  *Medically Ready for Discharge    Discharge Comments: 1:1 off 0830 10/20/2023 needs placement            Annia Rosas MD  Hospitalist Service  [facility]  Securely message with [employer] (more info)  Text page via [employer] Paging/Directory   ______________________________________________________________________    Interval History   Doing " well, no complaints, no changes    Physical Exam   Vital Signs: Temp: 97.5  F (36.4  C) Temp src: Oral BP: 94/49 Pulse: 71   Resp: 19 SpO2: 97 % O2 Device: None (Room air)    Weight: 147 lbs 11.33 oz    General Appearance: Awake, alert, in no acute distress  Respiratory: CTAB, no wheeze  Cardiovascular: RRR  GI: soft, nontender, non distended, normal bowel sounds  Skin: no jaundice, no rash      Medical Decision Making       25 MINUTES SPENT BY ME on the date of service doing chart review, history, exam, documentation & further activities per the note.      Data     I have personally reviewed the following data over the past 24 hrs:    N/A  \   N/A   / 314     N/A N/A N/A /  N/A   N/A N/A N/A \       Imaging results reviewed over the past 24 hrs:   No results found for this or any previous visit (from the past 24 hour(s)).

## 2023-10-23 NOTE — PROGRESS NOTES
SPIRITUAL HEALTH SERVICES ([facility])  SPIRITUAL ASSESSMENT Progress Note  [facility]. Unit P4    REFERRAL SOURCE: LOS     Mrs Farmer told me that she and her  continue to be well supported by the staff of [facility].  She will contact the [facility] department should that change.      PLAN: [facility] remain available for emotional and spiritual support as needed/requested by patient / family.      Mirlande Douglas, Ph.D., [facility]      [facility]S available 24/7 for emergency requests/referrals, either by having the on-call  paged or by entering an ASAP/STAT consult in Epic (this will also page the on-call ).

## 2023-10-23 NOTE — PROGRESS NOTES
CLINICAL NUTRITION SERVICES - SHORT NOTE     Nutrition Prescription    RECOMMENDATIONS FOR MDs/PROVIDERS TO ORDER:    Malnutrition Status:    Moderate malnutrition In Context of: Acute on Chronic illness or disease    Recommendations already ordered by Registered Dietitian (RD):  Discontinue supplements with improved and adequate intake    Future/Additional Recommendations:  Add supplement back if intake becomes inadequate     EVALUATION OF THE PROGRESS TOWARD GOALS   Diet: Regular  Nutrition Supplement: Ensure enlive daily, Magic Cup daily     Intake: Good    Pt eating > 75% of 3 meal/day  Yesterday ate 3462 kcal, 139 g protein including supplements    Good fluid intake, 1470 ml/day    Meeting >100% of estimated nutrition needs     NEW FINDINGS   -Pt awaiting placement.     Weight History: Clinically significant wt loss of 20.6% weight loss x 10 months, no recent losses   Last weight taken 10/13  GI: Last BM 10/20  Edema: None per nsg  Labs: No recent  Meds: mvi with minerals, miralax bid, pericolace bid    Dosing Weight: 65.9 kg     ASSESSED NUTRITION NEEDS  Estimated Energy Needs: 6577-4223 kcals/day (30 - 35 kcals/kg )  Justification: Underweight  Estimated Protein Needs: 79-99 grams protein/day (1.2 - 1.5 grams of pro/kg)  Justification: Increased needs  Estimated Fluid Needs: 8845-4122 mL/day (25 - 30 mL/kg)   Justification: Maintenance      MALNUTRITION:   % Weight Loss:  > 20% in 1 year (severe malnutrition)  % Intake:  No decreased intake noted  Subcutaneous Fat Loss:  Orbital region moderate-severe depletion  Muscle Loss:  Temporal region moderate depletion, Clavicle bone region severe depletion, and Acromion bone region moderate depletion  Fluid Retention:  Trace    Malnutrition Diagnosis: Moderate malnutrition  In Context of:  Acute on Chronic illness or disease- Improving     INTERVENTIONS  Implementation  Discontinue supplements with improved and adequate intake    Monitoring/Evaluation  Progress  toward goals will be monitored and evaluated per protocol.

## 2023-10-23 NOTE — PLAN OF CARE
"  Problem: Plan of Care - These are the overarching goals to be used throughout the patient stay.    Description: The Care Plan Review/Shift Note, Individualized Goals, Hospital-Acquired Illness or Injury, Comfort and Wellbeing, and Transition Planning are the \"Overarching Goals\" and should be updated throughout the hospitalization.  Please hover over the (i) for specific information on each goal topic.  Goal: Plan of Care Review  Description: The Plan of Care Review/Shift note should be completed every shift.  The Outcome Evaluation is a brief statement about your assessment that the patient is improving, declining, or no change.  This information will be displayed automatically on your shift note.  Outcome: Progressing  Flowsheets (Taken 10/23/2023 1235)  Outcome Evaluation: Pt is consistantly meeting nutrition needs with meals. Stopping supplements. Last weigth 10/13  Overall Patient Progress: improving   Goal Outcome Evaluation:           Overall Patient Progress: improvingOverall Patient Progress: improving    Outcome Evaluation: Pt is consistantly meeting nutrition needs with meals. Stopping supplements. Last weigth 10/13      "

## 2023-10-23 NOTE — PLAN OF CARE
Problem: Plan of Care - These are the overarching goals to be used throughout the patient stay.    Goal: Optimal Comfort and Wellbeing  Outcome: Progressing   Goal Outcome Evaluation:  Pt denies pain. Sched APAP admin. Lido patch removed. Cervical collar on at all times.       Problem: Behavioral Health Comorbidity  Goal: Maintenance of Behavioral Health Symptom Control  Outcome: Progressing        Pt cooperative except during changing brief & using lift to get OOB. Ax3 with baron to chair. Pt calmed down after transfer. Chair/bed alarm on at all times. Change of shift pt was pleasantly eating cookies with spouse at bedside.

## 2023-10-23 NOTE — PLAN OF CARE
Problem: Plan of Care - These are the overarching goals to be used throughout the patient stay.    Goal: Plan of Care Review  Description: The Plan of Care Review/Shift note should be completed every shift.  The Outcome Evaluation is a brief statement about your assessment that the patient is improving, declining, or no change.  This information will be displayed automatically on your shift note.  Outcome: Progressing   Goal Outcome Evaluation:    Patient is alert and awake majority of the night. He is cooperative and pleasant. Cervical collar in place. Vital signs stable. Took 12am Tylenol with strawberry jelly. Did take Melatonin at 0130 with no effect. Regular diet.   Evelia Huffman RN

## 2023-10-24 NOTE — PROVIDER NOTIFICATION
Message sent to Dr. Rosas and talked to her on the phone. The patient is very agitated, not re directable. Is fixating on certain objects, keeps trying to get out of the chair, is not strong nor stable enough to stand on his own. Gait belt on for safety. Request medication from Dr. Rosas, see new orders    Update: patient has since taken STAT dose of Seroquel without incident. Also tried to give Tylenol crushed, patient put it in his mouth and spit it back out.    15:23pm: Patient wife has now arrived to the room, is able to settle the patient down and he is cooperative at this time. Will continue to monitor.

## 2023-10-24 NOTE — PLAN OF CARE
Goal Outcome Evaluation:      Plan of Care Reviewed With: patient          Problem: Behavioral Health Comorbidity  Goal: Maintenance of Behavioral Health Symptom Control  Outcome: Progressing      Pt was calm and cooperative most of the morning, became more agitated this afternoon. Had to call the doctor for additional PRN meds. Pt repeatedly tried to get out of chair, not redirectable, confused and agitated until his wife got here.       Problem: Orthopaedic Fracture  Goal: Effective Bowel Elimination  Outcome: Progressing   Pt had a large bowel movement this morning on the commode, assist of 2 for transfers.       Problem: Orthopaedic Fracture  Goal: Fracture Stability  Outcome: Progressing   Cervical collar remains in place.    Problem: Risk for Delirium  Goal: Improved Attention and Thought Clarity  Outcome: Progressing   Pt alert in the morning, oriented to person and . This afternoon, was more agitated and more confused.

## 2023-10-24 NOTE — PROGRESS NOTES
[facility]    Medicine Progress Note - Hospitalist Service    Date of Admission:  9/29/2023    Assessment & Plan   Rosemary Farmer is a 88-year-old with severe dementia with behavioral disturbance who lives in a senior memory care unit at [facility] was brought in due to recurrent falls.  He sustained acute on chronic C7 and acute on chronic T3 fracture. Neurosurgery was consulted and recommended conservative treatment.      10/24: has not had a 1:1 since 10/20, last dose of IM medication was olanzapine 10/19     Acute/subacute C7 and T3 fracture   Chronic T1 fracture  Mechanical fall, initial encounter  - CT of head did not show any acute abnormality.   -MRI cervical spine 9/29: superior endplate compression fracture of the C7 vertebrae with loss of 15% of vertebral body height. Subtle high signal change within the anterior/superior margin of the C7 vertebrae suggests that this fracture is acute/subacute in nature. chronic compression fracture of the T1 vertebrae, acute/subacute T3 vertebrae   -Spine surgery consulted, recommend conservative management, cervical collar x 6-12 weeks time considering  is acute to subacute with follow up in 6 weeks time.       Essential Hypertension with am hypotenison  - Norvasc at 2.5 mg po daily due to am hypotension, with holding parameters     Acute encephalopathy  - Baseline dementia with behavioral disturbance, has confusion and anxiety at baseline  - worsened Hyperactive delirium after admission which has now resolved  - On seroquel 12.5 mg bid, 50 mg at bedtime, 25 mg bid prn for agitation; ramelteon 8 mg at bedtime, trazodone 100 mg at bedtime   - High-dose Sinemet was DC'd on 9/30.  Patient had agitation on  of Sinemet.  As per his geriatrician plan we will continue to taper off Sinemet.  Currently on 5-50 BID, will switch to 5-50 daily on 11/2 for 14 days then stop  - IM olanzapine 2.5 mg twice daily as needed for agitation available. Last dose  "given 10/19     Neuropathy   - On lyrica 50 mg bid     History of a fib  - S/p pacemaker  - No anticoagulation because of fall     Weakness and deconditioning  - S/p recurrent falls  - Patient needs long-term care placement   -TCU refused due to agitation, encephalopathy and dementia.   -SW for long term memory care placement        Diet: Regular Diet Adult    DVT Prophylaxis: Enoxaparin (Lovenox) SQ  Manzo Catheter: Not present  Lines: None     Cardiac Monitoring: None  Code Status: No CPR- Do NOT Intubate      Clinically Significant Risk Factors                      # Dementia: noted on problem list    # Cachexia: Estimated body mass index is 17.98 kg/m  as calculated from the following:    Height as of this encounter: 1.93 m (6' 4\").    Weight as of this encounter: 67 kg (147 lb 11.3 oz).   # Moderate Malnutrition: based on nutrition assessment           Disposition Plan      Expected Discharge Date: 10/25/2023    Discharge Delays: Complex Discharge  Placement - LTC  *Medically Ready for Discharge    Discharge Comments: 1:1 off 0830 10/20/2023 needs placement            Annia Rosas MD  Hospitalist Service  [facility]  Securely message with [employer] (more info)  Text page via [employer] Paging/Directory   ______________________________________________________________________    Interval History   Mr. Farmer was doing well this morning.  He is not having any complaints.  He is pleasantly confused in the morning.  He did have some agitation later in the day and was given Seroquel.  We will continue to monitor.    Physical Exam   Vital Signs: Temp: 97.4  F (36.3  C) Temp src: Oral BP: (!) 141/68 Pulse: 64   Resp: 17 SpO2: 100 % O2 Device: None (Room air)    Weight: 147 lbs 11.33 oz    General Appearance: Awake, alert, in no acute distress  Respiratory: CTAB, no wheeze  Cardiovascular: RRR  GI: soft, nontender, non distended, normal bowel sounds  Skin: no jaundice, no rash      Medical " Decision Making       30 MINUTES SPENT BY ME on the date of service doing chart review, history, exam, documentation & further activities per the note.      Data         Imaging results reviewed over the past 24 hrs:   No results found for this or any previous visit (from the past 24 hour(s)).

## 2023-10-24 NOTE — PROGRESS NOTES
Care Management Follow Up    Length of Stay (days): 23    Expected Discharge Date: 10/26/2023     Concerns to be Addressed:  complex discharge, behaviors, LTC placement       Patient plan of care discussed at interdisciplinary rounds: Yes    Anticipated Discharge Disposition:  memory care     Anticipated Discharge Services:  memory care    Anticipated Discharge DME:  baron lift    Education Provided on the Discharge Plan:  per care team    Patient/Family in Agreement with the Plan:      Referrals Placed by CM/SW:  memory care    Private pay costs discussed: per facility    Additional Information:  Patient medically ready for discharge. Needing memory care placement. Off 1:1. Last IM Zyprexa was 10/19.      Social History:  Patient is unable to return to [facility] at this time due to level of agitation and his required level of care.  In order for pt to return to [facility], he would need to be able to transfer and ambulate w/walker independently.      10/24/23:  Spoke with Enriqueta from [facility]. She states they will continue to review. Patient will need to be assist of 2 only or baron, no IV/IM medications for behaviors and have 5 out of 7 days with no real behaviors or agitation. Also spouse needs to tour facility and hasn't scheduled time.  Voice message left with spouse requesting she take a tour.   Will clarify if patient should have psychiatry consult.        Crystal Ray RN

## 2023-10-24 NOTE — PLAN OF CARE
"  Problem: Plan of Care - These are the overarching goals to be used throughout the patient stay.    Goal: Plan of Care Review  Description: The Plan of Care Review/Shift note should be completed every shift.  The Outcome Evaluation is a brief statement about your assessment that the patient is improving, declining, or no change.  This information will be displayed automatically on your shift note.  Outcome: Progressing  Goal: Patient-Specific Goal (Individualized)  Description: You can add care plan individualizations to a care plan. Examples of Individualization might be:  \"Parent requests to be called daily at 9am for status\", \"I have a hard time hearing out of my right ear\", or \"Do not touch me to wake me up as it startles me\".  Outcome: Progressing  Goal: Absence of Hospital-Acquired Illness or Injury  Outcome: Progressing  Intervention: Identify and Manage Fall Risk  Recent Flowsheet Documentation  Taken 10/23/2023 1539 by Kait Worrell RN  Safety Promotion/Fall Prevention: activity supervised  Goal: Optimal Comfort and Wellbeing  Outcome: Progressing  Intervention: Provide Person-Centered Care  Recent Flowsheet Documentation  Taken 10/23/2023 1539 by Kait Worrell RN  Trust Relationship/Rapport: care explained  Goal: Readiness for Transition of Care  Outcome: Progressing     Problem: Behavioral Health Comorbidity  Goal: Maintenance of Behavioral Health Symptom Control  Outcome: Progressing  Intervention: Maintain Behavioral Health Symptom Control  Recent Flowsheet Documentation  Taken 10/23/2023 1539 by Kait Worrell RN  Medication Review/Management: medications reviewed     Problem: Orthopaedic Fracture  Goal: Absence of Bleeding  Outcome: Progressing  Goal: Effective Bowel Elimination  Outcome: Progressing  Goal: Absence of Embolism Signs and Symptoms  Outcome: Progressing  Goal: Fracture Stability  Outcome: Progressing  Goal: Optimal Functional Ability  Outcome: Progressing  Goal: Absence " of Infection Signs and Symptoms  Outcome: Progressing  Goal: Effective Tissue Perfusion  Outcome: Progressing  Goal: Optimal Pain Control and Function  Outcome: Progressing  Goal: Effective Oxygenation and Ventilation  Outcome: Progressing     Problem: Risk for Delirium  Goal: Optimal Coping  Outcome: Progressing  Intervention: Optimize Psychosocial Adjustment to Delirium  Recent Flowsheet Documentation  Taken 10/23/2023 1539 by Kait Worrell RN  Family/Support System Care: involvement promoted  Goal: Improved Behavioral Control  Outcome: Progressing  Intervention: Minimize Safety Risk  Recent Flowsheet Documentation  Taken 10/23/2023 1539 by Kait Worrell RN  Communication Enhancement Strategies:   call light answered in person   extra time allowed for response  Enhanced Safety Measures: room near unit station  Trust Relationship/Rapport: care explained  Goal: Improved Attention and Thought Clarity  Outcome: Progressing  Intervention: Maximize Cognitive Function  Recent Flowsheet Documentation  Taken 10/23/2023 1539 by Kait Worrell RN  Sensory Stimulation Regulation:   care clustered   lighting decreased  Reorientation Measures:   calendar in view   clock in view  Goal: Improved Sleep  Outcome: Progressing     Problem: Confusion Chronic  Goal: Optimal Cognitive Function  Outcome: Progressing  Intervention: Minimize Injury Risk and Provide Safety  Recent Flowsheet Documentation  Taken 10/23/2023 1539 by Kait Worrell RN  Enhanced Safety Measures: room near unit station  Intervention: Minimize and Manage Confusion  Recent Flowsheet Documentation  Taken 10/23/2023 1539 by Kait Worrell RN  Sensory Stimulation Regulation:   care clustered   lighting decreased  Reorientation Measures:   calendar in view   clock in view  Family/Support System Care: involvement promoted   Goal Outcome Evaluation:       Pt is alert and confused, agitated, spiting on the staff. Refused most of his medication  and spit out. Pt removed his cervical collar and trow away at staff. Encouraged to put it on but swearing and cursing staffs.

## 2023-10-24 NOTE — PLAN OF CARE
"  Problem: Plan of Care - These are the overarching goals to be used throughout the patient stay.    Goal: Optimal Comfort and Wellbeing  Outcome: Progressing     Problem: Behavioral Health Comorbidity  Goal: Maintenance of Behavioral Health Symptom Control  Outcome: Progressing     Problem: Orthopaedic Fracture  Goal: Absence of Bleeding  Outcome: Progressing   Goal Outcome Evaluation:       A&O to self only,  can be very confused and anxious at time.  Denies pain, no IV access. BP (!) 141/68 (BP Location: Left arm)   Pulse 64   Temp 97.4  F (36.3  C) (Oral)   Resp 17   Ht 1.93 m (6' 4\")   Wt 67 kg (147 lb 11.3 oz)   SpO2 100%   BMI 17.98 kg/m    on  RA, has a implanted pacemaker. ncontinence of and bladder, when wet that's when pt gets agitated and when cleaned and dry, pt was able to fall back asleep. Pt is a heavy assist of two as he gets very stiff and agitated during cares. Pt compliant with neck collar.                     "

## 2023-10-25 NOTE — CONSULTS
"      Psychiatry Consultation; Follow up              Reason for Consult, requesting source:    Dementia with behavioral disturbance, previous agitation, many meds    Requesting source: Annia Rosas    Labs and imaging reviewed. Nursing and provider consulted. Notes reviewed.     Total time spent in chart review, patient interview and coordination of care: 60 minutes              Interim history:    Psychiatry meeting with patient today regarding dementia with behavioral disturbance with some agitation.    Per provider note: Rosemary Farmer is a 88-year-old with severe dementia with behavioral disturbance who lives in a senior memory care unit at [facility] [facility] was brought in due to recurrent falls.  He sustained acute on chronic C7 and acute on chronic T3 fracture. Neurosurgery was consulted and recommended conservative treatment.     Today, patient was calm and pleasant when I met with him. He reports feeling \"good\", but was quite somnolent, so our assessment was limited. Per nursing, patient has been calm and cooperative.         Current Medications:      amLODIPine  2.5 mg Oral Daily    carbidopa-levodopa  1 half-tab Oral BID    Followed by    [START ON 11/2/2023] carbidopa-levodopa  1 half-tab Oral Daily    dorzolamide-timolol  1 drop Both Eyes BID    latanoprost  1 drop Both Eyes At Bedtime    lidocaine  1 patch Transdermal Q24h    multivitamin w/minerals  1 tablet Oral Daily    pregabalin  50 mg Oral BID    QUEtiapine  12.5 mg Oral BID    QUEtiapine  50 mg Oral At Bedtime    ramelteon  4 mg Oral At Bedtime    senna-docusate  1 tablet Oral BID    Or    senna-docusate  2 tablet Oral BID    traZODone  100 mg Oral At Bedtime              MSE:   Patient calm and pleasant, but somnolent. In no acute distress.     Vital signs:  Temp: 97.5  F (36.4  C) Temp src: Oral BP: (!) 157/74 Pulse: 61   Resp: 18 SpO2: 97 % O2 Device: None (Room air)   Height: 193 cm (6' 4\") Weight: 67 kg (147 lb 11.3 oz)  Estimated body mass " "index is 17.98 kg/m  as calculated from the following:    Height as of this encounter: 1.93 m (6' 4\").    Weight as of this encounter: 67 kg (147 lb 11.3 oz).    Qtc: No current EKG         DSM-5 Diagnosis:   Alzheimer's dementia, by history  Risk for delirium          Assessment:   Patient was being assisted by staff to use the restroom when I first arrived. When I returned, he was resting in a chair. Patient did not open his eyes for me and provided few, if any, verbal responses. However, he was not in acute distress and he was very calm.           Summary of Recommendations:   Would consider baseline EKG, as Seroquel can prolong QTc.    Patient currently taking Trazodone 100 mg at HS, ramelteon 4 mg at HS and Seroquel 50 mg at HS- I would consider choosing one medication for sleep, as this might cause over-sedation    Ordered Zyprexa ODT 2.5 mg BID PRN and linked this to Zyprexa 2.5 mg IM - PO medication is preferable to IM unless patient is beyond redirecting from unsafe behaviors    Delirium precautions:  Up during the day with lights on  Lights off at night, avoid interruptions during the night as much as possible  Family visits  Encourage wearing glasses  Reorientation  Avoid opioids, benzodiazepines, anticholinergics.    Continue to ensure proper nutrition, fluid and electrolyte balance. Monitor for infections, hypoxia, metabolic derangements, or other causes of delirium.       It is increasingly recognised that pharmacological treatments for dementia should be used as a second-line approach and that non-pharmacological options should, in best practice, be pursued first (Juwan S., Arvin, I., & MAR Sanchez (2004). Non-pharmacological interventions in dementia. Advances in Psychiatric Treatment, 10(3), 171-177. Doi:10.1192/apt.10.3.171)    Non-pharmacological approaches are preferable to reduce responsive behaviours, improve/maintain functional capacity and reduce emotional disorders (Fifi Odonnell 2018). " "    Please see below.     DEMENTIA INTERVENTIONS        Non-pharmacological interventions include but are not limited to:   Lavender   Warm Blankets and/or weighted blankets   Activities of interest currently or in the past   Calming music   5,4,3,2,1    Grounding exercise: 5 things you see, 4 things you feel, 3 things you hear, 2 things you smell, 1 thing you taste    Dementia Basics  Use a consistent positive physical approach  - gesture & greet by name   - offer your hand & make eye contact   - approach slowly within visual range   - shake hands & maintain hand-under-hand   - move to the side of the patient  - get to eye level & respect intimate space   - wait for acknowledgement     How you help     Sight or Visual cues    Verbal or Auditory cues    Touch or Tactile cues     USE VISUAL combined VERBAL (gesture/point)   -  It s about time for     -  Let s go this way     -  Here are your socks      DON T ask questions you DON T want to hear the answer to  ( Do you want to  ,  Are you ready to  , \"I need you to  )    Acknowledge the response/reaction to your info     USE THEIR WORDS (with a ? OR in agreement)    LIMIT your words - Keep it SIMPLE    WAIT!!!!      ID common interest    Say something nice about the person or their place    Share something about yourself and encourage the person to share back    Follow their lead - listen actively    Use some of their words to keep the flow going    Remember its the FIRST TIME!    Do s    Go with the FLOW    Use SUPPORTIVE communication techniques   - Use objects and the environment   - Give examples   - Use gestures and pointing   - Acknowledge & accept emotions   - Use empathy & Validation   - Use familiar phrases or known interests   - Respect  values  and  beliefs  - avoid the negative     DON Ts    Try to CONTROL the FLOW   - Give up reality orientation and BIG lies   - Do not correct errors   - Offer info if asked, monitoring the emotional state    Try to STOP " the FLOW   - Don t reject topics   - Don t try to distract UNTIL you are well connected   - Keep VISUAL cues positive       A Positive Physical Approach for Someone with Dementia   1. Knock on door or table - to get attention if the person is not looking at you & get permission to enter or approach     2. Open palm near face and smile - look friendly and give the person a visual cue make eye contact     3. Call the person by name OR at least say  Hi!      4. Move your hand out from an open hand near face to a greeting handshake position     5. Approach the person from the front - notice their reaction to your outstretched hand - start approaching or let the person come to you, if s/he likes to be in control     6. Move slowly - one step/second, stand tall, don t crouch down or lean in as you move toward the person     7. Move toward the right side of the person and offer your hand - give the person time to look at your hand and reach for it, if s/he is doing something else - offer, don t force     8. Stand to the side of the person at arm s length - respect personal space & be supportive not confrontational     9. Shake hands with the person - make eye contact while shaking     10. Slide your hand from a  shake  position to hand-under-hand position - for safety,     connection, and function     11. Give your name & greet -  I m (name). It s good to see you!      12. Get to the person s level to talk - sit, squat, or kneel if the person is seated and stand beside the person if s/he is standing     13. NOW, deliver your message        Approaching When The Person is distressed:    TWO CHANGES -   1. Look concerned not too happy, if the person is upset     2. Let the person move toward you, keeping your body turned  sideways(supportive - not confrontational)     3. After greeting  try one of two options    a.  Sounds like you are (give an emotion or feeling that seems to be true)???    b. Repeat the person s words to  "you  If s/he said,  Where s my mom?  you   would say  You re looking for your mom (pause)  tell me about your mom     If the person said  I want to go home! , you would say  You want to go home (pause)  Tell me about your home  .     BASIC CARD CUES - WITH Dementia   ? -Knock - Announce self   ? -Greet & Smile   ? -Move Slowly - Hand offered in  handshake  position   ? -Move from the front to the side   ? -Greet with a handshake & your name        -Slide into hand-under-hand hold         -Get to the person s level   ? -Be friendly -make a  nice  comment or smile         -Give your message  simple, short, friendly     First -     ALWAYS use the positive physical approach!     Then -     Pay attention to the THREE ways you communicate     1 .  How you speak   - Tone of voice (friendly not bossy or critical)   - Pitch of voice (deep is better)   - Speed of speech ( slow and easy not pressured or fast)     2 .  What you say   THREE basic reasons to talk to someone   1. To get the person to DO something (5   approaches to try)   1. give a short, direct message about what is happening   2. give simple choices about what the person can do   3. ask the person to help you do something   4. ask if the person will give it a try   5.  break down the task - give it one step at a time     ** only ask  Are you ready to   If you are willing to come back later **     2.  Just to have a friendly interaction - to talk to the person    go slow - Go with Flow   2.  acknowledge emotions - \"sounds like , seems like , I can see you are \"   ?  3. use familiar words or phrases (what the person uses)   4. know who the person has been as a person what s/he values   ?  5. use familiar objects, pictures, actions to help & direct   ?   6.be prepared to have the same conversation over & over   ?   7. look interested & friendly   8.be prepared for some emotional outbursts   9.DON'T argue  - BUT don't let the person get into dangerous situations " "    **REMEMBER - the person is doing the BEST that s/he can**    3. Deal with the person's distress or frustration/anger   1.Try to figure out what the person really NEEDS or WANTS (\"It sounds like \" \"It looks like \" \"It seems like \" \"You're feeling \")   2.Use empathy not forced reality or lying   3.Once the person is listening and responding to you THEN -   4.Redirect his attention and actions to something that is OK OR   5.Distract him with other things or activities you know he likes & values     **Always BE CAREFUL about personal space and touch with the person especially when s/he is distressed or being forceful **    4.  How you respond to the person   1.use positive, friendly approval or praise (short, specific and sincere)   2.offer your thanks and appreciation for his/her efforts   3.laugh with him/her & appreciate attempts at humor & friendliness   4.  shake hands to start and end an interaction    5.  use touch - hugging, hand holding, comforting only IF the person wants it     If what you are doing is NOT working -     ? STOP!     ? BACK OFF - give the person some space and time     ? Decide on what to do differently     ? Try Again!       Key Points About 'Who' the person Is .   - preferred name   - introvert or extrovert   - a planner or a doer   - a follower or a leader   - a 'detail' or a 'big picture' person   - work history - favorite and most hated jobs or parts of jobs   - family relationships and history - feelings about   -various family members   - social history - memberships and relationships to friends and groups   - leisure background - favorite activities & beliefs about fun, games, & free time   - previous daily routines and schedules   - personal care habits and preferences   - Gnosticist and spiritual needs and beliefs   - values and interests   - favorite topics, foods, places   - favorite music and songs - dislike of music or songs   - hot buttons & stressors   - behavior under stress " "  - what things help with stress?   - handedness   - level of cognitive impairment   - types of help that are useful       Communication - When Words Don t Work Anymore      Keys to Success:   -Watch movements & actions   -Watch facial expressions and eye movements   -Listen for changes in volume, frequency, and intensity of sounds or words   -Investigate & Check it out   -Meet the need     It s all about Meeting Needs    -Physical needs   -Emotional needs   -Probable Needs:   -Physical  ?  *Tired   *In pain or uncomfortable   *Thirsty or Hungry   *Need to pee or have a BM or already did & need help   *Too hot or too cold     -Emotional   *Afraid   *Lonely   *Bored   *Angry   *Excited     What Can You Do?   -Figure it out Go thru the list   -Meet the need  Offer help that matches need   -Use visual cues more than verbal cues   -Use touch only after  permission  is given   -Connect - Visually, Verbally, Tactilely   -Protect Yourself & the Person - use Hand Under Hand & Supportive Stance techniques   -Reflect - copy expression/tone, repeat some key words, move with the person   -Engage - LISTEN with your head, your heart, and your body  -Respond - try to meet the unmet needs, offer comfort and connection     ** IF IT DOESN T seem to be working - STOP, BACK OFF - and then TRY AGAIN - changing something in your efforts (visually, verbally, or through touch/physical contact)**     Types of Help - Using Your Senses   Visual   -Written Information - Schedules and Notes   -Key Word Signs - locators & identifiers   -Objects in View - familiar items to stimulate task performance   -Gestures - pointing and movements   -Demonstration - provide someone to imitate     2.  Auditory  -Talking and Telling - give information, ask questions, provide choices   -Breaking it Down - Step-by-Step Task Instructions   -Using Simple Words and Phrases - Verbal Cues   -Name Calling - Auditory Attention   -Positive Feedback - praise, \"yes\", " encouragement     3.Tactile - Touch   -Greeting & Comforting - irasema hayes, 'hand-holding'   -Touch for Attention during tasks  - Tactile Guidance - lead through 'once' to get the feel   -Hand-Under-Hand Guidance - palm to palm contact   -Hand-Under-Hand Assistance - physical help   -Dependent Care - doing for & to the person         Page me or re-consult psychiatry as needed.       Kasandra Ruiz, JENSEN, APRN  Consult/Liaison Psychiatry  [facility]   Contact information available via [facility] Paging/Directory.  If I am not available, please call [facility] intake ([phone])

## 2023-10-25 NOTE — PLAN OF CARE
"  Problem: Plan of Care - These are the overarching goals to be used throughout the patient stay.    Goal: Plan of Care Review  Description: The Plan of Care Review/Shift note should be completed every shift.  The Outcome Evaluation is a brief statement about your assessment that the patient is improving, declining, or no change.  This information will be displayed automatically on your shift note.  Outcome: Progressing  Goal: Patient-Specific Goal (Individualized)  Description: You can add care plan individualizations to a care plan. Examples of Individualization might be:  \"Parent requests to be called daily at 9am for status\", \"I have a hard time hearing out of my right ear\", or \"Do not touch me to wake me up as it startles me\".  Outcome: Progressing  Goal: Absence of Hospital-Acquired Illness or Injury  Outcome: Progressing  Intervention: Identify and Manage Fall Risk  Recent Flowsheet Documentation  Taken 10/24/2023 1658 by Kait Worrell RN  Safety Promotion/Fall Prevention: activity supervised  Goal: Optimal Comfort and Wellbeing  Outcome: Progressing  Goal: Readiness for Transition of Care  Outcome: Progressing     Problem: Behavioral Health Comorbidity  Goal: Maintenance of Behavioral Health Symptom Control  Outcome: Progressing  Intervention: Maintain Behavioral Health Symptom Control  Recent Flowsheet Documentation  Taken 10/24/2023 1658 by Kait Worrell RN  Medication Review/Management: medications reviewed     Problem: Orthopaedic Fracture  Goal: Absence of Bleeding  Outcome: Progressing  Goal: Effective Bowel Elimination  Outcome: Progressing  Goal: Absence of Embolism Signs and Symptoms  Outcome: Progressing  Goal: Fracture Stability  Outcome: Progressing  Goal: Optimal Functional Ability  Outcome: Progressing  Goal: Absence of Infection Signs and Symptoms  Outcome: Progressing  Goal: Effective Tissue Perfusion  Outcome: Progressing  Goal: Optimal Pain Control and Function  Outcome: " Progressing  Goal: Effective Oxygenation and Ventilation  Outcome: Progressing     Problem: Risk for Delirium  Goal: Optimal Coping  Outcome: Progressing  Goal: Improved Behavioral Control  Outcome: Progressing  Intervention: Minimize Safety Risk  Recent Flowsheet Documentation  Taken 10/24/2023 1658 by Kait Worrell RN  Communication Enhancement Strategies: extra time allowed for response  Enhanced Safety Measures: room near unit station  Goal: Improved Attention and Thought Clarity  Outcome: Progressing  Intervention: Maximize Cognitive Function  Recent Flowsheet Documentation  Taken 10/24/2023 1658 by Kait Worrell RN  Sensory Stimulation Regulation: care clustered  Reorientation Measures:   calendar in view   clock in view  Goal: Improved Sleep  Outcome: Progressing     Problem: Confusion Chronic  Goal: Optimal Cognitive Function  Outcome: Progressing  Intervention: Minimize Injury Risk and Provide Safety  Recent Flowsheet Documentation  Taken 10/24/2023 1658 by Kait Worrell RN  Enhanced Safety Measures: room near unit station  Intervention: Minimize and Manage Confusion  Recent Flowsheet Documentation  Taken 10/24/2023 1658 by Kait Worrell RN  Sensory Stimulation Regulation: care clustered  Reorientation Measures:   calendar in view   clock in view   Goal Outcome Evaluation:         Pt is alert and oriented x2, pt was calm today and ate his dinner. VSS, and will continue to monitor.

## 2023-10-25 NOTE — PROGRESS NOTES
Care Management Follow Up    Length of Stay (days): 24    Expected Discharge Date: 10/31/2023     Concerns to be Addressed:  complex discharge, behaviors, LTC placement, Psychiatry consult       Patient plan of care discussed at interdisciplinary rounds: Yes     Anticipated Discharge Disposition:  memory care     Anticipated Discharge Services:  memory care     Anticipated Discharge DME:  baron lift     Education Provided on the Discharge Plan:  per care team     Patient/Family in Agreement with the Plan:       Referrals Placed by CM/SW:  memory care     Private pay costs discussed: per facility     Additional Information:  Patient with severe dementia with behavioral disturbance who lives in a senior memory care unit at [place] was brought in due to recurrent falls.  He sustained acute on chronic C7 and acute on chronic T3 fracture. Neurosurgery was consulted and recommended conservative treatment.     10/25 Back on 1:1 . Last IM Zyprexa was 10/19.  Psychiatry consulted.    Patient requiring 2 person assist or baron.         Social History:  Patient is unable to return to [facility] at this time due to level of agitation and his required level of care.  In order for pt to return to [facility], he would need to be able to transfer and ambulate w/walker independently.        10/25/23:  Unable to place patient at this time due to unmanageable behaviors for a community setting. Psychiatry consulted 10/24.     Spoke with Enriqueta from [facility] 10/25. She states they will continue to follow. Patient will need to be assist of 2 only or baron, no IV/IM medications for behaviors and have 5 out of 7 days with no real behaviors or agitation.          Crystal Ray RN

## 2023-10-25 NOTE — PLAN OF CARE
Problem: Behavioral Health Comorbidity  Goal: Maintenance of Behavioral Health Symptom Control  Outcome: Not Progressing     Problem: Orthopaedic Fracture  Goal: Optimal Pain Control and Function  Outcome: Progressing     Problem: Orthopaedic Fracture  Goal: Effective Oxygenation and Ventilation  Outcome: Progressing     Problem: Confusion Chronic  Goal: Optimal Cognitive Function  Outcome: Progressing   Goal Outcome Evaluation:    Patient agitated and restless during the shift. Received prn Seroquel at start of shift and prn tylenol with not much effect. Was getting out of bed frequently setting bed alarm off. Not easily redirectable. Verbally and physically aggressive with staff.Swearing, spitting and kicking. Needing 2 to 3 people for cares. He removed his clothes and cervical collar and had to be reapplied. Incontinent of urine. Incontinent cares completed.  Fluids and snacks offered. Frequent checks done to ensure safety.     0430: staff pulled from floor to be 1:1 sitter due to patient continuing to remove his cervical collar and throwing it in room. Patient still restless in bed with sitter in room. Able to eventually fall asleep.

## 2023-10-25 NOTE — PLAN OF CARE
Goal Outcome Evaluation:     Pt is A&O to self, calm and cooperative today. Up in chair about 1030am with heavy assist with 2 people.. Denied pain and SOB. Denied numbness and tingling, pulses +,   miami j in place. Pt is incontinent. Eating/drinking well. VSS  Received bed bath and shaved this afternoon.  Problem: Plan of Care - These are the overarching goals to be used throughout the patient stay.    Goal: Optimal Comfort and Wellbeing  Outcome: Progressing  Intervention: Provide Person-Centered Care  Recent Flowsheet Documentation  Taken 10/25/2023 0747 by Eleonora Moreno RN  Trust Relationship/Rapport:   care explained   choices provided   questions encouraged     Problem: Behavioral Health Comorbidity  Goal: Maintenance of Behavioral Health Symptom Control  Outcome: Progressing  Intervention: Maintain Behavioral Health Symptom Control  Recent Flowsheet Documentation  Taken 10/25/2023 0747 by Eleonora Moreno RN  Medication Review/Management: medications reviewed     Problem: Orthopaedic Fracture  Goal: Absence of Bleeding  Outcome: Progressing  Goal: Effective Bowel Elimination  Outcome: Progressing  Goal: Absence of Embolism Signs and Symptoms  Outcome: Progressing  Goal: Absence of Infection Signs and Symptoms  Outcome: Progressing     Problem: Confusion Chronic  Goal: Optimal Cognitive Function  Outcome: Progressing  Intervention: Minimize Injury Risk and Provide Safety  Recent Flowsheet Documentation  Taken 10/25/2023 0747 by Eleonora Moreno RN  Enhanced Safety Measures:   room near unit station    at bedside     Problem: Plan of Care - These are the overarching goals to be used throughout the patient stay.    Goal: Absence of Hospital-Acquired Illness or Injury  Intervention: Identify and Manage Fall Risk  Recent Flowsheet Documentation  Taken 10/25/2023 0747 by Eleonora Moreno RN  Safety Promotion/Fall Prevention:   activity supervised   bedside attendant   assistive device/personal  items within reach   nonskid shoes/slippers when out of bed   patient and family education   room door open   room near nurse's station   supervised activity     Problem: Orthopaedic Fracture  Goal: Optimal Functional Ability  Intervention: Optimize Functional Ability  Recent Flowsheet Documentation  Taken 10/25/2023 0747 by Eleonora Moreno RN  Activity Management: activity encouraged  Goal: Effective Oxygenation and Ventilation  Intervention: Promote Airway Secretion Clearance  Recent Flowsheet Documentation  Taken 10/25/2023 0747 by Eleonora Moreno RN  Activity Management: activity encouraged     Problem: Violence Risk or Actual  Goal: Anger and Impulse Control  Intervention: Minimize Safety Risk  Recent Flowsheet Documentation  Taken 10/25/2023 0747 by Eleonora Moreno RN  Enhanced Safety Measures:   room near unit station    at bedside     Problem: Risk for Delirium  Goal: Improved Behavioral Control  Intervention: Minimize Safety Risk  Recent Flowsheet Documentation  Taken 10/25/2023 0747 by Eleonora Moreno RN  Enhanced Safety Measures:   room near unit station    at bedside  Trust Relationship/Rapport:   care explained   choices provided   questions encouraged     Problem: Seclusion/Restraint, Behavioral  Goal: Absence of Harm or Injury  Intervention: Protect Dignity, Rights, and Personal Wellbeing  Recent Flowsheet Documentation  Taken 10/25/2023 0747 by Eleonora Moreno RN  Trust Relationship/Rapport:   care explained   choices provided   questions encouraged     Problem: Restraint, Nonviolent  Goal: Absence of Harm or Injury  Intervention: Protect Dignity, Rights and Personal Wellbeing  Recent Flowsheet Documentation  Taken 10/25/2023 0747 by Eleonora Moreno RN  Trust Relationship/Rapport:   care explained   choices provided   questions encouraged     Problem: Suicide Risk  Goal: Absence of Self-Harm  Intervention: Assess Risk to Self and Maintain Safety  Recent Flowsheet  Documentation  Taken 10/25/2023 0747 by Eleonora Moreno, RN  Enhanced Safety Measures:   room near unit station    at bedside

## 2023-10-25 NOTE — PROGRESS NOTES
[facility]    Medicine Progress Note - Hospitalist Service    Date of Admission:  9/29/2023    Assessment & Plan   Rosemary Farmer is a 88-year-old with severe dementia with behavioral disturbance who lives in a senior memory care unit at [place] [facility] was brought in due to recurrent falls.  He sustained acute on chronic C7 and acute on chronic T3 fracture. Neurosurgery was consulted and recommended conservative treatment.      10/25: last 1:1 since 10/25 but now discontinued, last dose of IM medication was olanzapine 10/19     Acute/subacute C7 and T3 fracture   Chronic T1 fracture  Mechanical fall, initial encounter  - CT of head did not show any acute abnormality.   -MRI cervical spine 9/29: superior endplate compression fracture of the C7 vertebrae with loss of 15% of vertebral body height. Subtle high signal change within the anterior/superior margin of the C7 vertebrae suggests that this fracture is acute/subacute in nature. chronic compression fracture of the T1 vertebrae, acute/subacute T3 vertebrae   -Spine surgery consulted, recommend conservative management, cervical collar x 6-12 weeks time considering  is acute to subacute with follow up in 6 weeks time.       Essential Hypertension with am hypotenison  - Norvasc at 2.5 mg po daily due to am hypotension, with holding parameters     Acute encephalopathy  Lewy body dementia with parkinsonism  - Baseline dementia with behavioral disturbance, has confusion and anxiety at baseline  - worsened Hyperactive delirium after admission which has now resolved  - On seroquel 12.5 mg bid, 50 mg at bedtime, 25 mg bid prn for agitation; ramelteon 8 mg at bedtime, trazodone 100 mg at bedtime   - High-dose Sinemet was DC'd on 9/30.  Patient had agitation on  of Sinemet.  As per his geriatrician plan we will continue to taper off Sinemet.  Currently on 5-50 BID, will switch to 5-50 daily on 11/2 for 14 days then stop  - IM olanzapine 2.5 mg twice  "daily as needed for agitation available. Last dose given 10/19  -Psychiatry to see today     Neuropathy   - On lyrica 50 mg bid     History of a fib  - S/p pacemaker  - No anticoagulation because of fall     Weakness and deconditioning  - S/p recurrent falls  - Patient needs long-term care placement   -TCU refused due to agitation, encephalopathy and dementia.   -SW for long term memory care placement          Diet: Regular Diet Adult    DVT Prophylaxis: Pneumatic Compression Devices  Manzo Catheter: Not present  Lines: None     Cardiac Monitoring: None  Code Status: No CPR- Do NOT Intubate      Clinically Significant Risk Factors                      # Dementia: noted on problem list    # Cachexia: Estimated body mass index is 17.98 kg/m  as calculated from the following:    Height as of this encounter: 1.93 m (6' 4\").    Weight as of this encounter: 67 kg (147 lb 11.3 oz).   # Moderate Malnutrition: based on nutrition assessment           Disposition Plan      Expected Discharge Date: 10/31/2023    Discharge Delays: Complex Discharge  Placement - LTC  Specialist Consult (enter specialist & decision needed in comments)  Other (Add Comment)    Discharge Comments: 1:1 off 0830 10/20/2023 needs placement            Annia Rosas MD  Hospitalist Service  [facility]  Securely message with [employer] (more info)  Text page via [employer] Paging/Directory   ______________________________________________________________________    Interval History   Mr. Farmer had a bad night last night.  He was agitated but able to be calmed by his wife.  Due to this he was on a one-to-one until this morning when it was discontinued.  Has been having good behaviors throughout the day today.  He is having no complaints.    Physical Exam   Vital Signs: Temp: 98  F (36.7  C) Temp src: Oral BP: 119/62 Pulse: 65   Resp: 18 SpO2: 96 % O2 Device: None (Room air)    Weight: 147 lbs 11.33 oz    General Appearance: Awake, " alert, in no acute distress  Respiratory: CTAB, no wheeze  Cardiovascular: RRR  GI: soft, nontender, non distended, normal bowel sounds  Skin: no jaundice, no rash    Medical Decision Making       30 MINUTES SPENT BY ME on the date of service doing chart review, history, exam, documentation & further activities per the note.      Data         Imaging results reviewed over the past 24 hrs:   No results found for this or any previous visit (from the past 24 hour(s)).

## 2023-10-25 NOTE — CONSULTS
"Triage and Transition - Consult and Liaison     Rosemary Farmre  October 25, 2023    Session start: 11:10 am  Session end: 11:27  Session duration in minutes: 17  CPT utilized: 32950 - Brief diagnostic assessment (modifier 52)  Patient was seen virtually ([employer] cart or other teleconferencing device).    Diagnosis:     F02.81 Dementia with behavioral disturbance, present and by hx, per chart review.    Plan/Recommendations:   Continue care coordination with care team.  This writers role is to address sx, hx, dx, before psychiatry provider sees pt.  Pt does not appear agitated at the time of this note, he appeared calm, and did answer some questions, stating he feels \"much better now, pretty normal\", he did appear confused at times. Pt with hx of dementia per chart review.  Will ask Psychiatry provider to follow-up, left message.  Next steps include: Psychiatry provider follow up.   Please enter another psychiatry if further visits are needed. Patients are not followed by Psychiatry C&L Service unless otherwise indicated.     Reason for consult: Psychiatry consult was requested due to \"dementia with behavioral disturbance\" - per consult reason. Patient was seen by Triage and Transition Consult & Liaison team.    Per H&P and chart review:  \"Rosemary Farmer is a 88 year old male admitted on 9/29/2023. H/o dementia with behavioral disturbance and lives in [facility]. Detailed history was unavailable as patient is confused and patient's wife provided information that she knew of. Patient had a fall earlier this morning at 2 am at the facility following which he has been significantly confused and agitated. He is partially oriented at baseline per wife. Patient subsequently came to the ER for further evaluation and management. He was found to have C7#, T1#, T3#, acute confusional state with behavioral disturbance.\"        Identifying information: Rosemary is 88 year old White  male   followed related to " "\"dementia with behavioral disturbance\" - per consult reason.     Summary of Patient Situation  Pt sitting up in bed upon TH arrival, he appears calm and pleasant at this time, however care team notes indicate some difference.  Pt states his current mood as \"Im feeling much better now, feeling pretty normal\", he does appear confused at times, with notable hx of dementia, per chart review.      Per chart review, pt with no notable depression or anxiety dx hx.  Pt did report he did not sleep well last night, does appear a sleep dx noted in his chart and problem list. Pt tells me he has good appetite, poor sleep.  Pt with some tangential speech towards the end of conversation.     Per chart review, pt with meds including lyrica, seroquel, trazodone, sinemet, and last IM zyprexa noted in chart and RN 10/19.  Will ask psychiatry provider to follow up with pt, psych provider notified, left message.     Significant Clinical History  Per chart review, pt with a hx of dementia with behavioral disturbance, sleep behavior disorder, no notable psych hosp hx, with information available in epic ehr at the time of this note.       Collateral information:   Reviewed chart, coordinated with care team/RN, and coordinated with Psychiatry provider for pt follow up, psych provider notified, left message.  Update provided to care team including HARDEEP Crews.      Mental Status Exam   Affect: Appropriate  Appearance: Appropriate   Attention Span/Concentration: Attentive    Eye Contact: Variable  Fund of Knowledge: Other: confused    Language /Speech Content: Fluent  Language /Speech Volume: Soft   Language /Speech Rate/Productions: Minimally Responsive   Recent Memory: Variable  Remote Memory: Variable  Mood: Normal   Orientation:   Person: Yes   Place: No  Time of Day: No   Date: No   Situation (Do they understand why they are here?): No   Psychomotor Behavior: Underactive   Thought Content: Other: .  Thought Form: Tangential    Current " medications: Per ehr at the time of this note.  Current Facility-Administered Medications   Medication    acetaminophen (TYLENOL) tablet 975 mg    amLODIPine (NORVASC) tablet 2.5 mg    bisacodyl (DULCOLAX) EC tablet 5 mg    Or    bisacodyl (DULCOLAX) EC tablet 10 mg    carbidopa-levodopa half-tab 5-50 mg    Followed by    [START ON 11/2/2023] carbidopa-levodopa half-tab 5-50 mg    dorzolamide-timolol (COSOPT) ophthalmic solution 1 drop    hydrALAZINE (APRESOLINE) tablet 20 mg    latanoprost (XALATAN) 0.005 % ophthalmic solution 1 drop    Lidocaine (LIDOCARE) 4 % Patch 1 patch    multivitamin w/minerals (THERA-VIT-M) tablet 1 tablet    OLANZapine (zyPREXA) injection 2.5 mg    pregabalin (LYRICA) capsule 50 mg    QUEtiapine (SEROquel) half-tab 12.5 mg    QUEtiapine (SEROquel) tablet 25 mg    QUEtiapine (SEROquel) tablet 50 mg    ramelteon (ROZEREM) half-tab 4 mg    senna-docusate (SENOKOT-S/PERICOLACE) 8.6-50 MG per tablet 1 tablet    Or    senna-docusate (SENOKOT-S/PERICOLACE) 8.6-50 MG per tablet 2 tablet    traZODone (DESYREL) tablet 100 mg        Therapeutic intervention and progress:  Therapeutic intervention consisted of building therapeutic rapport, active listening, and validation.         Vilma MEHTA Psychotherapist Trainee  Triage and Transition - Consult and Liaison   [phone]

## 2023-10-26 NOTE — PROGRESS NOTES
[facility]    Medicine Progress Note - Hospitalist Service    Date of Admission:  9/29/2023    Assessment & Plan   Rosemary Farmer is a 88-year-old with severe dementia with behavioral disturbance who lives in a senior memory care unit at [place] was brought in due to recurrent falls.  He sustained acute on chronic C7 and acute on chronic T3 fracture. Neurosurgery was consulted and recommended conservative treatment.      10/26: last 1:1 since 10/25 now discontinued (most recent prior to this was 10/20), last dose of IM medication was olanzapine 10/19     Acute/subacute C7 and T3 fracture   Chronic T1 fracture  Mechanical fall, initial encounter  - CT of head did not show any acute abnormality.   -MRI cervical spine 9/29: superior endplate compression fracture of the C7 vertebrae with loss of 15% of vertebral body height. Subtle high signal change within the anterior/superior margin of the C7 vertebrae suggests that this fracture is acute/subacute in nature. chronic compression fracture of the T1 vertebrae, acute/subacute T3 vertebrae   -Spine surgery consulted, recommend conservative management, cervical collar x 6-12 weeks time considering  is acute to subacute with follow up in 6 weeks time.       Essential Hypertension with am hypotenison  - Norvasc at 2.5 mg po daily due to am hypotension, with holding parameters     Acute encephalopathy  Lewy body dementia with parkinsonism  - Baseline dementia with behavioral disturbance, has confusion and anxiety at baseline  - worsened Hyperactive delirium after admission which has now resolved  - On seroquel 12.5 mg bid, 50 mg at bedtime, 25 mg bid prn for agitation; ramelteon 8 mg at bedtime, trazodone 100 mg at bedtime   - High-dose Sinemet was DC'd on 9/30.  Patient had agitation on  of Sinemet.  As per his geriatrician plan we will continue to taper off Sinemet.  Currently on 5-50 BID, will switch to 5-50 daily on 11/2 for 14 days then  "stop  - IM olanzapine 2.5 mg twice daily as needed for agitation available. Last dose given 10/19  -Psychiatry to see today     Neuropathy   - On lyrica 50 mg bid     History of a fib  - S/p pacemaker  - No anticoagulation because of fall     Weakness and deconditioning  - S/p recurrent falls  - Patient needs long-term care placement   -TCU refused due to agitation, encephalopathy and dementia.   -SW for long term memory care placement          Diet: Regular Diet Adult    DVT Prophylaxis: Pneumatic Compression Devices  Manzo Catheter: Not present  Lines: None     Cardiac Monitoring: None  Code Status: No CPR- Do NOT Intubate      Clinically Significant Risk Factors                      # Dementia: noted on problem list    # Cachexia: Estimated body mass index is 17.98 kg/m  as calculated from the following:    Height as of this encounter: 1.93 m (6' 4\").    Weight as of this encounter: 67 kg (147 lb 11.3 oz).   # Moderate Malnutrition: based on nutrition assessment           Disposition Plan      Expected Discharge Date: 10/31/2023    Discharge Delays: Complex Discharge  Placement - LTC  Specialist Consult (enter specialist & decision needed in comments)  Other (Add Comment)    Discharge Comments: 1:1 off 0830 10/20/2023 needs placement            Annia Rosas MD  Hospitalist Service  [facility]  Securely message with [employer] (more info)  Text page via [employer] Paging/Directory   ______________________________________________________________________    Interval History   Mr. Farmer is doing well today.  No complaints.  Eating well.  Not having any behaviors today    Physical Exam   Vital Signs: Temp: 98.1  F (36.7  C) Temp src: Oral BP: (!) 140/65 Pulse: 60   Resp: 18 SpO2: 97 % O2 Device: None (Room air)    Weight: 147 lbs 11.33 oz    General Appearance: Awake, alert, in no acute distress  Respiratory: CTAB, no wheeze  Cardiovascular: RRR  GI: soft, nontender, non distended, normal bowel " sounds  Skin: no jaundice, no rash      Medical Decision Making       30 MINUTES SPENT BY ME on the date of service doing chart review, history, exam, documentation & further activities per the note.      Data         Imaging results reviewed over the past 24 hrs:   No results found for this or any previous visit (from the past 24 hour(s)).

## 2023-10-26 NOTE — PLAN OF CARE
Problem: Pain Acute  Goal: Optimal Pain Control and Function  Outcome: Progressing    Problem: Confusion Chronic  Goal: Optimal Cognitive Function  Outcome: Progressing    Pt is alert to self. Started out this morning with some agitation when trying to assist pt into the chair. Assisted pt back to bed and tried later in the morning with success. Pt up in chair late morning til late afternoon. Pt pleasant and cooperative the rest of the day. Ate 100% for all meals. Feeding self at supper with minimal assistance. Used urinal this afternoon. Pt likes pills crushed in chocolate pudding or ice cream. Has left [facility] on all day. Used Sit to stand to transfer to chair and A2 to transfer back to bed.

## 2023-10-26 NOTE — PLAN OF CARE
"Pt up in chair initially and attempting to get up not allowing staff to safely assist pt back in chair, grabbing at hands and saying \"No\"    Pt assist of three with hojer back to bed and PRN Zyprexa given and writer assisted in feeding pt turkey sandwick; effective.  Pt sleeping throughout night between cares and rounding.  Incontinent on bladder.    Yumi Palma RN                          "

## 2023-10-26 NOTE — PROGRESS NOTES
Care Management Follow Up    Length of Stay (days): 25    Expected Discharge Date: 10/31/2023       Concerns to be Addressed:  complex discharge, behaviors, LTC placement, medications adjusted       Patient plan of care discussed at interdisciplinary rounds: Yes     Anticipated Discharge Disposition:  memory care     Anticipated Discharge Services:  memory care     Anticipated Discharge DME:  baron lift     Education Provided on the Discharge Plan:  per care team     Patient/Family in Agreement with the Plan:       Referrals Placed by CM/SW:  memory care     Private pay costs discussed: per facility     Additional Information:  Patient with severe dementia with behavioral disturbance who lives in a senior memory care unit at [place] was brought in due to recurrent falls.  He sustained acute on chronic C7 and acute on chronic T3 fracture. Neurosurgery was consulted and recommended conservative treatment.     Psychiatry consulted 10/26.    IM Zyprexa:  Last dose 10/19.    1:1 status:  10/24 off   10/25 on early morning    10/25  0730 off 1:1        Patient requiring 2 person assist or baron.         Social History:  Patient is unable to return to [facility] at this time due to level of agitation and his required level of care.  In order for pt to return to [place], he would need to be able to transfer and ambulate w/walker independently.    Enriqueta from [facility] following. Patient will need to be assist of 2 only or baron, no IV/IM medications for behaviors and have 5 out of 7 days with no real behaviors or agitation.       10/26/23:  Continues off 1:1. No use of Zyprexa IM. Will continue to follow for improvement with  behaviors.      Crystal Ray RN

## 2023-10-27 NOTE — PROGRESS NOTES
Care Management Follow Up    Length of Stay (days): 26    Expected Discharge Date: 10/31/2023     Concerns to be Addressed:    1:1 and IM zyprexa   Patient plan of care discussed at interdisciplinary rounds: Yes    Anticipated Discharge Disposition:  memory care     Anticipated Discharge Services:  memory care  Anticipated Discharge DME:      Patient/family educated on Medicare website which has current facility and service quality ratings:  yes  Education Provided on the Discharge Plan:  yes  Patient/Family in Agreement with the Plan:  looking for memory care    Referrals Placed by CM/SW:  multiple  Private pay costs discussed: for memory care    Additional Information:  I spoke with Ashley at [facility], [phone]. They need patient to be off IM zyprexa and no 1:1 x several days. Fax nursing notes to .    Nursing notes faxed to . RN:RN report called by Ezio Chakraborty..  ZACH Gan

## 2023-10-27 NOTE — PLAN OF CARE
"  Problem: Plan of Care - These are the overarching goals to be used throughout the patient stay.    Goal: Plan of Care Review  Description: The Plan of Care Review/Shift note should be completed every shift.  The Outcome Evaluation is a brief statement about your assessment that the patient is improving, declining, or no change.  This information will be displayed automatically on your shift note.  Outcome: Progressing     Problem: Plan of Care - These are the overarching goals to be used throughout the patient stay.    Goal: Patient-Specific Goal (Individualized)  Description: You can add care plan individualizations to a care plan. Examples of Individualization might be:  \"Parent requests to be called daily at 9am for status\", \"I have a hard time hearing out of my right ear\", or \"Do not touch me to wake me up as it startles me\".  Outcome: Progressing     Problem: Behavioral Health Comorbidity  Goal: Maintenance of Behavioral Health Symptom Control  Outcome: Progressing     Problem: Violence Risk or Actual  Goal: Anger and Impulse Control  Intervention: Minimize Safety Risk  Recent Flowsheet Documentation  Taken 10/27/2023 0910 by Ezio Chakraborty, RN  Enhanced Safety Measures: room near unit station   Goal Outcome Evaluation:  No verbal or nonverbal expressions of pain, ate 100% during meals, received PRN Seroquel and Zyprexa PO.                      "

## 2023-10-27 NOTE — PLAN OF CARE
Problem: Behavioral Health Comorbidity  Goal: Maintenance of Behavioral Health Symptom Control  Outcome: Progressing     Problem: Risk for Delirium  Goal: Improved Behavioral Control  Outcome: Progressing  Intervention: Prevent and Manage Agitation  Recent Flowsheet Documentation  Taken 10/27/2023 0044 by Jose Iraheta, RN  Environment Familiarity/Consistency: daily routine followed  Intervention: Minimize Safety Risk  Recent Flowsheet Documentation  Taken 10/27/2023 0044 by Jose Iraheta, RN  Communication Enhancement Strategies: call light answered in person  Enhanced Safety Measures: room near unit station  Goal: Improved Sleep  Outcome: Progressing   Goal Outcome Evaluation:       Patient periodically setting off bed alarm but has been easily redirected. Patient pleasant and cooperative with cares.

## 2023-10-28 NOTE — PLAN OF CARE
Problem: Plan of Care - These are the overarching goals to be used throughout the patient stay.    Goal: Absence of Hospital-Acquired Illness or Injury  Intervention: Identify and Manage Fall Risk  Recent Flowsheet Documentation  Taken 10/28/2023 0000 by Jennifer Marion RN  Safety Promotion/Fall Prevention:   activity supervised   assistive device/personal items within reach   room door open   room near nurse's station   safety round/check completed   Goal Outcome Evaluation: No fall overnight. Doesn't use call light. Oriented only to self, did not attempt to get out of bed unassisted.    Pleasant and calm. Incontinent, allowed hygiene cares without problems.

## 2023-10-28 NOTE — PROGRESS NOTES
"Care Management Follow Up    Length of Stay (days): 27    Expected Discharge Date: 10/31/2023     Concerns to be Addressed:  Placement; requiring PRN medication for agitation;   Patient plan of care discussed at interdisciplinary rounds: Yes    Anticipated Discharge Disposition:  memory care     Anticipated Discharge Services:  memory care  Anticipated Discharge DME:  Per therapy (if indicated).    Patient/family educated on Medicare website which has current facility and service quality ratings:  yes  Education Provided on the Discharge Plan:  yes  Patient/Family in Agreement with the Plan:  looking for memory care    Referrals Placed by CM/SW:  multiple  Private pay costs discussed: for memory care    Additional information  On 10/27, the SW spoke with Ashley at [facility], [phone]. They need patient to be off IM zyprexa and no 1:1 for \"several days\". Nursing notes faxed to [phone] and RN:RN report called by nursing (10/27).    Filomena Leiva RN     "

## 2023-10-28 NOTE — PROGRESS NOTES
[facility]    Medicine Progress Note - Hospitalist Service    Date of Admission:  9/29/2023    Assessment & Plan   Rosemary Farmer is a 88-year-old with severe dementia with behavioral disturbance who lives in a senior memory care unit at [place] [facility] was brought in due to recurrent falls.  He sustained acute on chronic C7 and acute on chronic T3 fracture. Neurosurgery was consulted and recommended conservative treatment.       10/28: last 1:1 since 10/25 now discontinued (most recent prior to this was 10/20), last dose of IM medication was olanzapine 10/19     Acute/subacute C7 and T3 fracture   Chronic T1 fracture  Mechanical fall, initial encounter  - CT of head did not show any acute abnormality.   -MRI cervical spine 9/29: superior endplate compression fracture of the C7 vertebrae with loss of 15% of vertebral body height. Subtle high signal change within the anterior/superior margin of the C7 vertebrae suggests that this fracture is acute/subacute in nature. chronic compression fracture of the T1 vertebrae, acute/subacute T3 vertebrae   -Spine surgery consulted, recommend conservative management, cervical collar x 6-12 weeks time considering  is acute to subacute with follow up in 6 weeks time.       Essential Hypertension with am hypotenison  - Norvasc at 2.5 mg po daily due to am hypotension, with holding parameters     Acute encephalopathy  Lewy body dementia with parkinsonism  - Baseline dementia with behavioral disturbance, has confusion and anxiety at baseline  - worsened Hyperactive delirium after admission which has now resolved  - On seroquel 12.5 mg bid, 50 mg at bedtime, 25 mg bid prn for agitation; ramelteon 8 mg at bedtime, trazodone 100 mg at bedtime   - High-dose Sinemet was DC'd on 9/30.  Patient had agitation on  of Sinemet.  As per his geriatrician plan we will continue to taper off Sinemet.  Currently on 5-50 BID, will switch to 5-50 daily on 11/2 for 14 days then  "stop  - IM olanzapine 2.5 mg twice daily as needed for agitation available. Last dose given 10/19  -Psychiatry consult appreciated     Neuropathy   - On lyrica 50 mg bid     History of a fib  - S/p pacemaker  - No anticoagulation because of fall     Weakness and deconditioning  - S/p recurrent falls  - Patient needs long-term care placement   -TCU refused due to agitation, encephalopathy and dementia.   -SW for long term memory care placement          Diet: Regular Diet Adult    DVT Prophylaxis: Pneumatic Compression Devices  Manzo Catheter: Not present  Lines: None     Cardiac Monitoring: None  Code Status: No CPR- Do NOT Intubate      Clinically Significant Risk Factors                      # Dementia: noted on problem list    # Cachexia: Estimated body mass index is 17.98 kg/m  as calculated from the following:    Height as of this encounter: 1.93 m (6' 4\").    Weight as of this encounter: 67 kg (147 lb 11.3 oz).   # Moderate Malnutrition: based on nutrition assessment           Disposition Plan     Expected Discharge Date: 10/31/2023    Discharge Delays: Complex Discharge  Placement - LTC  Specialist Consult (enter specialist & decision needed in comments)  Other (Add Comment)    Discharge Comments: 1:1 off 0830 10/20/2023 needs placement            Arpita La MD  Hospitalist Service  [facility]  Securely message with [employer] (more info)  Text page via [employer] Paging/Directory   ______________________________________________________________________    Interval History   Patient is lying comfortably in the bed, no complaints  Did not require sitter nor IV medication for anxiety    Physical Exam   Vital Signs: Temp: 97.7  F (36.5  C) Temp src: Axillary BP: 124/62 Pulse: 64   Resp: 20 SpO2: 98 % O2 Device: None (Room air)    Weight: 147 lbs 11.33 oz    General Appearance:  Awake, alert, in no acute distress  Respiratory: CTAB, no wheeze  Cardiovascular: RRR  GI: soft, nontender, non " distended, normal bowel sounds  Skin: no jaundice, no rash    Medical Decision Making       25 MINUTES SPENT BY ME on the date of service doing chart review, history, exam, documentation & further activities per the note.      Data         Imaging results reviewed over the past 24 hrs:   No results found for this or any previous visit (from the past 24 hour(s)).

## 2023-10-28 NOTE — PLAN OF CARE
Problem: Plan of Care - These are the overarching goals to be used throughout the patient stay.    Goal: Absence of Hospital-Acquired Illness or Injury  Outcome: Progressing     Problem: Plan of Care - These are the overarching goals to be used throughout the patient stay.    Goal: Optimal Comfort and Wellbeing  Outcome: Progressing     Problem: Behavioral Health Comorbidity  Goal: Maintenance of Behavioral Health Symptom Control  Outcome: Progressing     Problem: Orthopaedic Fracture  Goal: Absence of Bleeding  Outcome: Progressing  Goal: Effective Bowel Elimination  Outcome: Progressing  Goal: Absence of Embolism Signs and Symptoms  Outcome: Progressing  Goal: Fracture Stability  Outcome: Progressing  Goal: Optimal Functional Ability  Outcome: Progressing  Goal: Absence of Infection Signs and Symptoms  Outcome: Progressing  Goal: Effective Tissue Perfusion  Outcome: Progressing  Goal: Optimal Pain Control and Function  Outcome: Progressing  Goal: Effective Oxygenation and Ventilation  Outcome: Progressing     Problem: Risk for Delirium  Goal: Optimal Coping  Outcome: Progressing  Goal: Improved Behavioral Control  Outcome: Progressing  Goal: Improved Attention and Thought Clarity  Outcome: Progressing  Goal: Improved Sleep  Outcome: Progressing     Problem: Confusion Chronic  Goal: Optimal Cognitive Function  Outcome: Progressing     Problem: Pain Acute  Goal: Optimal Pain Control and Function  Outcome: Progressing     Pt remains confused and restless at times, but was able to be redirected. Pt took meds crushed in chocolate ice cream this HS. Pt ate % of dinner. Incontinent cares performed.

## 2023-10-28 NOTE — PLAN OF CARE
"  Problem: Plan of Care - These are the overarching goals to be used throughout the patient stay.    Goal: Plan of Care Review  Description: The Plan of Care Review/Shift note should be completed every shift.  The Outcome Evaluation is a brief statement about your assessment that the patient is improving, declining, or no change.  This information will be displayed automatically on your shift note.  Outcome: Progressing     Problem: Plan of Care - These are the overarching goals to be used throughout the patient stay.    Goal: Patient-Specific Goal (Individualized)  Description: You can add care plan individualizations to a care plan. Examples of Individualization might be:  \"Parent requests to be called daily at 9am for status\", \"I have a hard time hearing out of my right ear\", or \"Do not touch me to wake me up as it startles me\".  Outcome: Progressing     Problem: Plan of Care - These are the overarching goals to be used throughout the patient stay.    Goal: Absence of Hospital-Acquired Illness or Injury  Intervention: Identify and Manage Fall Risk  Recent Flowsheet Documentation  Taken 10/28/2023 1400 by Ezio Chakraborty, RN  Safety Promotion/Fall Prevention: activity supervised   Goal Outcome Evaluation:  No verbal or nonverbal expressions of pain, received PRN Zyprexa PO which was effective, eating 100 % of meals.VS WDL                      "

## 2023-10-28 NOTE — PROGRESS NOTES
[facility]    Medicine Progress Note - Hospitalist Service    Date of Admission:  9/29/2023    Assessment & Plan   Rosemary Farmer is a 88-year-old with severe dementia with behavioral disturbance who lives in a senior memory care unit at [place] [facility] was brought in due to recurrent falls.  He sustained acute on chronic C7 and acute on chronic T3 fracture. Neurosurgery was consulted and recommended conservative treatment.      10/27: last 1:1 since 10/25 now discontinued (most recent prior to this was 10/20), last dose of IM medication was olanzapine 10/19     Acute/subacute C7 and T3 fracture   Chronic T1 fracture  Mechanical fall, initial encounter  - CT of head did not show any acute abnormality.   -MRI cervical spine 9/29: superior endplate compression fracture of the C7 vertebrae with loss of 15% of vertebral body height. Subtle high signal change within the anterior/superior margin of the C7 vertebrae suggests that this fracture is acute/subacute in nature. chronic compression fracture of the T1 vertebrae, acute/subacute T3 vertebrae   -Spine surgery consulted, recommend conservative management, cervical collar x 6-12 weeks time considering  is acute to subacute with follow up in 6 weeks time.       Essential Hypertension with am hypotenison  - Norvasc at 2.5 mg po daily due to am hypotension, with holding parameters     Acute encephalopathy  Lewy body dementia with parkinsonism  - Baseline dementia with behavioral disturbance, has confusion and anxiety at baseline  - worsened Hyperactive delirium after admission which has now resolved  - On seroquel 12.5 mg bid, 50 mg at bedtime, 25 mg bid prn for agitation; ramelteon 8 mg at bedtime, trazodone 100 mg at bedtime   - High-dose Sinemet was DC'd on 9/30.  Patient had agitation on  of Sinemet.  As per his geriatrician plan we will continue to taper off Sinemet.  Currently on 5-50 BID, will switch to 5-50 daily on 11/2 for 14 days then  "stop  - IM olanzapine 2.5 mg twice daily as needed for agitation available. Last dose given 10/19  -Psychiatry consulted     Neuropathy   - On lyrica 50 mg bid     History of a fib  - S/p pacemaker  - No anticoagulation because of fall     Weakness and deconditioning  - S/p recurrent falls  - Patient needs long-term care placement   -TCU refused due to agitation, encephalopathy and dementia.   -SW for long term memory care placement          Diet: Regular Diet Adult    DVT Prophylaxis: Pneumatic Compression Devices  Manzo Catheter: Not present  Lines: None     Cardiac Monitoring: None  Code Status: No CPR- Do NOT Intubate      Clinically Significant Risk Factors                      # Dementia: noted on problem list    # Cachexia: Estimated body mass index is 17.98 kg/m  as calculated from the following:    Height as of this encounter: 1.93 m (6' 4\").    Weight as of this encounter: 67 kg (147 lb 11.3 oz).   # Moderate Malnutrition: based on nutrition assessment           Disposition Plan      Expected Discharge Date: 10/31/2023    Discharge Delays: Complex Discharge  Placement - LTC  Specialist Consult (enter specialist & decision needed in comments)  Other (Add Comment)    Discharge Comments: 1:1 off 0830 10/20/2023 needs placement            Annia Rosas MD  Hospitalist Service  [facility]  Securely message with [employer] (more info)  Text page via [employer] Paging/Directory   ______________________________________________________________________    Interval History   Mr. Farmer is good. No complaints. No sitter or IV meds needed.     Physical Exam   Vital Signs: Temp: 98.3  F (36.8  C) Temp src: Oral BP: (!) 141/70 Pulse: 74   Resp: 18 SpO2: 96 % O2 Device: None (Room air)    Weight: 147 lbs 11.33 oz    General Appearance: Awake, alert, in no acute distress  Respiratory: CTAB, no wheeze  Cardiovascular: RRR  GI: soft, nontender, non distended, normal bowel sounds  Skin: no jaundice, no " rash      Medical Decision Making       25 MINUTES SPENT BY ME on the date of service doing chart review, history, exam, documentation & further activities per the note.      Data         Imaging results reviewed over the past 24 hrs:   No results found for this or any previous visit (from the past 24 hour(s)).

## 2023-10-29 NOTE — PROGRESS NOTES
[facility]    Medicine Progress Note - Hospitalist Service    Date of Admission:  9/29/2023    Assessment & Plan   Rosemary Farmer is a 88-year-old with severe dementia with behavioral disturbance who lives in a senior memory care unit at [place] was brought in due to recurrent falls.  He sustained acute on chronic C7 and acute on chronic T3 fracture. Neurosurgery was consulted and recommended conservative treatment.       10/29: last 1:1 since 10/25 now discontinued (most recent prior to this was 10/20), last dose of IM medication was olanzapine 10/19     Acute/subacute C7 and T3 fracture   Chronic T1 fracture  Mechanical fall, initial encounter  - CT of head did not show any acute abnormality.   -MRI cervical spine 9/29: superior endplate compression fracture of the C7 vertebrae with loss of 15% of vertebral body height. Subtle high signal change within the anterior/superior margin of the C7 vertebrae suggests that this fracture is acute/subacute in nature. chronic compression fracture of the T1 vertebrae, acute/subacute T3 vertebrae   -Spine surgery consulted, recommend conservative management, cervical collar x 6-12 weeks time considering  is acute to subacute with follow up in 6 weeks time.       Essential Hypertension with am hypotenison  - Norvasc at 2.5 mg po daily due to am hypotension, with holding parameters     Acute encephalopathy  Lewy body dementia with parkinsonism  - Baseline dementia with behavioral disturbance, has confusion and anxiety at baseline  - worsened Hyperactive delirium after admission which has now resolved  - On seroquel 12.5 mg bid, 50 mg at bedtime, 25 mg bid prn for agitation; ramelteon 8 mg at bedtime, trazodone 100 mg at bedtime   - High-dose Sinemet was DC'd on 9/30.  Patient had agitation on  of Sinemet.  As per his geriatrician plan we will continue to taper off Sinemet.  Currently on 5-50 BID, will switch to 5-50 daily on 11/2 for 14 days then  stop  - IM olanzapine 2.5 mg twice daily as needed for agitation available. Last dose given 10/19  -Psychiatry consult appreciated     Neuropathy   - On lyrica 50 mg bid     History of a fib  - S/p pacemaker  - No anticoagulation because of fall     Weakness and deconditioning  - S/p recurrent falls  - Patient needs long-term care placement   -TCU refused due to agitation, encephalopathy and dementia.   -SW for long term memory care placement          Diet: Regular Diet Adult    DVT Prophylaxis: Pneumatic Compression Devices  Manzo Catheter: Not present  Lines: None     Cardiac Monitoring: None  Code Status: No CPR- Do NOT Intubate      Clinically Significant Risk Factors                      # Dementia: noted on problem list     # Moderate Malnutrition: based on nutrition assessment           Disposition Plan      Expected Discharge Date: 10/31/2023    Discharge Delays: Complex Discharge  Placement - LTC  Specialist Consult (enter specialist & decision needed in comments)  Other (Add Comment)    Discharge Comments: pending placement LT  Refused by TCU            Arpita La MD  Hospitalist Service  [facility]  Securely message with [employer] (more info)  Text page via [employer] Paging/Directory   ______________________________________________________________________    Interval History   Patient was sitting comfortably in the chair with cervical collar, denies any complaints.    Physical Exam   Vital Signs: Temp: 98.1  F (36.7  C) Temp src: Oral BP: 121/67 Pulse: 81   Resp: 16 SpO2: 97 % O2 Device: None (Room air)    Weight: 147 lbs 11.33 oz    General Appearance:  Awake, alert, in no acute distress  Respiratory: CTAB, no wheeze  Cardiovascular: RRR  GI: soft, nontender, non distended, normal bowel sounds  Skin: no jaundice, no rash    Medical Decision Making       25 MINUTES SPENT BY ME on the date of service doing chart review, history, exam, documentation & further activities per the  note.      Data         Imaging results reviewed over the past 24 hrs:   No results found for this or any previous visit (from the past 24 hour(s)).

## 2023-10-29 NOTE — PLAN OF CARE
Problem: Plan of Care - These are the overarching goals to be used throughout the patient stay.    Goal: Plan of Care Review  Description: The Plan of Care Review/Shift note should be completed every shift.  The Outcome Evaluation is a brief statement about your assessment that the patient is improving, declining, or no change.  This information will be displayed automatically on your shift note.  Outcome: Progressing     Problem: Behavioral Health Comorbidity  Goal: Maintenance of Behavioral Health Symptom Control  Outcome: Progressing     Problem: Confusion Chronic  Goal: Optimal Cognitive Function  Outcome: Progressing   Goal Outcome Evaluation:       Pt alert & oriented to self. Calm and cooperative with cares. Attempted to get out of bed to pee, offered urinal and follows command. Went back to sleep. On room air, Vital Signs stable. No signs of pain or discomfort. Will continue to monitor.

## 2023-10-29 NOTE — PLAN OF CARE
"  Problem: Plan of Care - These are the overarching goals to be used throughout the patient stay.    Goal: Patient-Specific Goal (Individualized)  Description: You can add care plan individualizations to a care plan. Examples of Individualization might be:  \"Parent requests to be called daily at 9am for status\", \"I have a hard time hearing out of my right ear\", or \"Do not touch me to wake me up as it startles me\".  Outcome: Progressing     Problem: Plan of Care - These are the overarching goals to be used throughout the patient stay.    Goal: Absence of Hospital-Acquired Illness or Injury  Intervention: Identify and Manage Fall Risk  Recent Flowsheet Documentation  Taken 10/29/2023 1200 by Ezio Chakraborty, RN  Safety Promotion/Fall Prevention: activity supervised     Problem: Orthopaedic Fracture  Goal: Optimal Functional Ability  Intervention: Optimize Functional Ability  Recent Flowsheet Documentation  Taken 10/29/2023 1200 by Ezio Chakraborty, RN  Activity Management: back to bed   Goal Outcome Evaluation:  No verbal or nonverbal expressions of pain, cooperative with cares and medication, VS WDL.                      "

## 2023-10-29 NOTE — PLAN OF CARE
Problem: Plan of Care - These are the overarching goals to be used throughout the patient stay.    Goal: Absence of Hospital-Acquired Illness or Injury  Outcome: Progressing     Problem: Plan of Care - These are the overarching goals to be used throughout the patient stay.    Goal: Optimal Comfort and Wellbeing  Outcome: Progressing     Problem: Behavioral Health Comorbidity  Goal: Maintenance of Behavioral Health Symptom Control  Outcome: Progressing     Problem: Orthopaedic Fracture  Goal: Absence of Bleeding  Outcome: Progressing  Goal: Effective Bowel Elimination  Outcome: Progressing  Goal: Absence of Embolism Signs and Symptoms  Outcome: Progressing  Goal: Fracture Stability  Outcome: Progressing  Goal: Optimal Functional Ability  Outcome: Progressing  Goal: Absence of Infection Signs and Symptoms  Outcome: Progressing  Goal: Effective Tissue Perfusion  Outcome: Progressing  Goal: Optimal Pain Control and Function  Outcome: Progressing  Goal: Effective Oxygenation and Ventilation  Outcome: Progressing     Problem: Risk for Delirium  Goal: Optimal Coping  Outcome: Progressing  Goal: Improved Behavioral Control  Outcome: Progressing  Goal: Improved Attention and Thought Clarity  Outcome: Progressing  Goal: Improved Sleep  Outcome: Progressing     Problem: Confusion Chronic  Goal: Optimal Cognitive Function  Outcome: Progressing     Problem: Pain Acute  Goal: Optimal Pain Control and Function  Outcome: Progressing     Pt mostly lethargic this shift; arouses to voice. Pt did become restless at about 1920, but was able to be redirected. Pt calmed down and was given PRN PO olanzapine, which was effective. Pt did wake up and agreed to take all HS medications in chocolate ice cream. Fluids accepted when offered. Incontinent cares provided, which pt accepted after re-direction and re-assurance.

## 2023-10-30 NOTE — PLAN OF CARE
Problem: Plan of Care - These are the overarching goals to be used throughout the patient stay.    Goal: Absence of Hospital-Acquired Illness or Injury  Outcome: Progressing     Problem: Plan of Care - These are the overarching goals to be used throughout the patient stay.    Goal: Optimal Comfort and Wellbeing  Outcome: Progressing     Problem: Behavioral Health Comorbidity  Goal: Maintenance of Behavioral Health Symptom Control  Outcome: Progressing     Problem: Orthopaedic Fracture  Goal: Absence of Bleeding  Outcome: Progressing  Goal: Effective Bowel Elimination  Outcome: Progressing  Goal: Absence of Embolism Signs and Symptoms  Outcome: Progressing  Goal: Fracture Stability  Outcome: Progressing  Goal: Optimal Functional Ability  Outcome: Progressing  Goal: Absence of Infection Signs and Symptoms  Outcome: Progressing  Goal: Effective Tissue Perfusion  Outcome: Progressing  Goal: Optimal Pain Control and Function  Outcome: Progressing  Goal: Effective Oxygenation and Ventilation  Outcome: Progressing     Problem: Risk for Delirium  Goal: Optimal Coping  Outcome: Progressing  Goal: Improved Behavioral Control  Outcome: Progressing  Goal: Improved Attention and Thought Clarity  Outcome: Progressing  Goal: Improved Sleep  Outcome: Progressing     Problem: Confusion Chronic  Goal: Optimal Cognitive Function  Outcome: Progressing     Problem: Pain Acute  Goal: Optimal Pain Control and Function  Outcome: Progressing     No non-verbal signs of pain observed thus far. PRN quetiapine and PRN olanzapine PO administered for pt restlessness. Incontinent cares provided. Pt ate 100% of his dinner by himself. Meds given whole in chocolate ice cream. PO fluids offered w/ med administration and when visual checks were performed.

## 2023-10-30 NOTE — PROGRESS NOTES
[facility]    Medicine Progress Note - Hospitalist Service    Date of Admission:  9/29/2023    Assessment & Plan   Rosemary Farmer is a 88-year-old with severe dementia with behavioral disturbance who lives in a senior memory care unit at [place] was brought in due to recurrent falls.  He sustained acute on chronic C7 and acute on chronic T3 fracture. Neurosurgery was consulted and recommended conservative treatment.       10/30: last 1:1 since 10/25 now discontinued (most recent prior to this was 10/20), last dose of IM medication was olanzapine 10/19     Acute/subacute C7 and T3 fracture   Chronic T1 fracture  Mechanical fall, initial encounter  - CT of head did not show any acute abnormality.   -MRI cervical spine 9/29: superior endplate compression fracture of the C7 vertebrae with loss of 15% of vertebral body height. Subtle high signal change within the anterior/superior margin of the C7 vertebrae suggests that this fracture is acute/subacute in nature. chronic compression fracture of the T1 vertebrae, acute/subacute T3 vertebrae   -Spine surgery consulted, recommend conservative management, cervical collar x 6-12 weeks time considering  is acute to subacute with follow up in 6 weeks time.       Essential Hypertension with am hypotenison  - Norvasc at 2.5 mg po daily due to am hypotension, with holding parameters     Acute encephalopathy  Lewy body dementia with parkinsonism  - Baseline dementia with behavioral disturbance, has confusion and anxiety at baseline  - worsened Hyperactive delirium after admission which has now resolved  - On seroquel 12.5 mg bid, 50 mg at bedtime, 25 mg bid prn for agitation; ramelteon 8 mg at bedtime, trazodone 100 mg at bedtime   - High-dose Sinemet was DC'd on 9/30.  Patient had agitation on  of Sinemet.  As per his geriatrician plan we will continue to taper off Sinemet.  Currently on 5-50 BID, will switch to 5-50 daily on 11/2 for 14 days then  stop  - IM olanzapine 2.5 mg twice daily as needed for agitation available. Last dose given 10/19  -Psychiatry consult appreciated     Neuropathy   - On lyrica 50 mg bid     History of a fib  - S/p pacemaker  - No anticoagulation because of fall     Weakness and deconditioning  - S/p recurrent falls  - Patient needs long-term care placement   -TCU refused due to agitation, encephalopathy and dementia.   -SW for long term memory care placement          Diet: Regular Diet Adult  Room Service    DVT Prophylaxis: Pneumatic Compression Devices  Manzo Catheter: Not present  Lines: None     Cardiac Monitoring: None  Code Status: No CPR- Do NOT Intubate      Clinically Significant Risk Factors                      # Dementia: noted on problem list     # Moderate Malnutrition: based on nutrition assessment           Disposition Plan      Expected Discharge Date: 10/31/2023    Discharge Delays: Complex Discharge  Placement - LTC  Specialist Consult (enter specialist & decision needed in comments)  Other (Add Comment)    Discharge Comments: pending placement LT  Refused by TCU            Arpita La MD  Hospitalist Service  [facility]  Securely message with [employer] (more info)  Text page via [employer] Paging/Directory   ______________________________________________________________________    Interval History   Patient is sitting in the chair, denies any complaints    Physical Exam   Vital Signs: Temp: 98.2  F (36.8  C) Temp src: Oral BP: (!) 142/58 Pulse: 64   Resp: 18 SpO2: 100 % O2 Device: None (Room air)    Weight: 147 lbs 11.33 oz    General Appearance:  Awake, alert, in no acute distress  Respiratory: CTAB, no wheeze  Cardiovascular: RRR  GI: soft, nontender, non distended, normal bowel sounds  Skin: no jaundice, no rash    Medical Decision Making       20 MINUTES SPENT BY ME on the date of service doing chart review, history, exam, documentation & further activities per the note.      Data          Imaging results reviewed over the past 24 hrs:   No results found for this or any previous visit (from the past 24 hour(s)).

## 2023-10-30 NOTE — PROGRESS NOTES
CLINICAL NUTRITION SERVICES -BRIEF NOTE     Nutrition Prescription    RECOMMENDATIONS FOR MDs/PROVIDERS TO ORDER:  None    Malnutrition Status:    Moderate malnutrition In Context of: Acute on Chronic illness or disease    Recommendations already ordered by Registered Dietitian (RD):  Change to generated (automatic) sent tray - Additional food can be ordered if pt is hungry. Will aim for finger foods to ease restlessness    Future/Additional Recommendations:  Add supplement back if intake becomes inadequate     EVALUATION OF THE PROGRESS TOWARD GOALS   Diet: Regular    Intake: Good    Pt eating 100% of 2-3 meal/day  With variable orderin0576-5304 kcal, g protein/day  Usually 3 meal/day being ordered. Did miss 1 meal one day     Nsg reports pt always seems hungry and will eat everything given to him    Meeting usually >75% ->100% of estimated nutrition needs     NEW FINDINGS   -Pt awaiting placement.     Weight History: Clinically significant wt loss of 20.6% weight loss x 10 months, no recent losses   Last weight taken 10/13 - 2 x weekly weights ordered  GI: 1-2 soft Bm/day  Edema: None per nsg  Labs: No recent  Meds: mvi with minerals, pericolace bid. Miralax stopped    Dosing Weight: 65.9 kg     ASSESSED NUTRITION NEEDS  Estimated Energy Needs: 8078-0182 kcals/day (30 - 35 kcals/kg )  Justification: Underweight  Estimated Protein Needs: 79-99 grams protein/day (1.2 - 1.5 grams of pro/kg)  Justification: Increased needs  Estimated Fluid Needs: 9697-5601 mL/day (25 - 30 mL/kg)   Justification: Maintenance      MALNUTRITION:   % Weight Loss:  > 20% in 1 year (severe malnutrition)  % Intake:  No decreased intake noted  Subcutaneous Fat Loss:  Orbital region moderate-severe depletion  Muscle Loss:  Temporal region moderate depletion, Clavicle bone region severe depletion, and Acromion bone region moderate depletion  Fluid Retention:  Trace    Malnutrition Diagnosis: Moderate malnutrition  In Context of:  Acute  on Chronic illness or disease- Improving     INTERVENTIONS  Implementation  Nsg is ordering meals for pt. They are ok with changing to generated tray to improve consistency of amount ordered- They like to order finger foods to keep pt busy as he is restless      Monitoring/Evaluation  Progress toward goals will be monitored and evaluated per protocol.

## 2023-10-30 NOTE — PLAN OF CARE
Problem: Plan of Care - These are the overarching goals to be used throughout the patient stay.    Goal: Plan of Care Review  Description: The Plan of Care Review/Shift note should be completed every shift.  The Outcome Evaluation is a brief statement about your assessment that the patient is improving, declining, or no change.  This information will be displayed automatically on your shift note.  Outcome: Progressing     Problem: Behavioral Health Comorbidity  Goal: Maintenance of Behavioral Health Symptom Control  Outcome: Progressing     Problem: Confusion Chronic  Goal: Optimal Cognitive Function  Outcome: Progressing  Intervention: Minimize and Manage Confusion  Recent Flowsheet Documentation  Taken 10/30/2023 0100 by Owen Ruiz RN  Sensory Stimulation Regulation: care clustered   Goal Outcome Evaluation:       Pt alert to self. Confused. Disoriented to time, place and situation. Multiple times attempted to get up from bed. Reorientation provided. Follows command. Incontinence cares done. Cervical collar at all times. Will continue to monitor.

## 2023-10-30 NOTE — PLAN OF CARE
Problem: Plan of Care - These are the overarching goals to be used throughout the patient stay.    Goal: Optimal Comfort and Wellbeing  Outcome: Progressing  Intervention: Provide Person-Centered Care  Recent Flowsheet Documentation  Taken 10/30/2023 0900 by Anitha Loving RN  Trust Relationship/Rapport:   care explained   reassurance provided     Problem: Plan of Care - These are the overarching goals to be used throughout the patient stay.    Goal: Optimal Comfort and Wellbeing  Intervention: Provide Person-Centered Care  Recent Flowsheet Documentation  Taken 10/30/2023 0900 by Anitha Loving RN  Trust Relationship/Rapport:   care explained   reassurance provided     Problem: Orthopaedic Fracture  Goal: Optimal Pain Control and Function  Outcome: Progressing     Problem: Confusion Chronic  Goal: Optimal Cognitive Function  Intervention: Minimize Injury Risk and Provide Safety  Recent Flowsheet Documentation  Taken 10/30/2023 0900 by Anitha Loving RN  Enhanced Safety Measures: room near unit station  Intervention: Minimize and Manage Confusion  Recent Flowsheet Documentation  Taken 10/30/2023 0900 by Anitha Loving RN  Sensory Stimulation Regulation:   quiet environment promoted   care clustered  Reorientation Measures: clock in view  Environmental Support:   calm environment promoted   distractions minimized   rest periods encouraged  Environment Familiarity/Consistency: daily routine followed   Goal Outcome Evaluation:    Patient fidgeting with clothes and socks this am. Needed multiple re-direction for taking clothes off and setting off chair alarm. Collar on and patient denies pain. Appetite great. Ate 100% of breakfast. Incontinent of urine.

## 2023-10-31 NOTE — PROGRESS NOTES
Care Management Follow Up    Length of Stay (days): 30    Expected Discharge Date: 10/31/2023     Concerns to be Addressed:       Patient plan of care discussed at interdisciplinary rounds: Yes    Anticipated Discharge Disposition:       Anticipated Discharge Services:    Anticipated Discharge DME:      Patient/family educated on Medicare website which has current facility and service quality ratings:    Education Provided on the Discharge Plan:    Patient/Family in Agreement with the Plan:      Referrals Placed by CM/SW:    Private pay costs discussed: Not applicable    Additional Information:  Chart reviewed.  ELEN sent additional notes to Enriqueta at [facility].  Nursing notes faxed to [phone] per facility request. ELEN received call from Lacy -  at [facility] requesting additional notes as well. Sent to EFAX: [phone].  ELEN asked staff if they are able to visit hospital to complete an inperson assessment. They state they are reviewing current notes at this time.    1:34 PM  Nursing staff with [facility] visited hospital today. Per charge nurse, pt was exhibiting behaviors during this time. Enriqueta from [facility] called back and declined patient due to ongoing behaviors. Wife updated per Enriqueta.    ELEN called [facility] - a smaller residential memory care setting. [phone] to inquire about availability.     Referral sent to [facility] - bed available in [facility]   Phone: [phone] Fax: [phone]    ANDREAS Nassar

## 2023-10-31 NOTE — PLAN OF CARE
"Goal Outcome Evaluation:      Plan of Care Reviewed With: patient    Overall Patient Progress: improvingOverall Patient Progress: improving    Outcome Evaluation: Alert, oriented to self.  Pleasant, calm, cooperative for most of evening shift.  Follows most direction except for rolling in bed and standing.  Around 2140 patient became very restless and trying to get out of bed (\"to go to work\" or \"to go to bed\"), sometimes trying to take off color.  At times frustrated with staff for interfering and preventing from getting up.  Able to briefly redirect with food, but continuously returning to trying to get up, removing clothes, etc.  Oral Zyprexa 2.5 mg given at 2155.  Patient restless until about 2310.  Aspen collar pads washed.      Problem: Orthopaedic Fracture  Goal: Absence of Bleeding  Outcome: Progressing     Problem: Orthopaedic Fracture  Goal: Optimal Functional Ability  Intervention: Optimize Functional Ability  Recent Flowsheet Documentation  Taken 10/30/2023 1540 by Gricel Pierce, RN  Activity Management: activity adjusted per tolerance  Positioning/Transfer Devices:   in use   pillows     "

## 2023-10-31 NOTE — PROGRESS NOTES
[facility]    Medicine Progress Note - Hospitalist Service    Date of Admission:  9/29/2023    Assessment & Plan   Rosemary Farmer is a 88-year-old with severe dementia with behavioral disturbance who lives in a senior memory care unit at [place] was brought in due to recurrent falls.  He sustained acute on chronic C7 and acute on chronic T3 fracture. Neurosurgery was consulted and recommended conservative treatment.       10/31: last 1:1 since 10/25 now discontinued (most recent prior to this was 10/20), last dose of IM medication was olanzapine 10/19  Required oral Zyprexa on 10/30 for agitation     Acute/subacute C7 and T3 fracture   Chronic T1 fracture  Mechanical fall, initial encounter  - CT of head did not show any acute abnormality.   -MRI cervical spine 9/29: superior endplate compression fracture of the C7 vertebrae with loss of 15% of vertebral body height. Subtle high signal change within the anterior/superior margin of the C7 vertebrae suggests that this fracture is acute/subacute in nature. chronic compression fracture of the T1 vertebrae, acute/subacute T3 vertebrae   -Spine surgery consulted, recommend conservative management, cervical collar x 6-12 weeks time considering  is acute to subacute with follow up in 6 weeks time.       Essential Hypertension with am hypotenison  - Norvasc at 2.5 mg po daily due to am hypotension, with holding parameters  Blood Pressure elevated today, will increase amlodipine if persistently high     Acute encephalopathy  Lewy body dementia with parkinsonism  - Baseline dementia with behavioral disturbance, has confusion and anxiety at baseline  - worsened Hyperactive delirium after admission which has now resolved  - On seroquel 12.5 mg bid, 50 mg at bedtime, 25 mg bid prn for agitation; ramelteon 8 mg at bedtime, trazodone 100 mg at bedtime   - High-dose Sinemet was DC'd on 9/30.  Patient had agitation on  of Sinemet.  As per his  geriatrician plan we will continue to taper off Sinemet.  Currently on 5-50 BID, will switch to 5-50 daily on 11/2 for 14 days then stop  - IM olanzapine 2.5 mg twice daily as needed for agitation available. Last dose given 10/19  -Psychiatry consult appreciated     Neuropathy   - On lyrica 50 mg bid     History of a fib  - S/p pacemaker  - No anticoagulation because of fall     Weakness and deconditioning  - S/p recurrent falls  - Patient needs long-term care placement   -TCU refused due to agitation, encephalopathy and dementia.   -SW for long term memory care placement          Diet: Regular Diet Adult  Room Service    DVT Prophylaxis: Pneumatic Compression Devices  Manzo Catheter: Not present  Lines: None     Cardiac Monitoring: None  Code Status: No CPR- Do NOT Intubate      Clinically Significant Risk Factors                      # Dementia: noted on problem list     # Moderate Malnutrition: based on nutrition assessment           Disposition Plan      Expected Discharge Date: 11/01/2023    Discharge Delays: Complex Discharge  Placement - LTC  Specialist Consult (enter specialist & decision needed in comments)  Other (Add Comment)    Discharge Comments: pending placement [facility]  Refused by TCU            Arpita La MD  Hospitalist Service  [facility]  Securely message with [employer] (more info)  Text page via [employer] Paging/Directory   ______________________________________________________________________    Interval History   Patient was agitated and restless overnight, even to oral Zyprexa 2.5 mg  Not required IM Zyprexa, nor 1:1    Physical Exam   Vital Signs: Temp: 98  F (36.7  C) Temp src: Oral BP: (!) 174/83 Pulse: 87   Resp: 16 SpO2: 96 % O2 Device: None (Room air)    Weight: 147 lbs 11.33 oz    General Appearance:  Awake, alert, in no acute distress  Respiratory: CTAB, no wheeze  Cardiovascular: RRR  GI: soft, nontender, non distended, normal bowel sounds  Skin: no jaundice, no  rash    Medical Decision Making       25 MINUTES SPENT BY ME on the date of service doing chart review, history, exam, documentation & further activities per the note.      Data         Imaging results reviewed over the past 24 hrs:   No results found for this or any previous visit (from the past 24 hour(s)).

## 2023-11-01 NOTE — PLAN OF CARE
Goal Outcome Evaluation: Pt was restless for most of the shift, wanting to get out of bed. Pt got scheduled seroquel and later PRN olanzipine at approx 10:30pm. PAINAID score 0, pain ruled out.    Problem: Plan of Care - These are the overarching goals to be used throughout the patient stay.    Goal: Plan of Care Review  Description: The Plan of Care Review/Shift note should be completed every shift.  The Outcome Evaluation is a brief statement about your assessment that the patient is improving, declining, or no change.  This information will be displayed automatically on your shift note.  Outcome: Progressing  Flowsheets (Taken 10/31/2023 4334)  Plan of Care Reviewed With: patient       Problem: Plan of Care - These are the overarching goals to be used throughout the patient stay.    Goal: Absence of Hospital-Acquired Illness or Injury  Intervention: Identify and Manage Fall Risk  Recent Flowsheet Documentation  Taken 10/31/2023 1553 by Adegun, Oluwadamilola, RN  Safety Promotion/Fall Prevention:   activity supervised   room near nurse's station     Problem: Confusion Chronic  Goal: Optimal Cognitive Function  Intervention: Minimize Injury Risk and Provide Safety  Recent Flowsheet Documentation  Taken 10/31/2023 1553 by Adegun, Oluwadamilola, RN  Enhanced Safety Measures: room near unit station          Plan of Care Reviewed With: patient

## 2023-11-01 NOTE — PROGRESS NOTES
[facility]    Medicine Progress Note - Hospitalist Service    Date of Admission:  9/29/2023    Assessment & Plan   Rosemary Farmer is a 88-year-old with severe dementia with behavioral disturbance who lives in a senior memory care unit at [place] was brought in due to recurrent falls.  He sustained acute on chronic C7 and acute on chronic T3 fracture. Neurosurgery was consulted and recommended conservative treatment.       11/01: last 1:1 since 10/25 now discontinued (most recent prior to this was 10/20), last dose of IM medication was olanzapine 10/19  Required oral Zyprexa on 10/30 for agitation     Acute/subacute C7 and T3 fracture   Chronic T1 fracture  Mechanical fall, initial encounter  - CT of head did not show any acute abnormality.   -MRI cervical spine 9/29: superior endplate compression fracture of the C7 vertebrae with loss of 15% of vertebral body height. Subtle high signal change within the anterior/superior margin of the C7 vertebrae suggests that this fracture is acute/subacute in nature. chronic compression fracture of the T1 vertebrae, acute/subacute T3 vertebrae   -Spine surgery consulted, recommend conservative management, cervical collar x 6-12 weeks time considering  is acute to subacute with follow up in 6 weeks time.       Essential Hypertension with am hypotenison  - Norvasc at 2.5 mg po daily due to am hypotension, with holding parameters  Blood Pressure elevated today, will increase amlodipine if persistently high     Acute encephalopathy  Lewy body dementia with parkinsonism  - Baseline dementia with behavioral disturbance, has confusion and anxiety at baseline  - worsened Hyperactive delirium after admission which has now resolved  - On seroquel 12.5 mg bid, 50 mg at bedtime, 25 mg bid prn for agitation; ramelteon 8 mg at bedtime, trazodone 100 mg at bedtime   - High-dose Sinemet was DC'd on 9/30.  Patient had agitation on  of Sinemet.  As per his  geriatrician plan we will continue to taper off Sinemet.  Currently on 5-50 BID, will switch to 5-50 daily on 11/2 for 14 days then stop  - IM olanzapine 2.5 mg twice daily as needed for agitation available. Last dose given 10/19  -Psychiatry consult appreciated     Neuropathy   - On lyrica 50 mg bid     History of a fib  - S/p pacemaker  - No anticoagulation because of fall     Weakness and deconditioning  - S/p recurrent falls  - Patient needs long-term care placement   -TCU refused due to agitation, encephalopathy and dementia.   -SW for long term memory care placement          Diet: Regular Diet Adult  Room Service    DVT Prophylaxis: Pneumatic Compression Devices  Manzo Catheter: Not present  Lines: None     Cardiac Monitoring: None  Code Status: No CPR- Do NOT Intubate      Clinically Significant Risk Factors                      # Dementia: noted on problem list     # Moderate Malnutrition: based on nutrition assessment           Disposition Plan      Expected Discharge Date: 11/03/2023    Discharge Delays: Complex Discharge  Placement - LTC  *Medically Ready for Discharge    Discharge Comments: pending placement [facility]  Refused by TCU            Arpita La MD  Hospitalist Service  [facility]  Securely message with [employer] (more info)  Text page via [employer] Paging/Directory   ______________________________________________________________________    Interval History   Patient is sitting on the chair at the bedside, having lunch, denies any complaints    Physical Exam   Vital Signs: Temp: 96.9  F (36.1  C) Temp src: Axillary BP: 97/56 Pulse: 80   Resp: 20 SpO2: 95 % O2 Device: None (Room air)    Weight: 147 lbs 11.33 oz    General Appearance:  Awake, alert, in no acute distress  Respiratory: CTAB, no wheeze  Cardiovascular: RRR  GI: soft, nontender, non distended, normal bowel sounds  Skin: no jaundice, no rash    Medical Decision Making       20 MINUTES SPENT BY ME on the date of  service doing chart review, history, exam, documentation & further activities per the note.      Data         Imaging results reviewed over the past 24 hrs:   No results found for this or any previous visit (from the past 24 hour(s)).

## 2023-11-01 NOTE — PLAN OF CARE
Problem: Plan of Care - These are the overarching goals to be used throughout the patient stay.    Goal: Plan of Care Review  Description: The Plan of Care Review/Shift note should be completed every shift.  The Outcome Evaluation is a brief statement about your assessment that the patient is improving, declining, or no change.  This information will be displayed automatically on your shift note.  Outcome: Progressing  Flowsheets (Taken 11/1/2023 0609)  Plan of Care Reviewed With: patient     Problem: Risk for Delirium  Goal: Improved Behavioral Control  Intervention: Minimize Safety Risk  Recent Flowsheet Documentation  Taken 11/1/2023 0100 by Zina De Paz RN  Communication Enhancement Strategies: one-step directions provided     Problem: Pain Acute  Goal: Optimal Pain Control and Function  Intervention: Prevent or Manage Pain  Recent Flowsheet Documentation  Taken 11/1/2023 0100 by Zina De Paz RN  Sensory Stimulation Regulation: care clustered     Problem: Violence Risk or Actual  Goal: Anger and Impulse Control  Intervention: Minimize Safety Risk  Recent Flowsheet Documentation  Taken 11/1/2023 0100 by Zina De Paz RN  Sensory Stimulation Regulation: care clustered   Goal Outcome Evaluation:   Patient had a pain score of 2, he was restless at the start of shift. Prn Seroquel was utilized and patient eventually slept.     Plan of Care Reviewed With: patient

## 2023-11-01 NOTE — PLAN OF CARE
Problem: Behavioral Health Comorbidity  Goal: Maintenance of Behavioral Health Symptom Control  Outcome: Progressing  Intervention: Maintain Behavioral Health Symptom Control  Recent Flowsheet Documentation  Taken 11/1/2023 1130 by Radha Cisneros, RN  Medication Review/Management: medications reviewed   Goal Outcome Evaluation:  Pt slept until after 1100 today. Up to chair with assist, tray set up. Pt slept on and off in afternoon. Alarms in place. Fidgety with gown, followed commands. Cooperated with PT. No c/o pain, MJ collar in place. Reoriented as needed.

## 2023-11-01 NOTE — PROGRESS NOTES
Received voicemail from Logan Munoz  requesting call back to discuss discharge planning [phone].     Called her back. No answer. Message left for Tammy with my call back number    12:01 PM  Spoke to Tammy, she will look into other options and call CM back with any ideas for placement

## 2023-11-02 NOTE — PLAN OF CARE
Problem: Plan of Care - These are the overarching goals to be used throughout the patient stay.    Goal: Absence of Hospital-Acquired Illness or Injury  Intervention: Prevent Skin Injury  Recent Flowsheet Documentation  Taken 11/1/2023 1700 by Mira Lopez RN  Body Position: position changed independently     Problem: Plan of Care - These are the overarching goals to be used throughout the patient stay.    Goal: Optimal Comfort and Wellbeing  Outcome: Progressing  Intervention: Provide Person-Centered Care  Recent Flowsheet Documentation  Taken 11/1/2023 1700 by Mira Lopez RN  Trust Relationship/Rapport:   care explained   choices provided    Up to chair for supper, ate sandwich cut into quarters.  Staff fed melon to patient, and he ate cookies his family had brought in.  Took meds crushed in chocolate pudding.   Very mobile in his bed but cooperative most of this shift.  Likes having people in his room with him, calls out to them as he sees them pass in the hallway.    Mira Lopez RN

## 2023-11-02 NOTE — PROGRESS NOTES
[facility]    PROGRESS NOTE - Hospitalist Service    Assessment and Plan  Rosemary Farmer is a 88-year-old with severe dementia with behavioral disturbance who lives in a senior memory care unit at [place] was brought in due to recurrent falls.  He sustained acute on chronic C7 and acute on chronic T3 fracture. Neurosurgery was consulted and recommended conservative treatment.       11/01: last 1:1 since 10/25 now discontinued (most recent prior to this was 10/20), last dose of IM medication was olanzapine 10/19  Required oral Zyprexa on 10/30 for agitation     Acute/subacute C7 and T3 fracture   Chronic T1 fracture  Mechanical fall, initial encounter  - CT of head did not show any acute abnormality.   -MRI cervical spine 9/29: superior endplate compression fracture of the C7 vertebrae with loss of 15% of vertebral body height. Subtle high signal change within the anterior/superior margin of the C7 vertebrae suggests that this fracture is acute/subacute in nature. chronic compression fracture of the T1 vertebrae, acute/subacute T3 vertebrae   -Spine surgery consulted, recommend conservative management, cervical collar x 6-12 weeks time considering  is acute to subacute with follow up in 6 weeks time.       Essential Hypertension with am hypotenison  - Norvasc at 2.5 mg po daily due to am hypotension, with holding parameters  Blood Pressure elevated today, will increase amlodipine if persistently high     Acute encephalopathy  Lewy body dementia with parkinsonism  - Baseline dementia with behavioral disturbance, has confusion and anxiety at baseline  - worsened Hyperactive delirium after admission which has now resolved  - On seroquel 12.5 mg bid, 50 mg at bedtime, 25 mg bid prn for agitation; ramelteon 8 mg at bedtime, trazodone 100 mg at bedtime   - Increase Seroquel to 25 mg as patient still has agitation episodes  - High-dose Sinemet was DC'd on 9/30.  Patient had agitation on  of  Sinemet.  As per his geriatrician plan we will continue to taper off Sinemet.  Currently on 5-50 BID, will switch to 5-50 daily on 11/2 for 14 days then stop  - IM olanzapine 2.5 mg twice daily as needed for agitation available. Last dose given 10/19  -Psychiatry consult appreciated     Neuropathy   - On lyrica 50 mg bid     History of a fib  - S/p pacemaker  - No anticoagulation because of fall     Weakness and deconditioning  - S/p recurrent falls  - Patient needs long-term care placement   -TCU refused due to agitation, encephalopathy and dementia.   - for long term memory care placement    35 MINUTES SPENT BY ME on the date of service doing chart review, history, exam, documentation & further activities per the note    Principal Problem:    Agitation  Active Problems:    Other closed nondisplaced fracture of seventh cervical vertebra, initial encounter (H)    Closed nondisplaced fracture of seventh cervical vertebra, unspecified fracture morphology, initial encounter (H)      VTE prophylaxis:  Pneumatic Compression Devices  DIET: Orders Placed This Encounter      Regular Diet Adult      Disposition/Barriers to discharge: Placement  Code Status: No CPR- Do NOT Intubate    Subjective:  Rosemary is pleasant confused, had restless episode early this morning, no acute significant events overnight    PHYSICAL EXAM  Vitals:    10/09/23 1936 10/12/23 1300 10/13/23 0500   Weight: 65.9 kg (145 lb 4.5 oz) 63.9 kg (140 lb 14 oz) 67 kg (147 lb 11.3 oz)     B/P:124/52 T:98.5 P:71 R:18     Intake/Output Summary (Last 24 hours) at 11/2/2023 1215  Last data filed at 11/2/2023 1000  Gross per 24 hour   Intake 480 ml   Output --   Net 480 ml      Body mass index is 17.98 kg/m .    Constitutional: awake, alert, cooperative, no apparent distress, and appears stated age  Respiratory: No increased work of breathing, good air exchange, clear to auscultation bilaterally, no crackles or wheezing  Cardiovascular: Normal apical impulse,  regular rate and rhythm, normal S1 and S2, no S3 or S4, and no murmur noted  GI: No scars, normal bowel sounds, soft, non-distended, non-tender, no masses palpated, no hepatosplenomegally  Skin: no bruising or bleeding and normal skin color, texture, turgor  Musculoskeletal: There is no redness, warmth, or swelling of the joints.  Full range of motion noted.  no lower extremity pitting edema present  Neurologic: Sleepy but respond to verbal stimuli, self oriented only.  Cranial nerves II-XII are grossly intact.  Motor is 5 out of 5 bilaterally.   Sensory is intact.    Neuropsychiatric: Appropriate with examiner      PERTINENT LABS/IMAGING:        Imaging results reviewed over the past 24 hrs:   No results found for this or any previous visit (from the past 24 hour(s)).    Discussed with patient, nursing staff and discharge planner    Brett Williamson MD  [facility] Medicine Service  [phone]

## 2023-11-02 NOTE — PLAN OF CARE
Goal Outcome Evaluation:           Problem: Behavioral Health Comorbidity  Goal: Maintenance of Behavioral Health Symptom Control  Outcome: Progressing  Intervention: Maintain Behavioral Health Symptom Control  Recent Flowsheet Documentation  Taken 11/2/2023 0900 by Radha Cisneros RN  Medication Review/Management: medications reviewed     Problem: Plan of Care - These are the overarching goals to be used throughout the patient stay.    Goal: Optimal Comfort and Wellbeing  Outcome: Progressing     Problem: Plan of Care - These are the overarching goals to be used throughout the patient stay.    Goal: Readiness for Transition of Care  Outcome: Progressing     Problem: Violence Risk or Actual  Goal: Anger and Impulse Control  Intervention: Minimize Safety Risk  Recent Flowsheet Documentation  Taken 11/2/2023 0900 by Radha Cisneros RN  Sensory Stimulation Regulation: care clustered  Enhanced Safety Measures: room near unit station     Problem: Risk for Delirium  Goal: Improved Attention and Thought Clarity  Outcome: Not Progressing  Intervention: Maximize Cognitive Function  Recent Flowsheet Documentation  Taken 11/2/2023 0900 by Radha Cisneros RN  Sensory Stimulation Regulation: care clustered  Reorientation Measures: clock in view     Problem: Confusion Chronic  Goal: Optimal Cognitive Function  Outcome: Not Progressing  Intervention: Minimize Injury Risk and Provide Safety  Recent Flowsheet Documentation  Taken 11/2/2023 0900 by Radha Cisneros RN  Enhanced Safety Measures: room near unit station  Intervention: Minimize and Manage Confusion  Recent Flowsheet Documentation  Taken 11/2/2023 0900 by Radha Cisneros RN  Sensory Stimulation Regulation: care clustered  Reorientation Measures: clock in view   No c/o pain this shift. Pt resless and picking at gown. Redirectable at times. Assist with breakfast as pt not focused on feeding himself. PT present in room today. No prn behavioral meds admin'd  this shift.

## 2023-11-02 NOTE — PLAN OF CARE
Problem: Risk for Delirium  Goal: Improved Sleep  Outcome: Progressing   Goal Outcome Evaluation:       He was climbing out of bed at midnight, had his covers and brief on the floor, but didn't know what he wanted to do.  It took 2 people to get him re-situated in bed.  We stood him to straighten his bedding, and his legs are very weak & unsteady.  He had prn seraquel & slept for about 4 hours.  When he woke, he took his brief off and covers off again.  Attempted numerous times to put his brief back on & cover him back up, but he just takes off & throws on the floor again.  He started kicking another nurse when she tried.  He's confused calling me Claudia, and unable to be re-directed.

## 2023-11-03 NOTE — PROGRESS NOTES
[facility]    PROGRESS NOTE - Hospitalist Service    Assessment and Plan  88-year-old with severe dementia with behavioral disturbance who lives in a senior memory care unit at [place] was brought in due to recurrent falls.  He sustained acute on chronic C7 and acute on chronic T3 fracture. Neurosurgery was consulted and recommended conservative treatment.       11/01: last 1:1 since 10/25 now discontinued (most recent prior to this was 10/20), last dose of IM medication was olanzapine 10/19  Required oral Zyprexa on 10/30 for agitation     Acute/subacute C7 and T3 fracture   Chronic T1 fracture  Mechanical fall, initial encounter  - CT of head did not show any acute abnormality.   -MRI cervical spine 9/29: superior endplate compression fracture of the C7 vertebrae with loss of 15% of vertebral body height. Subtle high signal change within the anterior/superior margin of the C7 vertebrae suggests that this fracture is acute/subacute in nature. chronic compression fracture of the T1 vertebrae, acute/subacute T3 vertebrae   -Spine surgery consulted, recommend conservative management, cervical collar x 6-12 weeks time considering  is acute to subacute with follow up in 6 weeks time.       Essential Hypertension with am hypotenison  - Norvasc at 2.5 mg po daily due to am hypotension, with holding parameters  Blood Pressure elevated today, will increase amlodipine if persistently high     Acute encephalopathy  Lewy body dementia with parkinsonism  - Baseline dementia with behavioral disturbance, has confusion and anxiety at baseline  - worsened Hyperactive delirium after admission which has now resolved  - On seroquel 12.5 mg bid, 50 mg at bedtime, 25 mg bid prn for agitation; ramelteon 8 mg at bedtime, trazodone 100 mg at bedtime   - Increase Seroquel to 25 mg as patient still has agitation episodes, more cooperative and stable after drug increased  - High-dose Sinemet was DC'd on 9/30.   Patient had agitation on  of Sinemet.  As per his geriatrician plan we will continue to taper off Sinemet.  Currently on 5-50 BID, will switch to 5-50 daily on 11/2 for 14 days then stop  - IM olanzapine 2.5 mg twice daily as needed for agitation available. Last dose given 10/19  -Psychiatry consult appreciated     Neuropathy   - On lyrica 50 mg bid     History of a fib  - S/p pacemaker  - No anticoagulation because of fall     Weakness and deconditioning  - S/p recurrent falls  - Patient needs long-term care placement   -TCU refused due to agitation, encephalopathy and dementia.   - for long term memory care placement       30 MINUTES SPENT BY ME on the date of service doing chart review, history, exam, documentation & further activities per the note    Principal Problem:    Agitation  Active Problems:    Other closed nondisplaced fracture of seventh cervical vertebra, initial encounter (H)    Closed nondisplaced fracture of seventh cervical vertebra, unspecified fracture morphology, initial encounter (H)      VTE prophylaxis:  Pneumatic Compression Devices  DIET: Orders Placed This Encounter      Regular Diet Adult      Disposition/Barriers to discharge: Placement  Code Status: No CPR- Do NOT Intubate    Subjective:  Rosemary is more sleepy today, no acute significant events overnight, denies any pain.    PHYSICAL EXAM  Vitals:    10/09/23 1936 10/12/23 1300 10/13/23 0500   Weight: 65.9 kg (145 lb 4.5 oz) 63.9 kg (140 lb 14 oz) 67 kg (147 lb 11.3 oz)     B/P:141/62 T:97.4 P:61 R:16     Intake/Output Summary (Last 24 hours) at 11/3/2023 1636  Last data filed at 11/3/2023 0130  Gross per 24 hour   Intake 400 ml   Output --   Net 400 ml      Body mass index is 17.98 kg/m .    Constitutional: awake, alert, cooperative, no apparent distress, and appears stated age  Respiratory: No increased work of breathing, good air exchange, clear to auscultation bilaterally, no crackles or wheezing  Cardiovascular: Normal  apical impulse, regular rate and rhythm, normal S1 and S2, no S3 or S4, and no murmur noted  GI: No scars, normal bowel sounds, soft, non-distended, non-tender, no masses palpated, no hepatosplenomegally  Skin: no bruising or bleeding and normal skin color, texture, turgor  Musculoskeletal: There is no redness, warmth, or swelling of the joints.  Full range of motion noted.  no lower extremity pitting edema present  Neurologic: Lethargic but responded to verbal stimuli, self oriented only..    Neuropsychiatric: Appropriate with examiner      PERTINENT LABS/IMAGING:        Imaging results reviewed over the past 24 hrs:   No results found for this or any previous visit (from the past 24 hour(s)).    Discussed with patient, nursing staff and discharge planner    Brett Williamson MD  [facility] Medicine Service  [phone]

## 2023-11-03 NOTE — PROGRESS NOTES
Pt is alert and oriented to self. Clam and cooperative today. Vitals are stable and will continue to monitor.

## 2023-11-03 NOTE — PROGRESS NOTES
Care Management Follow Up    Length of Stay (days): 33    Expected Discharge Date: 11/03/2023     Concerns to be Addressed:     Discharge planning  Patient plan of care discussed at interdisciplinary rounds: Yes    Anticipated Discharge Disposition:  Needs placement, memory care     Anticipated Discharge Services:  24 hour care  Anticipated Discharge DME:  [facility]    Patient/family educated on Medicare website which has current facility and service quality ratings:  yes  Education Provided on the Discharge Plan:  yes  Patient/Family in Agreement with the Plan:  yes    Referrals Placed by CM/SW:    Private pay costs discussed: Not applicable    Additional Information:  Chart reviewed. SW spoke to patient's wife Aparna luz by phone to provide update and provided information on [facility] (Peter [phone], Fax: [phone]).  SW spoke to Peter at [facility] who states there may be some openings on a first floor home and they do specialize in memory care.        Marybeth Swanson, SHASHANKSW

## 2023-11-03 NOTE — PLAN OF CARE
"  Problem: Plan of Care - These are the overarching goals to be used throughout the patient stay.    Goal: Plan of Care Review  Description: The Plan of Care Review/Shift note should be completed every shift.  The Outcome Evaluation is a brief statement about your assessment that the patient is improving, declining, or no change.  This information will be displayed automatically on your shift note.  Outcome: Progressing  Goal: Patient-Specific Goal (Individualized)  Description: You can add care plan individualizations to a care plan. Examples of Individualization might be:  \"Parent requests to be called daily at 9am for status\", \"I have a hard time hearing out of my right ear\", or \"Do not touch me to wake me up as it startles me\".  Outcome: Progressing  Goal: Absence of Hospital-Acquired Illness or Injury  Outcome: Progressing  Intervention: Identify and Manage Fall Risk  Recent Flowsheet Documentation  Taken 11/3/2023 0200 by Jeane Tejeda, RN  Safety Promotion/Fall Prevention: activity supervised  Intervention: Prevent Skin Injury  Recent Flowsheet Documentation  Taken 11/3/2023 0131 by Jeane Tejeda, RN  Body Position: position changed independently  Goal: Optimal Comfort and Wellbeing  Outcome: Progressing  Goal: Readiness for Transition of Care  Outcome: Progressing   Goal Outcome Evaluation:       Pt awake at 0130, restless and attempting to scoot oob. Very confused conversation. Upset about the way \"the company\" is treating him. Prn seroquel given. Pt slept well the rest of the shift.                 "

## 2023-11-03 NOTE — PLAN OF CARE
Problem: Behavioral Health Comorbidity  Goal: Maintenance of Behavioral Health Symptom Control  Outcome: Progressing  Intervention: Maintain Behavioral Health Symptom Control  Recent Flowsheet Documentation  Taken 11/3/2023 0900 by Radha Cisneros RN  Medication Review/Management: medications reviewed     Problem: Violence Risk or Actual  Goal: Anger and Impulse Control  Intervention: Minimize Safety Risk  Recent Flowsheet Documentation  Taken 11/3/2023 0900 by Radha Cisneros RN  Sensory Stimulation Regulation: care clustered  Enhanced Safety Measures: room near unit station     Problem: Confusion Chronic  Goal: Optimal Cognitive Function  Outcome: Progressing  Intervention: Minimize Injury Risk and Provide Safety  Recent Flowsheet Documentation  Taken 11/3/2023 0900 by Radha Cisneros RN  Enhanced Safety Measures: room near unit station  Intervention: Minimize and Manage Confusion  Recent Flowsheet Documentation  Taken 11/3/2023 0900 by Radha Cisneros RN  Sensory Stimulation Regulation: care clustered  Reorientation Measures: clock in view   Goal Outcome Evaluation:  Pt alert to self. Cooperative after getting into chair with two staff this am. Ate all breakfast and lunch with assist/supervision. Took pills, slept after lunch, cooperative and redirectable.

## 2023-11-03 NOTE — PLAN OF CARE
Patient was calm and quiet most of shift. Recent medication changes seemed effective. Patient was feeding himself and had assistance. But after awhile threw his drinks and food onto the floor.  Later in evening patient fell asleep for about an hour then woke up and repeatedly tried to get oob and was combative. Patient is now sleeping at time of this note.  Problem: Behavioral Health Comorbidity  Goal: Maintenance of Behavioral Health Symptom Control  Intervention: Maintain Behavioral Health Symptom Control  Recent Flowsheet Documentation  Taken 11/2/2023 1715 by Shira Arvizu, RN  Medication Review/Management: medications reviewed     Problem: Orthopaedic Fracture  Goal: Fracture Stability  Outcome: Progressing     Problem: Violence Risk or Actual  Goal: Anger and Impulse Control  Intervention: Minimize Safety Risk  Recent Flowsheet Documentation  Taken 11/2/2023 1715 by Shira Arvizu RN  Enhanced Safety Measures: room near unit station  Intervention: Promote Self-Control  Recent Flowsheet Documentation  Taken 11/2/2023 1715 by Shira Arvizu, RN  Environmental Support: calm environment promoted   Goal Outcome Evaluation:

## 2023-11-04 NOTE — PLAN OF CARE
Problem: Orthopaedic Fracture  Goal: Absence of Bleeding  Outcome: Progressing     Problem: Confusion Chronic  Goal: Optimal Cognitive Function  Outcome: Progressing  Intervention: Minimize and Manage Confusion  Recent Flowsheet Documentation  Taken 11/4/2023 0020 by Louann Siu, RN  Family/Support System Care: involvement promoted     Problem: Pain Acute  Goal: Optimal Pain Control and Function  Outcome: Progressing   Goal Outcome Evaluation:                  Pt alert and oriented to self only,restless and attempted to get out of bed several times, took  all his HS aileen ,PRN Seroquel and tylenol given,neck collar on at all times,incontinence care and complete bed change done,slept on off. Will continue to monitor.

## 2023-11-04 NOTE — PROGRESS NOTES
Patient alert, confused, alterately calm and agitated. Medicated with scheduled Seroquel this shift, as well as po Zyprexa. Able to feed self and drink fluids from a cup. He wiley.

## 2023-11-04 NOTE — PROGRESS NOTES
[facility]    PROGRESS NOTE - Hospitalist Service    Assessment and Plan  88-year-old with severe dementia with behavioral disturbance who lives in a senior memory care unit at [place] was brought in due to recurrent falls.  He sustained acute on chronic C7 and acute on chronic T3 fracture. Neurosurgery was consulted and recommended conservative treatment.       11/01: last 1:1 since 10/25 now discontinued (most recent prior to this was 10/20), last dose of IM medication was olanzapine 10/19  Required oral Zyprexa on 10/30 for agitation     Acute/subacute C7 and T3 fracture   Chronic T1 fracture  Mechanical fall, initial encounter  - CT of head did not show any acute abnormality.   -MRI cervical spine 9/29: superior endplate compression fracture of the C7 vertebrae with loss of 15% of vertebral body height. Subtle high signal change within the anterior/superior margin of the C7 vertebrae suggests that this fracture is acute/subacute in nature. chronic compression fracture of the T1 vertebrae, acute/subacute T3 vertebrae   -Spine surgery consulted, recommend conservative management, cervical collar x 6-12 weeks time considering  is acute to subacute with follow up in 6 weeks time.       Essential Hypertension with am hypotenison  - Norvasc at 2.5 mg po daily due to am hypotension, with holding parameters  Blood Pressure elevated today, will increase amlodipine if persistently high     Acute encephalopathy  Lewy body dementia with parkinsonism  - Baseline dementia with behavioral disturbance, has confusion and anxiety at baseline  - worsened Hyperactive delirium after admission which has now resolved  - On seroquel 12.5 mg bid, 50 mg at bedtime, 25 mg bid prn for agitation; ramelteon 8 mg at bedtime, trazodone 100 mg at bedtime   - Increase Seroquel to 25 mg as patient still has agitation episodes, more cooperative and stable after drug increased  - High-dose Sinemet was DC'd on 9/30.   Patient had agitation on  of Sinemet.  As per his geriatrician plan we will continue to taper off Sinemet.  Currently on 5-50 BID, will switch to 5-50 daily on 11/2 for 14 days then stop  - IM olanzapine 2.5 mg twice daily as needed for agitation available. Last dose given 10/19  -Psychiatry consult appreciated     Neuropathy   - On lyrica 50 mg bid     History of a fib  - S/p pacemaker  - No anticoagulation because of fall     Weakness and deconditioning  - S/p recurrent falls  - Patient needs long-term care placement   -TCU refused due to agitation, encephalopathy and dementia.   - for long term memory care placement            30 MINUTES SPENT BY ME on the date of service doing chart review, history, exam, documentation & further activities per the note    Principal Problem:    Agitation  Active Problems:    Other closed nondisplaced fracture of seventh cervical vertebra, initial encounter (H)    Closed nondisplaced fracture of seventh cervical vertebra, unspecified fracture morphology, initial encounter (H)      VTE prophylaxis:  Enoxaparin (Lovenox) SQ  DIET: Orders Placed This Encounter      Regular Diet Adult      Disposition/Barriers to discharge: Placement  Code Status: No CPR- Do NOT Intubate    Subjective:  Rosemary is feeling about the same today, pleasant confused, no acute significant events overnight    PHYSICAL EXAM  Vitals:    10/09/23 1936 10/12/23 1300 10/13/23 0500   Weight: 65.9 kg (145 lb 4.5 oz) 63.9 kg (140 lb 14 oz) 67 kg (147 lb 11.3 oz)     B/P:146/70 T:97.3 P:85 R:18     Intake/Output Summary (Last 24 hours) at 11/4/2023 1558  Last data filed at 11/4/2023 1256  Gross per 24 hour   Intake 725 ml   Output --   Net 725 ml      Body mass index is 17.98 kg/m .    Constitutional: awake, alert, cooperative, no apparent distress, and appears stated age  Respiratory: No increased work of breathing, good air exchange, clear to auscultation bilaterally, no crackles or wheezing  Cardiovascular:  Normal apical impulse, regular rate and rhythm, normal S1 and S2, no S3 or S4, and no murmur noted  GI: No scars, normal bowel sounds, soft, non-distended, non-tender, no masses palpated, no hepatosplenomegally  Skin: no bruising or bleeding and normal skin color, texture, turgor  Musculoskeletal: There is no redness, warmth, or swelling of the joints.  Full range of motion noted.  no lower extremity pitting edema present  Neurologic: Awake, alert, oriented to self only.  Neuropsychiatric: Appropriate with examiner      PERTINENT LABS/IMAGING:        Imaging results reviewed over the past 24 hrs:   No results found for this or any previous visit (from the past 24 hour(s)).    Discussed with patient, nursing staff and discharge planner    Brett Williamson MD  [facility]  [phone]

## 2023-11-05 NOTE — PROGRESS NOTES
Care Management Follow Up    Length of Stay (days): 35    Expected Discharge Date:       Concerns to be Addressed:     discharge planning  Patient plan of care discussed at interdisciplinary rounds: Yes    Anticipated Discharge Disposition:  pending      Anticipated Discharge Services:  memory care, 24 hour care  Anticipated Discharge DME:      Patient/family educated on Medicare website which has current facility and service quality ratings:    Education Provided on the Discharge Plan:    Patient/Family in Agreement with the Plan:      Referrals Placed by CM/SW:    Private pay costs discussed: Not applicable    Additional Information:  [facility] referral sent ([facility] [phone], Fax: [phone]).   CM will follow up with Carol Higu[facility] for placement. [phone].   SW updated patient's wife Aparna, she is working on selling her home and understands plan to continue search for new placement.  Patient has been off of the 1:1 for several days. Hospitalist reports a medication adjustment has been helpful and patient has been stable.   CM will continue search for placement.     Marybeth Swanson, SHASHANKSW

## 2023-11-05 NOTE — PROGRESS NOTES
[facility]    PROGRESS NOTE - Hospitalist Service    Assessment and Plan  88-year-old with severe dementia with behavioral disturbance who lives in a senior memory care unit at [place] was brought in due to recurrent falls.  He sustained acute on chronic C7 and acute on chronic T3 fracture. Neurosurgery was consulted and recommended conservative treatment.       11/01: last 1:1 since 10/25 now discontinued (most recent prior to this was 10/20), last dose of IM medication was olanzapine 10/19     Acute/subacute C7 and T3 fracture   Chronic T1 fracture  Mechanical fall, initial encounter  - CT of head did not show any acute abnormality.   -MRI cervical spine 9/29: superior endplate compression fracture of the C7 vertebrae with loss of 15% of vertebral body height. Subtle high signal change within the anterior/superior margin of the C7 vertebrae suggests that this fracture is acute/subacute in nature. chronic compression fracture of the T1 vertebrae, acute/subacute T3 vertebrae   -Spine surgery consulted, recommend conservative management, cervical collar x 6-12 weeks time considering  is acute to subacute with follow up in 6 weeks time.    -Restart Tylenol 3 times daily for pain control     Essential Hypertension with am hypotenison  - Norvasc at 2.5 mg po daily due to am hypotension, with holding parameters  - Blood Pressure elevated today, will increase amlodipine to twice daily  -Continue to monitor blood pressure     Acute encephalopathy  Lewy body dementia with parkinsonism  - Baseline dementia with behavioral disturbance, has confusion and anxiety at baseline  - worsened Hyperactive delirium after admission which has now resolved  - On seroquel 12.5 mg bid, 50 mg at bedtime, 25 mg bid prn for agitation; ramelteon 8 mg at bedtime, trazodone 100 mg at bedtime   - Increase Seroquel to 25 mg as patient still has agitation episodes, more cooperative and stable after drug increased  -  High-dose Sinemet was DC'd on 9/30.  Patient had agitation on  of Sinemet.  As per his geriatrician plan we will continue to taper off Sinemet.  Currently on 5-50 BID, will switch to 5-50 daily on 11/2 for 14 days then stop  - IM olanzapine 2.5 mg twice daily as needed for agitation available. Last dose given 10/19  -Psychiatry consult appreciated     Neuropathy   - On lyrica 50 mg bid     History of a fib  - S/p pacemaker  - No anticoagulation because of fall     Weakness and deconditioning  - S/p recurrent falls  - Patient needs long-term care placement   -TCU refused due to agitation, encephalopathy and dementia.   - for long term memory care placement  30 MINUTES SPENT BY ME on the date of service doing chart review, history, exam, documentation & further activities per the note    Principal Problem:    Agitation  Active Problems:    Other closed nondisplaced fracture of seventh cervical vertebra, initial encounter (H)    Closed nondisplaced fracture of seventh cervical vertebra, unspecified fracture morphology, initial encounter (H)      VTE prophylaxis:  Pneumatic Compression Devices  DIET: Orders Placed This Encounter      Regular Diet Adult      Disposition/Barriers to discharge: Placement  Code Status: No CPR- Do NOT Intubate    Subjective:  Rosemary is pleasant confused denies any chest pain or shortness of breath, tolerating oral diet.    PHYSICAL EXAM  Vitals:    10/09/23 1936 10/12/23 1300 10/13/23 0500   Weight: 65.9 kg (145 lb 4.5 oz) 63.9 kg (140 lb 14 oz) 67 kg (147 lb 11.3 oz)     B/P:171/78 T:97.5 P:72 R:17     Intake/Output Summary (Last 24 hours) at 11/5/2023 1344  Last data filed at 11/5/2023 1251  Gross per 24 hour   Intake 386 ml   Output 250 ml   Net 136 ml      Body mass index is 17.98 kg/m .    Constitutional: awake, alert, cooperative, no apparent distress, and appears stated age  Respiratory: No increased work of breathing, good air exchange, clear to auscultation bilaterally, no  crackles or wheezing  Cardiovascular: Normal apical impulse, regular rate and rhythm, normal S1 and S2, no S3 or S4, and no murmur noted  GI: No scars, normal bowel sounds, soft, non-distended, non-tender, no masses palpated, no hepatosplenomegally  Skin: no bruising or bleeding and normal skin color, texture, turgor  Musculoskeletal: There is no redness, warmth, or swelling of the joints.  Full range of motion noted.  no lower extremity pitting edema present  Neurologic: Awake, alert, oriented to self only, pleasant confused..    Neuropsychiatric: Appropriate with examiner      PERTINENT LABS/IMAGING:        Imaging results reviewed over the past 24 hrs:   No results found for this or any previous visit (from the past 24 hour(s)).    Discussed with patient, nursing staff and discharge planner    Brett Williamson MD  [facility] Medicine Service  [phone]

## 2023-11-05 NOTE — PLAN OF CARE
Problem: Plan of Care - These are the overarching goals to be used throughout the patient stay.    Goal: Plan of Care Review  Description: The Plan of Care Review/Shift note should be completed every shift.  The Outcome Evaluation is a brief statement about your assessment that the patient is improving, declining, or no change.  This information will be displayed automatically on your shift note.  Outcome: Progressing  Flowsheets (Taken 11/5/2023 0946)  Plan of Care Reviewed With: patient   Goal Outcome Evaluation:  Plan is to transfer to long term memory care  Problem: Orthopaedic Fracture  Goal: Absence of Bleeding  Outcome: Progressing-no surgery for now - to wear neck brace at all times  Problem: Confusion Chronic  Goal: Optimal Cognitive Function  Intervention: Minimize Injury Risk and Provide Safety  Recent Flowsheet Documentation  Taken 11/5/2023 0900 by Ashley Krishna RN  Enhanced Safety Measures: room near unit station   Continue to monitor confusion and medicate as per mar  Problem: Pain Acute  Goal: Optimal Pain Control and Function  Outcome: Progressing-medicate as per mar  Intervention: Prevent or Manage Pain  Recent Flowsheet Documentation  Taken 11/5/2023 0900 by Ashley Krishna, RN  Medication Review/Management: medications reviewed   Patient ate all of breakfast and took medications this am - will continue to monitor       Plan of Care Reviewed With: patient

## 2023-11-05 NOTE — PLAN OF CARE
Problem: Behavioral Health Comorbidity  Goal: Maintenance of Behavioral Health Symptom Control  Outcome: Progressing   Goal Outcome Evaluation:       Patient cooperative with cares this shift. Patient took direction well.

## 2023-11-06 NOTE — PLAN OF CARE
"  Problem: Plan of Care - These are the overarching goals to be used throughout the patient stay.    Goal: Plan of Care Review  Description: The Plan of Care Review/Shift note should be completed every shift.  The Outcome Evaluation is a brief statement about your assessment that the patient is improving, declining, or no change.  This information will be displayed automatically on your shift note.  11/5/2023 2251 by Francesca Harden RN  Outcome: Progressing  11/5/2023 2250 by Francesca Harden RN  Outcome: Progressing  Goal: Patient-Specific Goal (Individualized)  Description: You can add care plan individualizations to a care plan. Examples of Individualization might be:  \"Parent requests to be called daily at 9am for status\", \"I have a hard time hearing out of my right ear\", or \"Do not touch me to wake me up as it startles me\".  11/5/2023 2251 by Francesca Harden RN  Outcome: Progressing  11/5/2023 2250 by Francesca Harden RN  Outcome: Progressing  Goal: Absence of Hospital-Acquired Illness or Injury  11/5/2023 2251 by Francesca Harden RN  Outcome: Progressing  11/5/2023 2250 by Francesca Harden RN  Outcome: Progressing  Intervention: Identify and Manage Fall Risk  Recent Flowsheet Documentation  Taken 11/5/2023 1623 by Francesca Harden RN  Safety Promotion/Fall Prevention: assistive device/personal items within reach  Intervention: Prevent and Manage VTE (Venous Thromboembolism) Risk  Recent Flowsheet Documentation  Taken 11/5/2023 1623 by Francesca Harden RN  VTE Prevention/Management: SCDs (sequential compression devices) off  Goal: Optimal Comfort and Wellbeing  11/5/2023 2251 by Francesca Harden RN  Outcome: Progressing  11/5/2023 2250 by Francesca Harden RN  Outcome: Progressing  Intervention: Monitor Pain and Promote Comfort  Recent Flowsheet Documentation  Taken 11/5/2023 1623 by Francesca Harden RN  Pain Management Interventions: medication (see MAR)  Intervention: Provide Person-Centered Care  Recent " Flowsheet Documentation  Taken 11/5/2023 1623 by Francesca Harden, RN  Trust Relationship/Rapport:   choices provided   care explained  Goal: Readiness for Transition of Care  11/5/2023 2251 by Francesca Harden, RN  Outcome: Progressing  11/5/2023 2250 by Francesca Harden, RN  Outcome: Progressing   Goal Outcome Evaluation:       Pt alert and oriented with intermittent agitation, took off his clothes and tried getting out of bed several times but was redirectable. Pt denies pain, eating and drinking very well. Had  two meals with snacks in between. Cervical collar in placed.

## 2023-11-06 NOTE — PROGRESS NOTES
CLINICAL NUTRITION SERVICES -BRIEF NOTE     Nutrition Prescription    RECOMMENDATIONS FOR MDs/PROVIDERS TO ORDER:  None    Malnutrition Status:    Moderate malnutrition In Context of: Acute on Chronic illness or disease    Recommendations already ordered by Registered Dietitian (RD):  None    Future/Additional Recommendations:  Add supplement back if intake becomes inadequate     EVALUATION OF THE PROGRESS TOWARD GOALS   Diet: Regular  Generated tray    Intake: Good    Pt eating % of meals + snacks    On generated meals pt receiving 2300 kcal, 78-96 g protein + snacks from the unit    Estimate meeting % of needs       NEW FINDINGS   -Pt awaiting placement.     Weight History: Clinically significant wt loss of 20.6% weight loss x 10 months, no recent losses   Last weight taken 10/13 - 2 x weekly weights ordered  GI: last over night x 1 watery. Soft BM prior  Edema: None per nsg  Labs: No recent  Meds: mvi with minerals, pericolace bid.     Dosing Weight: 65.9 kg     ASSESSED NUTRITION NEEDS  Estimated Energy Needs: 0090-4300 kcals/day (30 - 35 kcals/kg )  Justification: Underweight  Estimated Protein Needs: 79-99 grams protein/day (1.2 - 1.5 grams of pro/kg)  Justification: Increased needs  Estimated Fluid Needs: 4869-0697 mL/day (25 - 30 mL/kg)   Justification: Maintenance      MALNUTRITION:   % Weight Loss:  > 20% in 1 year (severe malnutrition)  % Intake:  No decreased intake noted  Subcutaneous Fat Loss:  Orbital region moderate-severe depletion  Muscle Loss:  Temporal region moderate depletion, Clavicle bone region severe depletion, and Acromion bone region moderate depletion  Fluid Retention:  Trace    Malnutrition Diagnosis: Moderate malnutrition  In Context of:  Acute on Chronic illness or disease- Improving     NUTRITION DIAGNOSIS  Malnutrition r/t AMS evidenced by 20.6% weight loss x 10 months, severe fat and muscle loss    INTERVENTIONS  Implementation  Continue to attempt to get weight when  able      Monitoring/Evaluation  Progress toward goals will be monitored and evaluated per protocol.

## 2023-11-06 NOTE — PLAN OF CARE
Problem: Plan of Care - These are the overarching goals to be used throughout the patient stay.    Goal: Readiness for Transition of Care  Outcome: Progressing     Problem: Risk for Delirium  Goal: Improved Behavioral Control  Outcome: Progressing  Intervention: Prevent and Manage Agitation  Recent Flowsheet Documentation  Taken 11/6/2023 1100 by Den Portillo, RN  Environment Familiarity/Consistency: daily routine followed  Intervention: Minimize Safety Risk  Recent Flowsheet Documentation  Taken 11/6/2023 1100 by Den Portillo, RN  Communication Enhancement Strategies: one-step directions provided  Trust Relationship/Rapport:   choices provided   care explained   Goal Outcome Evaluation:       Pt was oriented to self. Pt's behaviors managed with scheduled medications. Pt was calm and cooperative. Pt reminded to use the urinal when needing to void instead of pulling his disposable brief to the side and urinating into the sheets on his recliner. Pt's face shaved per request. Food and fluids encouraged to keep Pt occupied throughout the shift. No other concerns noted.   Den Portillo RN  11/6/2023  2:59 PM

## 2023-11-06 NOTE — PROGRESS NOTES
[facility]    PROGRESS NOTE - Hospitalist Service    Assessment and Plan  88-year-old with severe dementia with behavioral disturbance who lives in a senior memory care unit at [place] was brought in due to recurrent falls.  He sustained acute on chronic C7 and acute on chronic T3 fracture. Neurosurgery was consulted and recommended conservative treatment.       11/01: last 1:1 since 10/25 now discontinued (most recent prior to this was 10/20), last dose of IM medication was olanzapine 10/19     Acute/subacute C7 and T3 fracture   Chronic T1 fracture  Mechanical fall, initial encounter  - CT of head did not show any acute abnormality.   -MRI cervical spine 9/29: superior endplate compression fracture of the C7 vertebrae with loss of 15% of vertebral body height. Subtle high signal change within the anterior/superior margin of the C7 vertebrae suggests that this fracture is acute/subacute in nature. chronic compression fracture of the T1 vertebrae, acute/subacute T3 vertebrae   -Spine surgery consulted, recommend conservative management, cervical collar x 6-12 weeks time considering  is acute to subacute with follow up in 6 weeks time.    - Restart Tylenol 3 times daily for pain control     Essential Hypertension with am hypotenison  - Norvasc at 2.5 mg po daily due to am hypotension, with holding parameters  - Blood Pressure elevated today, will increase amlodipine to twice daily  -Continue to monitor blood pressure  -Check BMP every 2 weeks    Acute encephalopathy  Lewy body dementia with parkinsonism  - Baseline dementia with behavioral disturbance, has confusion and anxiety at baseline  - worsened Hyperactive delirium after admission which has now resolved  - On seroquel 12.5 mg bid, 50 mg at bedtime, 25 mg bid prn for agitation; ramelteon 8 mg at bedtime, trazodone 100 mg at bedtime   - Increase Seroquel to 25 mg as patient still has agitation episodes, more cooperative and stable  after drug increased  - High-dose Sinemet was DC'd on 9/30.  Patient had agitation on  of Sinemet.  As per his geriatrician plan we will continue to taper off Sinemet.    - Currently on 5-50 BID, will switch to 5-50 daily on 11/2 for 14 days then stop  - IM olanzapine 2.5 mg twice daily as needed for agitation available. Last dose given 10/19  - Psychiatry consult appreciated     Neuropathy   - On lyrica 50 mg bid     History of a fib  - S/p pacemaker  - No anticoagulation because of fall     Weakness and deconditioning  - S/p recurrent falls  - Patient needs long-term care placement  -TCU refused due to agitation, encephalopathy and dementia.   -  for long term memory care placement      30 MINUTES SPENT BY ME on the date of service doing chart review, history, exam, documentation & further activities per the note    Principal Problem:    Agitation  Active Problems:    Other closed nondisplaced fracture of seventh cervical vertebra, initial encounter (H)    Closed nondisplaced fracture of seventh cervical vertebra, unspecified fracture morphology, initial encounter (H)      VTE prophylaxis:  Enoxaparin (Lovenox) SQ  DIET: Orders Placed This Encounter      Regular Diet Adult      Disposition/Barriers to discharge: Memory care placement  Code Status: No CPR- Do NOT Intubate    Subjective:  Rosemary is pleasant confused, no acute significant events overnight, tolerating oral diet.    PHYSICAL EXAM  Vitals:    10/09/23 1936 10/12/23 1300 10/13/23 0500   Weight: 65.9 kg (145 lb 4.5 oz) 63.9 kg (140 lb 14 oz) 67 kg (147 lb 11.3 oz)     B/P:151/71 T:98.9 P:87 R:20     Intake/Output Summary (Last 24 hours) at 11/6/2023 1509  Last data filed at 11/6/2023 0900  Gross per 24 hour   Intake 400 ml   Output 1 ml   Net 399 ml      Body mass index is 17.98 kg/m .    Constitutional: awake, alert, cooperative, no apparent distress, and appears stated age  Respiratory: No increased work of breathing, good air exchange, clear to  auscultation bilaterally, no crackles or wheezing  Cardiovascular: Normal apical impulse, regular rate and rhythm, normal S1 and S2, no S3 or S4, and no murmur noted  GI: No scars, normal bowel sounds, soft, non-distended, non-tender, no masses palpated, no hepatosplenomegally  Skin: no bruising or bleeding and normal skin color, texture, turgor  Musculoskeletal: There is no redness, warmth, or swelling of the joints.  Full range of motion noted.  no lower extremity pitting edema present  Neurologic: Awake, alert, oriented to self only.  Cranial nerves II-XII are grossly intact.  Motor is 5 out of 5 bilaterally.   Sensory is intact.    Neuropsychiatric: Appropriate with examiner      PERTINENT LABS/IMAGING:        Imaging results reviewed over the past 24 hrs:   No results found for this or any previous visit (from the past 24 hour(s)).    Discussed with patient, family, nursing staff and discharge planner    Brett Williamson MD  [facility]  [phone]

## 2023-11-06 NOTE — PLAN OF CARE
Problem: Behavioral Health Comorbidity  Goal: Maintenance of Behavioral Health Symptom Control  Outcome: Progressing   Goal Outcome Evaluation:       Patient easily redirected and compliant with cares. Patient had a large BM this shift.

## 2023-11-06 NOTE — PROGRESS NOTES
Care Management Follow Up    Length of Stay (days): 36    Expected Discharge Date: 11/06/2023     Concerns to be Addressed:     Discharge planning  Patient plan of care discussed at interdisciplinary rounds: Yes    Anticipated Discharge Disposition:  Needs placement, memory care     Anticipated Discharge Services:  24 hour care  Anticipated Discharge DME:  rosmery    Patient/family educated on Medicare website which has current facility and service quality ratings:  yes  Education Provided on the Discharge Plan:  yes  Patient/Family in Agreement with the Plan:  yes    Referrals Placed by CM/SW:    Private pay costs discussed: Not applicable    Additional Information:  Chart reviewed; message left for [facility] (Peter [phone], Fax: [phone]) inquiring about onsite assessment; requested call to be returned. ELEN also placed call to [facility] ([phone]) and left message requesting call to be returned as pt has been off 1:1 since 10/25.  2:36 PM    Peter called back and stated that pt's spouse (Aparna) who will be touring facility Wed, 10/8. After tour, Peter will connect with ELEN to see if appropriate fit and then will schedule on site assessment if appropriate.  2:38 PM    ELEN spoke with [facility] admissions who stated they will re-review for [facility] bed since off 1:1 as of 10/25.  3:40 PM    CHRISTINE Dhillon  11/6/2023

## 2023-11-07 NOTE — PLAN OF CARE
Problem: Plan of Care - These are the overarching goals to be used throughout the patient stay.    Goal: Absence of Hospital-Acquired Illness or Injury  Intervention: Identify and Manage Fall Risk  Recent Flowsheet Documentation  Taken 11/6/2023 1815 by Jennifer Kingsley, RN  Safety Promotion/Fall Prevention: activity supervised  Goal: Optimal Comfort and Wellbeing  Intervention: Monitor Pain and Promote Comfort  Recent Flowsheet Documentation  Taken 11/6/2023 1501 by Jennifer Kingsley, RN  Pain Management Interventions: medication (see MAR)     Problem: Orthopaedic Fracture  Goal: Optimal Pain Control and Function  Intervention: Manage Acute Orthopaedic-Related Pain  Recent Flowsheet Documentation  Taken 11/6/2023 1501 by Jennifer Kingsley, RN  Pain Management Interventions: medication (see MAR)     Goal Outcome Evaluation:  Patient alert to self and has has intermittent agitation but has been redirectable. Patient had one loose BM on shift.

## 2023-11-07 NOTE — PROGRESS NOTES
SPIRITUAL HEALTH SERVICES ([facility])  SPIRITUAL ASSESSMENT Progress Note  [facility]. Unit P$    REFERRAL SOURCE: LOS     Mr Darian was more agitated today than on previous visits. He was fixated on washing his hair though his wife confirmed that it had already been washed today. In a effort to redirect him we spoke about his home country, [place] [facility] and his career as a . We then prayed  some traditional Rastafari prayers  and he joined in. Upon their completion he returned to requesting his hair be watched.     His wife Aparna said he will be transferred to a TCU in [facility] on Nov. 8.        PLAN: Given his imminent transfer, no further spiritual care needs at this time.      Mirlande Douglas, Ph.D., [facility]      SHS available 24/7 for emergency requests/referrals, either by having the on-call  paged or by entering an ASAP/STAT consult in Epic (this will also page the on-call ).

## 2023-11-07 NOTE — PLAN OF CARE
Problem: Confusion Chronic  Goal: Optimal Cognitive Function  Outcome: Not Progressing  Intervention: Minimize and Manage Confusion  Recent Flowsheet Documentation  Taken 11/7/2023 0936 by Yamilka Godoy RN  Sensory Stimulation Regulation:   care clustered   television on  Reorientation Measures: clock in view  Environmental Support: calm environment promoted  Environment Familiarity/Consistency: daily routine followed     Problem: Orthopaedic Fracture  Goal: Effective Bowel Elimination  Outcome: Progressing  Goal: Optimal Pain Control and Function  Outcome: Progressing  Intervention: Manage Acute Orthopaedic-Related Pain  Recent Flowsheet Documentation  Taken 11/7/2023 0936 by Yamilka Godoy RN  Sleep/Rest Enhancement:   relaxation techniques promoted   visualization  Goal: Effective Oxygenation and Ventilation  Outcome: Progressing  Intervention: Promote Airway Secretion Clearance  Recent Flowsheet Documentation  Taken 11/7/2023 0936 by Yamilka Godoy RN  Activity Management:   activity adjusted per tolerance   activity encouraged  Intervention: Optimize Oxygenation and Ventilation  Recent Flowsheet Documentation  Taken 11/7/2023 0936 by Yamilka Godoy RN  Head of Bed (HOB) Positioning: HOB at 20-30 degrees     Problem: Pain Acute  Goal: Optimal Pain Control and Function  Outcome: Progressing  Intervention: Prevent or Manage Pain  Recent Flowsheet Documentation  Taken 11/7/2023 0936 by Yamilka Godoy RN  Sensory Stimulation Regulation:   care clustered   television on  Sleep/Rest Enhancement:   relaxation techniques promoted   visualization  Medication Review/Management: medications reviewed   Goal Outcome Evaluation:             Pt remains confused to place, time and situation.  Slightly agitated this morning.  Did take morning meds and ate breakfast.  However uncooperative with getting into chair.  Pt repositioned back into bed, and now resting again.  Pt had BM last evening, denies pain when asked,  but receives scheduled Tylenol.

## 2023-11-07 NOTE — PROGRESS NOTES
Care Management Follow Up    Length of Stay (days): 37    Expected Discharge Date: 11/07/2023     Concerns to be Addressed:     Discharge planning  Patient plan of care discussed at interdisciplinary rounds: Yes    Anticipated Discharge Disposition:  Needs placement, memory care     Anticipated Discharge Services:  24 hour care  Anticipated Discharge DME:  rosmery    Patient/family educated on Medicare website which has current facility and service quality ratings:  yes  Education Provided on the Discharge Plan:  yes  Patient/Family in Agreement with the Plan:  yes    Referrals Placed by CM/SW:    Private pay costs discussed: Not applicable    Additional Information:  SW spoke with [facility] admissions who stated that [facility] no longer has beds. SW spoke with spouse who stated that she would like additional referrals sent to [facility], [facility], [facility] and [facility]. Referrals sent and pending.   2:51 PM    SW spoke with admissions at [facility]; no LTC mem beds (only TCU).  2:54 PM    Pt accepted to [facility] with a Down Payment of $5000 (agreeable to cost). ELEN spoke with wife who is agreeable to discharge plan to facility and will work with admissions on transferring to [facility] when bed becomes available if interested. Wife needing transportation.  health stretcher ride set for 1100 with a  window between [phone] (agreeable to private pay). All parties aware and agreeable to discharge plan. PAS and PCS done. SW following.  3:26 PM    CHRISTINE Dhillon  11/7/2023

## 2023-11-07 NOTE — PROGRESS NOTES
[facility]    PROGRESS NOTE - Hospitalist Service    Assessment and Plan  88-year-old with severe dementia with behavioral disturbance who lives in a senior memory care unit at [facility] was brought in due to recurrent falls.  He sustained acute on chronic C7 and acute on chronic T3 fracture. Neurosurgery was consulted and recommended conservative treatment.       Last 1:1 was 10/25 now discontinued (most recent prior to this was 10/20), last dose of IM medication was olanzapine 10/19     Acute/subacute C7 and T3 fracture   Chronic T1 fracture  Mechanical fall, initial encounter  - CT of head did not show any acute abnormality.   -MRI cervical spine 9/29: superior endplate compression fracture of the C7 vertebrae with loss of 15% of vertebral body height. Subtle high signal change within the anterior/superior margin of the C7 vertebrae suggests that this fracture is acute/subacute in nature. chronic compression fracture of the T1 vertebrae, acute/subacute T3 vertebrae   -Spine surgery consulted, recommend conservative management, cervical collar x 6-12 weeks time considering  is acute to subacute with follow up in 6 weeks time.    - Restart Tylenol 3 times daily for pain control     Essential Hypertension with am hypotenison  - Norvasc at 2.5 mg po daily due to am hypotension, with holding parameters  - Blood Pressure elevated today, will increase amlodipine to twice daily  -Continue to monitor blood pressure  -Check BMP every 2 weeks    Acute encephalopathy  Lewy body dementia with parkinsonism  - Baseline dementia with behavioral disturbance, has confusion and anxiety at baseline  - worsened Hyperactive delirium after admission which has now resolved  - On seroquel 12.5 mg bid, 50 mg at bedtime, 25 mg bid prn for agitation; ramelteon 8 mg at bedtime, trazodone 100 mg at bedtime   - Increase Seroquel to 25 mg as patient still has agitation episodes, more cooperative and stable after drug  increased  - High-dose Sinemet was DC'd on 9/30.  Patient had agitation on  of Sinemet.  As per his geriatrician plan we will continue to taper off Sinemet.    - Currently on 5-50 BID, will switch to 5-50 daily on 11/2 for 14 days then stop  - IM olanzapine 2.5 mg twice daily as needed for agitation available. Last dose given 10/19  - Psychiatry consult appreciated     Neuropathy   - On lyrica 50 mg bid     History of a fib  - S/p pacemaker  - No anticoagulation because of fall     Weakness and deconditioning  - S/p recurrent falls  - Patient needs long-term care placement  -TCU refused due to agitation, encephalopathy and dementia.   -  for long term memory care placement      30 MINUTES SPENT BY ME on the date of service doing chart review, history, exam, documentation & further activities per the note    Principal Problem:    Agitation  Active Problems:    Other closed nondisplaced fracture of seventh cervical vertebra, initial encounter (H)    Closed nondisplaced fracture of seventh cervical vertebra, unspecified fracture morphology, initial encounter (H)      VTE prophylaxis:  Enoxaparin (Lovenox) SQ  DIET: Orders Placed This Encounter      Regular Diet Adult      Disposition/Barriers to discharge: Memory care placement  Code Status: No CPR- Do NOT Intubate    Subjective:  Rosemary is pleasant confused, no acute significant events overnight, tolerating oral diet.    PHYSICAL EXAM  Vitals:    10/09/23 1936 10/12/23 1300 10/13/23 0500   Weight: 65.9 kg (145 lb 4.5 oz) 63.9 kg (140 lb 14 oz) 67 kg (147 lb 11.3 oz)     B/P:151/71 T:98.9 P:87 R:20     Intake/Output Summary (Last 24 hours) at 11/6/2023 1509  Last data filed at 11/6/2023 0900  Gross per 24 hour   Intake 400 ml   Output 1 ml   Net 399 ml      Body mass index is 17.98 kg/m .    Constitutional: awake, alert, cooperative, no apparent distress, and appears stated age  Respiratory: No increased work of breathing  Cardiovascular: regular rate and  rhythm  GI: soft, non-distended, non-tender  Skin: no acute rashes noted  Musculoskeletal: There is no redness, warmth, or swelling of the joints.  Neurologic: Awake, alert, oriented to self only.   Neuropsychiatric: Appropriate with examiner      PERTINENT LABS/IMAGING:    I have personally reviewed the following data over the past 24 hrs:    7.8  \   12.3 (L)   / 236     141 104 31.1 (H) /  86   4.4 27 0.76 \       Imaging results reviewed over the past 24 hrs:   No results found for this or any previous visit (from the past 24 hour(s)).    Discussed with patient, family, nursing staff and discharge planner

## 2023-11-08 NOTE — DISCHARGE SUMMARY
[facility]  Hospitalist Discharge Summary      Date of Admission:  9/29/2023  Date of Discharge:  11/8/2023 11:01 AM  Discharging Provider: AMANDA WHITE MD  Discharge Service: Hospitalist Service    Discharge Diagnoses     Acute/subacute C7 and T3 fracture   Chronic T1 fracture  Mechanical fall, initial encounter  Essential Hypertension  Acute encephalopathy  Lewy body dementia with parkinsonism  Neuropathy  History of a fib  Weakness and deconditioning     Clinically Significant Risk Factors     # Moderate Malnutrition: based on nutrition assessment      Follow-ups Needed After Discharge   Follow-up Appointments     Follow Up and recommended labs and tests      Per neurosurgery:  Recommend cervical collar at all times, light activity, follow up in 6   weeks (from 10/2/23) with upright XR prior. Our office will coordinate   follow up. Will place orthotics consult for kristie collar for showering as   well.        Follow-up and recommended labs and tests       Your Neurosurgical follow up appointments have been recommended in 6   weeks with XR prior. You may call [phone] to make, confirm or change   your follow-up Neurosurgery appointment dates and/or times.            Unresulted Labs Ordered in the Past 30 Days of this Admission       No orders found from 8/30/2023 to 9/30/2023.            Discharge Disposition   Discharged to long-term care facility  Condition at discharge: Stable    Hospital Course   88-year-old with severe dementia with behavioral disturbance who lives in a senior memory care unit at [place] was brought in due to recurrent falls.  He sustained acute on chronic C7 and acute on chronic T3 fracture. Neurosurgery was consulted and recommended conservative treatment.       Last 1:1 was 10/25 now discontinued (most recent prior to this was 10/20), last dose of IM medication was olanzapine 10/19     #Acute/subacute C7 and T3 fracture   #Chronic T1  fracture  #Mechanical fall, initial encounter  - CT of head did not show any acute abnormality.   - MRI cervical spine 9/29: superior endplate compression fracture of the C7 vertebrae with loss of 15% of vertebral body height. Subtle high signal change within the anterior/superior margin of the C7 vertebrae suggests that this fracture is acute/subacute in nature. chronic compression fracture of the T1 vertebrae, acute/subacute T3 vertebrae   - Spine surgery consulted, recommend conservative management, cervical collar x 6-12 weeks time with follow up in 6 weeks in clinic to re-evalaute     #Essential Hypertension  - Increased BP here, start amlodipine 2.5mg BID     #Acute encephalopathy  #Lewy body dementia with parkinsonism  - Baseline dementia with behavioral disturbance, has confusion and anxiety at baseline  - Worsened Hyperactive delirium after admission which has now resolved  - Adjusted home meds to: seroquel 25 mg bid, 50 mg at bedtime, 25 mg bid prn for agitation; ramelteon 4mg at bedtime, trazodone 100 mg at bedtime   - Above adjustments have kept him calm  - High-dose Sinemet was DC'd on 9/30.  Patient had agitation on  of Sinemet.  As per his geriatrician plan we will continue to taper off Sinemet.    - Switched to Sinemet 5-50 daily on 11/2 for 14 days then stop  - IM olanzapine 2.5 mg twice daily as needed for agitation available. Last dose given 10/19  - Psychiatry consult appreciated     Neuropathy   - Continue PTA lyrica 50 mg bid     History of a fib  - S/p pacemaker  - No anticoagulation because of fall     Weakness and deconditioning  - S/p recurrent falls  - Patient needs long-term care placement  -  for long term memory care placement; discharged 11/8    Consultations This Hospital Stay   ORTHOSIS BRACE IP CONSULT  PHYSICAL THERAPY ADULT IP CONSULT  OCCUPATIONAL THERAPY ADULT IP CONSULT  CARE MANAGEMENT / SOCIAL WORK IP CONSULT  PSYCHIATRY IP CONSULT  PSYCHIATRY IP CONSULT    Code  Status   Prior    Time Spent on this Encounter   I, AMANDA WHITE MD, personally saw the patient today and spent greater than 30 minutes discharging this patient.       AMANDA WHITE MD  [address] [address]  Phone: [phone]  Fax: [phone]  ______________________________________________________________________    Physical Exam   Vital Signs: Temp: 98.5  F (36.9  C) Temp src: Axillary BP: (!) 162/78 Pulse: 93   Resp: 18 SpO2: 96 % O2 Device: None (Room air)    Weight: 147 lbs 11.33 oz    Constitutional: awake, alert, cooperative, no apparent distress, and appears stated age  HENT: C-collar in place  Respiratory: No increased work of breathing  Cardiovascular: regular rate and rhythm  GI: soft, non-distended, non-tender  Skin: no acute rashes noted  Musculoskeletal: There is no redness, warmth, or swelling of the joints.  Neurologic: Awake, alert, oriented to self only.   Neuropsychiatric: Appropriate with examiner       Primary Care Physician   SUSU PARRISH    Discharge Orders      XR Cervical Spine 2/3 Views     Follow-up and recommended labs and tests     Your Neurosurgical follow up appointments have been recommended in 6 weeks with XR prior. You may call [phone] to make, confirm or change your follow-up Neurosurgery appointment dates and/or times.     Activity    Your activity upon discharge: wear cervical collar at all times. May remove for skin checks 3 times daily when laying flat. May keep off for 10 minutes at a time when laying flat to let skin breath. Wear [place] collar for showering. Please limit your lifting to no more that ten pounds and avoid excessive bending, twisting and turning. You should also avoid excessive jostling and jarring activities.     Follow Up and recommended labs and tests    Per neurosurgery:  Recommend cervical collar at all times, light activity, follow up in 6 weeks (from 10/2/23) with  upright XR prior. Our office will coordinate follow up. Will place orthotics consult for kristie padmini for showering as well.     Reason for your hospital stay    You were admitted for falls at home and needed more assistance with daily life. You were discharged to a LTC facility with the assistance of your wife.     Activity - Up with nursing assistance    C-collar to remain in place at all times until neurosurgery re-evaluates in clinic. They recommend 6 weeks from 10/2/23 and they have said they will coordinate this follow up.     General info for SNF    Patient stable here, just transferring for LTC. Has been stable on current medications.    Patient is okay to admit to a secure unit.    Length of Stay Estimate: Long Term Care  Condition at Discharge: Stable  Level of care:board and care  Rehabilitation Potential: Poor  Admission H&P remains valid and up-to-date: Yes  Recent Chemotherapy: N/A  Use Nursing Home Standing Orders: Yes     Fall precautions     Diet    Follow this diet upon discharge: Orders Placed This Encounter      Room Service      Regular Diet Adult       Significant Results and Procedures   Most Recent 3 CBC's:  Recent Labs   Lab Test 11/07/23  0522 10/23/23  0532 10/13/23  0524 10/06/23  0603 09/29/23  0324   WBC 7.8  --   --  9.1 10.8   HGB 12.3*  --   --  13.2* 11.8*   MCV 96  --   --  96 94    314 369 237 285     Most Recent 3 BMP's:  Recent Labs   Lab Test 11/07/23  0522 10/13/23  1202 10/12/23  1437 10/06/23  0603 09/30/23  2222 09/29/23  0324     --   --  141  --  143   POTASSIUM 4.4  --   --  4.2  --  4.4   CHLORIDE 104  --   --  105  --  107   CO2 27  --   --  28  --  24   BUN 31.1*  --   --  38.2*  --  26.1*   CR 0.76  --  1.14 0.88  --  0.81   ANIONGAP 10  --   --  8  --  12   ANA 8.9  --   --  8.8  --  8.9   GLC 86 119*  --  99   < > 106*    < > = values in this interval not displayed.   ,   Results for orders placed or performed during the hospital encounter of 09/29/23    Elbow XR,  2 views, left    Narrative    EXAM: LEFT ELBOW 3 VIEWS  LOCATION: [facility]  DATE/TIME: 9/29/2023 3:49 AM CDT    INDICATION: Fall. Left elbow pain.  COMPARISON: None.      Impression    IMPRESSION:   1. No visualized acute fracture or malalignment of the left elbow.  2. No left elbow joint effusion.    XR Humerus Left G/E 2 Views    Narrative    EXAM: XR HUMERUS LEFT G/E 2 VIEWS  LOCATION: [facility]  DATE: 9/29/2023    INDICATION: Left upper arm pain, fall.  COMPARISON: None.      Impression    IMPRESSION: Osteopenia. Negative for fracture or dislocation. Degenerative changes at the acromioclavicular joint. Left-sided cardiac pacer noted.   Head CT w/o contrast    Narrative    EXAM: CT HEAD W/O CONTRAST, CT CERVICAL SPINE W/O CONTRAST  LOCATION: [facility]  DATE: 9/29/2023    INDICATION: Fall, head injury.  COMPARISON: Head and cervical spine CT 02/19/2023.  TECHNIQUE:   1) Routine CT Head without IV contrast. Multiplanar reformats. Dose reduction techniques were used.  2) Routine CT Cervical Spine without IV contrast. Multiplanar reformats. Dose reduction techniques were used.    FINDINGS:   HEAD CT:   INTRACRANIAL CONTENTS: No intracranial hemorrhage, extraaxial collection, or mass effect.  No CT evidence of acute infarct. Moderate volume loss and mild burden presumed chronic small vessel ischemia. Moderate ventriculomegaly is stable and chronic.    VISUALIZED ORBITS/SINUSES/MASTOIDS: No intraorbital abnormality. No paranasal sinus mucosal disease. No middle ear or mastoid effusion.    BONES/SOFT TISSUES: No acute abnormality.    CERVICAL SPINE CT:   VERTEBRA: Alignment is normal. Interval development of mild superior endplate compressions of C7 and T1 compared to 02/19/2023. 30% loss of height at C7 and 20% loss of height at T1. These are age-indeterminate. No additional cervical spine fracture.   Mild loss of disc space  height at C5-C6 and mild multilevel facet arthropathy.    CANAL/FORAMINA: No canal or neural foraminal stenosis.    PARASPINAL: No extraspinal abnormality. Visualized lung fields are clear.      Impression    IMPRESSION:  HEAD CT:  1.  No acute intracranial abnormality or significant change compared to the prior study.     CERVICAL SPINE CT:  1.  Mild superior endplate height loss at C7 and T1 has developed since the cervical spine CT of 02/19/2023. These are technically age-indeterminate. No retropulsion.    2.  No other fracture.                  Cervical spine CT w/o contrast    Narrative    EXAM: CT HEAD W/O CONTRAST, CT CERVICAL SPINE W/O CONTRAST  LOCATION: [facility]  DATE: 9/29/2023    INDICATION: Fall, head injury.  COMPARISON: Head and cervical spine CT 02/19/2023.  TECHNIQUE:   1) Routine CT Head without IV contrast. Multiplanar reformats. Dose reduction techniques were used.  2) Routine CT Cervical Spine without IV contrast. Multiplanar reformats. Dose reduction techniques were used.    FINDINGS:   HEAD CT:   INTRACRANIAL CONTENTS: No intracranial hemorrhage, extraaxial collection, or mass effect.  No CT evidence of acute infarct. Moderate volume loss and mild burden presumed chronic small vessel ischemia. Moderate ventriculomegaly is stable and chronic.    VISUALIZED ORBITS/SINUSES/MASTOIDS: No intraorbital abnormality. No paranasal sinus mucosal disease. No middle ear or mastoid effusion.    BONES/SOFT TISSUES: No acute abnormality.    CERVICAL SPINE CT:   VERTEBRA: Alignment is normal. Interval development of mild superior endplate compressions of C7 and T1 compared to 02/19/2023. 30% loss of height at C7 and 20% loss of height at T1. These are age-indeterminate. No additional cervical spine fracture.   Mild loss of disc space height at C5-C6 and mild multilevel facet arthropathy.    CANAL/FORAMINA: No canal or neural foraminal stenosis.    PARASPINAL: No extraspinal  abnormality. Visualized lung fields are clear.      Impression    IMPRESSION:  HEAD CT:  1.  No acute intracranial abnormality or significant change compared to the prior study.     CERVICAL SPINE CT:  1.  Mild superior endplate height loss at C7 and T1 has developed since the cervical spine CT of 02/19/2023. These are technically age-indeterminate. No retropulsion.    2.  No other fracture.                  XR Cervical Spine 2/3 Views    Narrative    EXAM: XR CERVICAL SPINE 2/3 VIEWS  LOCATION: [facility]  DATE: 9/29/2023    INDICATION: Upright x-ray in collar.  COMPARISON: Cervical CT 09/29/2023.      Impression    IMPRESSION: Satisfactory cervical alignment. Very mild loss of height superior endplate C7 on T1 noted on prior CT is redemonstrated at C7, with obscuration of T1 due to overlap of adjacent structures. Degenerative changes with interspace narrowing   centered at C5-C6 as before. Lung apices show minimal scarring. AICD leads. Cervical facet joint spondylosis. Rightward convexity curvature apex T1. Appropriate appearance to the airway and cervical prevertebral soft tissues. Otherwise unremarkable.     Cervical spine MRI w/o contrast    Narrative    EXAM: MR CERVICAL SPINE W/O CONTRAST  LOCATION: [facility]  DATE: 9/29/2023    INDICATION: Fall, neck pain, C7 and T1 compression deformities on CT  COMPARISON: MRI scan cervical spine 07/28/2016. CT scan of the cervical spine 02/19/2023. 09/29/2023.  TECHNIQUE: MRI Cervical Spine without IV contrast.    FINDINGS:     Superior endplate compression fracture involving the C7 vertebral body with loss of less than 15% of vertebral body height and no retropulsion of bony elements. There is subtle high signal change within the anterior/superior margin of the C7 vertebral   body on the STIR sequences with corresponding low signal on the T1-weighted sequences.    Superior endplate compression fracture with less than 5%  loss of vertebral body height at T1. No abnormal signal associated with this fracture.    Superior endplate T3 compression fracture deformity with subtle high signal change within the posterior aspect of the T3 vertebral body on the STIR sequences. Loss of approximately 25% to 30% of vertebral body height. No retropulsion of bony elements.   This fracture is new compared to CT scan of 02/19/2023. The T3 level was not visualized on this CT of cervical spine performed 09/29/2023.    No abnormal signal within the cervical cord. The epidural spaces clear. Pontomedullary junctions clear. No prevertebral soft tissue swelling.      C2-C3: Tiny central disc bulge. Spinal canal foramen are patent.     C3-C4: Loss of disc signal.   Spinal canal and left foramen are patent. Right foramen is mildly narrowed due to right-sided uncovertebral joint and facet hypertrophic change.  C4-C5: Spinal canal and foramen are patent. Loss of disc signal.     C5-C6: Mild central ventral osteophyte/disc complex. Left foramen is patent. Right foramen is narrowed due to right-sided facet and uncovertebral joint hypertrophic change.     C6-C7: Normal disc height. No herniation. Normal facets. No spinal canal or neural foraminal stenosis.     C7-T1: 2 mm of anterior degenerative subluxation of C7 on T1.      Impression    IMPRESSION:  1.  Superior endplate compression fracture of the C7 vertebrae with loss of 15% of vertebral body height. Subtle high signal change within the anterior/superior margin of the C7 vertebrae suggests that this fracture is acute/subacute in nature. No   retropulsion of bony elements. No compromise of the spinal canal.        2.  Subtle superior endplate compression fracture of the T1 vertebrae demonstrates no abnormal edema signal on the MRI scan. This fracture is chronic.        3.  Superior endplate compression fracture involving the T3 vertebrae demonstrate subtle high signal change within the posterior margin of the  T3 vertebrae on the STIR sequences suggesting bone edema representing a acute/subacute fracture. Loss of 25-30%   of vertebral body height. No retropulsion of bony elements.    4.  Mild DDD change of the cervical disc spaces.     XR Chest 1 View    Narrative    EXAM: XR CHEST 1 VIEW  LOCATION: [facility]  DATE: 9/29/2023    INDICATION: pre MRI device check  COMPARISON: 06/07/2022      Impression    IMPRESSION: No change in a dual-lead cardiac pacer. There is no evidence of lead discontinuity or abandoned leads. Heart size is within normal limits given technique. There is minimal left basilar scarring versus atelectasis. Right lung is clear. No   pleural effusion or pneumothorax.       Discharge Medications   Discharge Medication List as of 11/8/2023  9:58 AM        START taking these medications    Details   amLODIPine (NORVASC) 2.5 MG tablet Take 1 tablet (2.5 mg) by mouth 2 times daily, No Print Out           CONTINUE these medications which have CHANGED    Details   carbidopa-levodopa (SINEMET)  MG tablet Take 0.5 tablet by mouth daily for 7 days, No Print Out      !! QUEtiapine (SEROQUEL) 25 MG tablet Take 1 tablet (25 mg) by mouth 2 times daily 0800 & 1500, No Print Out      ramelteon (ROZEREM) 8 MG tablet Take 0.5 tablet (4 mg) by mouth at bedtime, No Print Out       !! - Potential duplicate medications found. Please discuss with provider.        CONTINUE these medications which have NOT CHANGED    Details   acetaminophen (TYLENOL) 500 MG tablet Take 500-1,000 mg by mouth daily as needed for mild pain, Historical      dorzolamide-timolol (COSOPT) 2-0.5 % ophthalmic solution Place 1 drop into both eyes 2 times daily, Historical      latanoprost (XALATAN) 0.005 % ophthalmic solution Place 1 drop into both eyes At Bedtime, Historical      polyethylene glycol (MIRALAX) 17 g packet Take 1 packet by mouth 2 times daily, Historical      !! QUEtiapine (SEROQUEL) 25 MG tablet Take 25 mg  by mouth 2 times daily as needed (restlessness/ agitation), Historical      !! QUEtiapine (SEROQUEL) 50 MG tablet Take 50 mg by mouth At Bedtime, Historical      senna-docusate (SENOKOT-S/PERICOLACE) 8.6-50 MG tablet Take 2 tablets by mouth 2 times daily, Historical      traZODone (DESYREL) 100 MG tablet Take 100 mg by mouth At Bedtime, Historical      pregabalin (LYRICA) 50 MG capsule Take 50 mg by mouth 2 times daily, Historical       !! - Potential duplicate medications found. Please discuss with provider.        STOP taking these medications       carbidopa-levodopa (SINEMET)  MG tablet Comments:   Reason for Stopping:             Allergies   Allergies   Allergen Reactions    Dilantin [Phenytoin] Other (See Comments)     vomiting    Barbiturates GI Disturbance     Other reaction(s): GI Upset    Haldol [Haloperidol]     Memantine     Alphagan [Brimonidine Tartrate] Other (See Comments)     Eye reddness

## 2023-11-08 NOTE — PROGRESS NOTES
Care Management Discharge Note    Discharge Date: 11/08/2023       Discharge Disposition:  [facility]/Mem care  Discharge Services:  n/a  Discharge DME:  n/a  Discharge Transportation: [facility] at [facility]      Private pay costs discussed: private room/amenity fees and transportation costs    PAS Confirmation Code: [id number]  Patient/family educated on Medicare website which has current facility and service quality ratings:  yes    Education Provided on the Discharge Plan:  yes  Persons Notified of Discharge Plans: yes  Patient/Family in Agreement with the Plan: yes     Handoff Referral Completed: Yes    Additional Information:  Pt discharging to [facility] (memory care) via  [employer] Stretcher at [address]. All parties aware and agreeable to discharge plan (wife understanding of $5000 down payment). SW left message for wife this morning confirming discharge plan. PAS DONE as well as PCS. No other needs from care management at this time.  10:14 AM    CHRISTINE Dhillon  11/8/2023

## 2023-11-08 NOTE — PLAN OF CARE
"  Problem: Plan of Care - These are the overarching goals to be used throughout the patient stay.    Goal: Plan of Care Review  Description: The Plan of Care Review/Shift note should be completed every shift.  The Outcome Evaluation is a brief statement about your assessment that the patient is improving, declining, or no change.  This information will be displayed automatically on your shift note.  Outcome: Progressing  Goal: Patient-Specific Goal (Individualized)  Description: You can add care plan individualizations to a care plan. Examples of Individualization might be:  \"Parent requests to be called daily at 9am for status\", \"I have a hard time hearing out of my right ear\", or \"Do not touch me to wake me up as it startles me\".  Outcome: Progressing  Goal: Absence of Hospital-Acquired Illness or Injury  Outcome: Progressing  Intervention: Identify and Manage Fall Risk  Recent Flowsheet Documentation  Taken 11/8/2023 0100 by Francy Sharma RN  Safety Promotion/Fall Prevention:   activity supervised   increased rounding and observation   increase visualization of patient   mobility aid in reach   nonskid shoes/slippers when out of bed   room door open   room near nurse's station   safety round/check completed  Intervention: Prevent Skin Injury  Recent Flowsheet Documentation  Taken 11/8/2023 0100 by Francy Sharma RN  Body Position: position changed independently  Goal: Optimal Comfort and Wellbeing  Outcome: Progressing  Intervention: Provide Person-Centered Care  Recent Flowsheet Documentation  Taken 11/8/2023 0100 by Francy Sharma RN  Trust Relationship/Rapport:   care explained   choices provided   emotional support provided   reassurance provided   thoughts/feelings acknowledged  Goal: Readiness for Transition of Care  Outcome: Progressing     Problem: Behavioral Health Comorbidity  Goal: Maintenance of Behavioral Health Symptom Control  Outcome: Progressing  Intervention: Maintain Behavioral Health Symptom " Control  Recent Flowsheet Documentation  Taken 11/8/2023 0100 by Francy Sharma RN  Medication Review/Management: medications reviewed     Problem: Orthopaedic Fracture  Goal: Absence of Bleeding  Outcome: Progressing  Goal: Effective Bowel Elimination  Outcome: Progressing  Goal: Absence of Embolism Signs and Symptoms  Outcome: Progressing  Goal: Fracture Stability  Outcome: Progressing  Goal: Optimal Functional Ability  Outcome: Progressing  Intervention: Optimize Functional Ability  Recent Flowsheet Documentation  Taken 11/8/2023 0100 by Francy Sharma RN  Activity Management:   activity adjusted per tolerance   activity encouraged  Positioning/Transfer Devices:   pillows   in use  Goal: Absence of Infection Signs and Symptoms  Outcome: Progressing  Goal: Effective Tissue Perfusion  Outcome: Progressing  Goal: Optimal Pain Control and Function  Outcome: Progressing  Goal: Effective Oxygenation and Ventilation  Outcome: Progressing  Intervention: Promote Airway Secretion Clearance  Recent Flowsheet Documentation  Taken 11/8/2023 0100 by Francy Sharma RN  Activity Management:   activity adjusted per tolerance   activity encouraged  Intervention: Optimize Oxygenation and Ventilation  Recent Flowsheet Documentation  Taken 11/8/2023 0100 by Francy Sharma RN  Head of Bed (HOB) Positioning: HOB at 20-30 degrees     Problem: Risk for Delirium  Goal: Optimal Coping  Outcome: Progressing  Goal: Improved Behavioral Control  Outcome: Progressing  Intervention: Prevent and Manage Agitation  Recent Flowsheet Documentation  Taken 11/8/2023 0100 by Francy Sharma RN  Environment Familiarity/Consistency: daily routine followed  Intervention: Minimize Safety Risk  Recent Flowsheet Documentation  Taken 11/8/2023 0100 by Francy Sharma RN  Communication Enhancement Strategies: one-step directions provided  Enhanced Safety Measures: room near unit station  Trust Relationship/Rapport:   care explained   choices provided   emotional  support provided   reassurance provided   thoughts/feelings acknowledged  Goal: Improved Attention and Thought Clarity  Outcome: Progressing  Intervention: Maximize Cognitive Function  Recent Flowsheet Documentation  Taken 11/8/2023 0100 by Francy Sharma RN  Sensory Stimulation Regulation:   care clustered   television on  Reorientation Measures: clock in view  Goal: Improved Sleep  Outcome: Progressing     Problem: Confusion Chronic  Goal: Optimal Cognitive Function  Outcome: Progressing  Intervention: Minimize Injury Risk and Provide Safety  Recent Flowsheet Documentation  Taken 11/8/2023 0100 by Francy Sharma RN  Enhanced Safety Measures: room near unit station  Intervention: Minimize and Manage Confusion  Recent Flowsheet Documentation  Taken 11/8/2023 0100 by Francy Sharma RN  Sensory Stimulation Regulation:   care clustered   television on  Reorientation Measures: clock in view  Environmental Support:   calm environment promoted   distractions minimized   rest periods encouraged  Environment Familiarity/Consistency: daily routine followed     Problem: Pain Acute  Goal: Optimal Pain Control and Function  Outcome: Progressing  Intervention: Prevent or Manage Pain  Recent Flowsheet Documentation  Taken 11/8/2023 0100 by Francy Sharma RN  Sensory Stimulation Regulation:   care clustered   television on  Medication Review/Management: medications reviewed   Goal Outcome Evaluation:  Pt slept on and off overnight, awake with cares. Pt restless at 0400 and prn Seroquel was given. Results pending.

## 2023-11-08 NOTE — PLAN OF CARE
PT Discharge Summary    Reason for therapy discharge:    Discharged to memory care     Progress towards therapy goal(s). See goals on Care Plan in [facility] electronic health record for goal details.  Goals partially met.  Barriers to achieving goals:   limited tolerance for therapy and discharge from facility.    Therapy recommendation(s):    Continued therapy is recommended.  Rationale/Recommendations:  PT goals not fully met .

## 2023-11-08 NOTE — PLAN OF CARE
"  Problem: Plan of Care - These are the overarching goals to be used throughout the patient stay.    Goal: Absence of Hospital-Acquired Illness or Injury  Intervention: Identify and Manage Fall Risk  Recent Flowsheet Documentation  Taken 11/7/2023 1836 by Jennifer Kingsley, RN  Safety Promotion/Fall Prevention:   activity supervised   increased rounding and observation   increase visualization of patient   mobility aid in reach   nonskid shoes/slippers when out of bed   room door open   room near nurse's station   safety round/check completed     Problem: Behavioral Health Comorbidity  Goal: Maintenance of Behavioral Health Symptom Control  Outcome: Progressing     Problem: Violence Risk or Actual  Goal: Anger and Impulse Control  Intervention: Minimize Safety Risk  Recent Flowsheet Documentation  Taken 11/7/2023 1836 by Jennifer Kingsley, RN  Enhanced Safety Measures: room near unit station  Taken 11/7/2023 1624 by Jennifer Kingsley, RN  Sensory Stimulation Regulation:   care clustered   television on     Goal Outcome Evaluation:  Patient remains alert to self only. Patient able to be redirected. Patient took all medication, tried placing lidocaine patch on lower back and patient started yelling \"no\" and pulled patched off. Noted in MAR. Patient wanting to get out of bed, assisted to chair and settled to sleep in chair.                       "

## 2024-01-01 ENCOUNTER — APPOINTMENT (OUTPATIENT)
Dept: RADIOLOGY | Facility: HOSPITAL | Age: 89
End: 2024-01-01
Attending: EMERGENCY MEDICINE
Payer: COMMERCIAL

## 2024-01-01 ENCOUNTER — HOSPITAL ENCOUNTER (OUTPATIENT)
Dept: RADIOLOGY | Facility: HOSPITAL | Age: 89
Discharge: HOME | End: 2024-01-05
Attending: PHYSICIAN ASSISTANT | Admitting: PHYSICIAN ASSISTANT
Payer: COMMERCIAL

## 2024-01-01 ENCOUNTER — OFFICE VISIT (OUTPATIENT)
Dept: NEUROSURGERY | Facility: CLINIC | Age: 89
End: 2024-01-01
Attending: PHYSICIAN ASSISTANT
Payer: COMMERCIAL

## 2024-01-01 ENCOUNTER — HOSPITAL ENCOUNTER (EMERGENCY)
Facility: HOSPITAL | Age: 89
Discharge: NURSING, REHAB OR LONG-TERM CARE | End: 2024-01-29
Attending: EMERGENCY MEDICINE | Admitting: EMERGENCY MEDICINE
Payer: COMMERCIAL

## 2024-01-01 VITALS
DIASTOLIC BLOOD PRESSURE: 88 MMHG | HEART RATE: 75 BPM | BODY MASS INDEX: 20.14 KG/M2 | RESPIRATION RATE: 16 BRPM | HEIGHT: 75 IN | OXYGEN SATURATION: 98 % | SYSTOLIC BLOOD PRESSURE: 176 MMHG | TEMPERATURE: 98.4 F | WEIGHT: 162 LBS

## 2024-01-01 VITALS — HEART RATE: 68 BPM | OXYGEN SATURATION: 98 % | SYSTOLIC BLOOD PRESSURE: 140 MMHG | DIASTOLIC BLOOD PRESSURE: 61 MMHG

## 2024-01-01 DIAGNOSIS — S12.601A CLOSED NONDISPLACED FRACTURE OF SEVENTH CERVICAL VERTEBRA, UNSPECIFIED FRACTURE MORPHOLOGY, INITIAL ENCOUNTER (H): Primary | ICD-10-CM

## 2024-01-01 DIAGNOSIS — S12.691A OTHER CLOSED NONDISPLACED FRACTURE OF SEVENTH CERVICAL VERTEBRA, INITIAL ENCOUNTER (H): ICD-10-CM

## 2024-01-01 DIAGNOSIS — S22.039D CLOSED FRACTURE OF THIRD THORACIC VERTEBRA WITH ROUTINE HEALING, UNSPECIFIED FRACTURE MORPHOLOGY, SUBSEQUENT ENCOUNTER: ICD-10-CM

## 2024-01-01 DIAGNOSIS — L03.011 PARONYCHIA OF RIGHT MIDDLE FINGER: ICD-10-CM

## 2024-01-01 LAB
ALBUMIN SERPL BCG-MCNC: 3.7 G/DL (ref 3.5–5.2)
ALP SERPL-CCNC: 64 U/L (ref 40–150)
ALT SERPL W P-5'-P-CCNC: 15 U/L (ref 0–70)
ANION GAP SERPL CALCULATED.3IONS-SCNC: 6 MMOL/L (ref 7–15)
AST SERPL W P-5'-P-CCNC: 32 U/L (ref 0–45)
BASOPHILS # BLD AUTO: 0 10E3/UL (ref 0–0.2)
BASOPHILS NFR BLD AUTO: 0 %
BILIRUB SERPL-MCNC: 0.4 MG/DL
BUN SERPL-MCNC: 24.6 MG/DL (ref 8–23)
CALCIUM SERPL-MCNC: 8.9 MG/DL (ref 8.8–10.2)
CHLORIDE SERPL-SCNC: 107 MMOL/L (ref 98–107)
CREAT SERPL-MCNC: 0.88 MG/DL (ref 0.67–1.17)
CRP SERPL-MCNC: 9 MG/L
DEPRECATED HCO3 PLAS-SCNC: 30 MMOL/L (ref 22–29)
EGFRCR SERPLBLD CKD-EPI 2021: 84 ML/MIN/1.73M2
EOSINOPHIL # BLD AUTO: 0 10E3/UL (ref 0–0.7)
EOSINOPHIL NFR BLD AUTO: 1 %
ERYTHROCYTE [DISTWIDTH] IN BLOOD BY AUTOMATED COUNT: 14.5 % (ref 10–15)
ERYTHROCYTE [SEDIMENTATION RATE] IN BLOOD BY WESTERGREN METHOD: 21 MM/HR (ref 0–20)
GLUCOSE SERPL-MCNC: 96 MG/DL (ref 70–99)
HCT VFR BLD AUTO: 28.5 % (ref 40–53)
HGB BLD-MCNC: 9.1 G/DL (ref 13.3–17.7)
HOLD SPECIMEN: NORMAL
IMM GRANULOCYTES # BLD: 0 10E3/UL
IMM GRANULOCYTES NFR BLD: 0 %
LACTATE SERPL-SCNC: 0.9 MMOL/L (ref 0.7–2)
LYMPHOCYTES # BLD AUTO: 1.1 10E3/UL (ref 0.8–5.3)
LYMPHOCYTES NFR BLD AUTO: 16 %
MCH RBC QN AUTO: 31.4 PG (ref 26.5–33)
MCHC RBC AUTO-ENTMCNC: 31.9 G/DL (ref 31.5–36.5)
MCV RBC AUTO: 98 FL (ref 78–100)
MONOCYTES # BLD AUTO: 0.4 10E3/UL (ref 0–1.3)
MONOCYTES NFR BLD AUTO: 6 %
NEUTROPHILS # BLD AUTO: 5.5 10E3/UL (ref 1.6–8.3)
NEUTROPHILS NFR BLD AUTO: 77 %
NRBC # BLD AUTO: 0 10E3/UL
NRBC BLD AUTO-RTO: 0 /100
PLATELET # BLD AUTO: 211 10E3/UL (ref 150–450)
POTASSIUM SERPL-SCNC: 4.1 MMOL/L (ref 3.4–5.3)
PROT SERPL-MCNC: 7 G/DL (ref 6.4–8.3)
RBC # BLD AUTO: 2.9 10E6/UL (ref 4.4–5.9)
SODIUM SERPL-SCNC: 143 MMOL/L (ref 135–145)
WBC # BLD AUTO: 7.1 10E3/UL (ref 4–11)

## 2024-01-01 PROCEDURE — 72040 X-RAY EXAM NECK SPINE 2-3 VW: CPT

## 2024-01-01 PROCEDURE — 99213 OFFICE O/P EST LOW 20 MIN: CPT | Performed by: PHYSICIAN ASSISTANT

## 2024-01-01 PROCEDURE — 73130 X-RAY EXAM OF HAND: CPT | Mod: RT

## 2024-01-01 PROCEDURE — 10060 I&D ABSCESS SIMPLE/SINGLE: CPT

## 2024-01-01 PROCEDURE — 80053 COMPREHEN METABOLIC PANEL: CPT | Performed by: EMERGENCY MEDICINE

## 2024-01-01 PROCEDURE — 85025 COMPLETE CBC W/AUTO DIFF WBC: CPT | Performed by: EMERGENCY MEDICINE

## 2024-01-01 PROCEDURE — 96375 TX/PRO/DX INJ NEW DRUG ADDON: CPT | Mod: 59

## 2024-01-01 PROCEDURE — 83605 ASSAY OF LACTIC ACID: CPT | Performed by: EMERGENCY MEDICINE

## 2024-01-01 PROCEDURE — 85652 RBC SED RATE AUTOMATED: CPT | Performed by: EMERGENCY MEDICINE

## 2024-01-01 PROCEDURE — 86140 C-REACTIVE PROTEIN: CPT | Performed by: EMERGENCY MEDICINE

## 2024-01-01 PROCEDURE — 99284 EMERGENCY DEPT VISIT MOD MDM: CPT | Mod: 25

## 2024-01-01 PROCEDURE — 96374 THER/PROPH/DIAG INJ IV PUSH: CPT | Mod: 59

## 2024-01-01 PROCEDURE — 250N000011 HC RX IP 250 OP 636: Performed by: EMERGENCY MEDICINE

## 2024-01-01 PROCEDURE — 36415 COLL VENOUS BLD VENIPUNCTURE: CPT | Performed by: EMERGENCY MEDICINE

## 2024-01-01 RX ORDER — MORPHINE SULFATE 4 MG/ML
4 INJECTION, SOLUTION INTRAMUSCULAR; INTRAVENOUS ONCE
Status: COMPLETED | OUTPATIENT
Start: 2024-01-01 | End: 2024-01-01

## 2024-01-01 RX ORDER — LORAZEPAM 2 MG/ML
1 INJECTION INTRAMUSCULAR ONCE
Status: DISCONTINUED | OUTPATIENT
Start: 2024-01-01 | End: 2024-01-01

## 2024-01-01 RX ORDER — LORAZEPAM 2 MG/ML
0.5 INJECTION INTRAMUSCULAR ONCE
Status: COMPLETED | OUTPATIENT
Start: 2024-01-01 | End: 2024-01-01

## 2024-01-01 RX ADMIN — MORPHINE SULFATE 4 MG: 4 INJECTION, SOLUTION INTRAMUSCULAR; INTRAVENOUS at 17:20

## 2024-01-01 RX ADMIN — LORAZEPAM 0.5 MG: 2 INJECTION INTRAMUSCULAR; INTRAVENOUS at 17:09

## 2024-01-01 ASSESSMENT — ACTIVITIES OF DAILY LIVING (ADL)
ADLS_ACUITY_SCORE: 35

## 2024-01-01 ASSESSMENT — PAIN SCALES - GENERAL: PAINLEVEL: NO PAIN (0)

## 2024-01-05 NOTE — LETTER
1/5/2024         RE: Rosemary Farmer  [address]  [place] [address]        Dear Colleague,    Thank you for referring your patient, Rosemary Farmer, to the [facility]. Please see a copy of my visit note below.    NEUROSURGERY CLINIC PROGRESS NOTE    DATE OF VISIT: 1/5/2024    HPI:     Rosemary Farmer is a pleasant 88 year old male who presents to the clinic today for a 3 month follow up C7 (acute/subacute), T1(chronic), T3(acute/subacute) fractures after a fall 9/29/23.     Patient spouse Aparna presents with him today. Patient denies new or worsening neck pain, radicular symptoms, paresthesias or weakness. Aparna denies any new falls, trauma, or new symptoms. Patient has had collar off for a few weeks and is doing well. He resides at a [facility] in [place].         Current Outpatient Medications   Medication     acetaminophen (TYLENOL) 500 MG tablet     amLODIPine (NORVASC) 2.5 MG tablet     carbidopa-levodopa (SINEMET)  MG tablet     dorzolamide-timolol (COSOPT) 2-0.5 % ophthalmic solution     latanoprost (XALATAN) 0.005 % ophthalmic solution     polyethylene glycol (MIRALAX) 17 g packet     pregabalin (LYRICA) 50 MG capsule     QUEtiapine (SEROQUEL) 25 MG tablet     ramelteon (ROZEREM) 8 MG tablet     senna-docusate (SENOKOT-S/PERICOLACE) 8.6-50 MG tablet     traZODone (DESYREL) 100 MG tablet     QUEtiapine (SEROQUEL) 25 MG tablet     QUEtiapine (SEROQUEL) 50 MG tablet     No current facility-administered medications for this visit.       Allergies   Allergen Reactions     Dilantin [Phenytoin] Other (See Comments)     vomiting     Barbiturates GI Disturbance     Other reaction(s): GI Upset     Haldol [Haloperidol]      Memantine      Alphagan [Brimonidine Tartrate] Other (See Comments)     Eye reddness       Past Medical History:   Diagnosis Date     Enlarged prostate      Ulcer        Review Of Systems     ROS: 10 point ROS neg other than the symptoms noted  above in the HPI.      OBJECTIVE:    BP (!) 140/61   Pulse 68   SpO2 98%     Imaging:    Narrative & Impression   EXAM: XR CERVICAL SPINE 2/3 VIEWS  LOCATION: [facility]  DATE: 1/5/2024     INDICATION: C7 and T3 fracture  please obtain down to T3. upright XR  COMPARISON: Cervical spine radiograph 09/29/2023                                                                      IMPRESSION: Unchanged appearance of the C7 fracture. Previously described T1 and T3 fractures are not well seen on the current examination. Unchanged alignment.          Radiographic Findings: Full radiological report in chart. I personally reviewed the images with the patient today.    Exam:    Patient appears comfortable and in no apparent distress. Moving all extremities.  Wheelchair   CN II-XII grossly intact, alert and appropriate with conversation and following  commands  Bilateral upper extremities with full strength including hand intrinsics and grasp.  Sensation intact throughout.  Bilateral lower extremities 5/5 strength including plantar and dorsiflexion.  Normal sensation throughout bilaterally.    No TTP along cervical or upper thoracic spinous processes or paraspinous muscles. FROM.     ASSESSMENT:    1. Closed nondisplaced fracture of seventh cervical vertebra, unspecified fracture morphology, initial encounter (H)    2. Closed fracture of third thoracic vertebra with routine healing, unspecified fracture morphology, subsequent encounter        PLAN:    Rosemary Farmer is a pleasant 88 year old male who presents to the clinic today for a 3 month follow up C7 (acute/subacute), T1(chronic), T3(acute/subacute) fractures after a fall 9/29/23.     Patient spouse Aparna presents with him today. Patient denies new or worsening neck pain, radicular symptoms, paresthesias or weakness. Aparna denies any new falls, trauma, or new symptoms. Patient has had collar off for a few weeks and is doing well. He resides at a  memory care in [place].     Imaging was reviewed today and all questions were answered.    Patient has already weaned out of collar. Pending stable XR review, he may continue without collar and follow up as needed. Red flag s/s reviewed and advised to call or present sooner should these occur.     The patient gave verbal understanding and is in agreement with the above plan. His wife will call or return to the clinic for any worsening or changes in symptoms.    Respectfully,     Josefina White PA-C  [facility] Neurosurgery  [place] [address]    Tel [phone]  Pager [phone]        Again, thank you for allowing me to participate in the care of your patient.        Sincerely,        Josefina White PA-C

## 2024-01-05 NOTE — NURSING NOTE
"Rosemary Farmer is a 88 year old male who presents for:  Chief Complaint   Patient presents with    Follow Up     Review XR  Reports no pain        Initial Vitals:  BP (!) 140/61   Pulse 68   SpO2 98%  Estimated body mass index is 17.98 kg/m  as calculated from the following:    Height as of 9/29/23: 6' 4\" (1.93 m).    Weight as of 10/13/23: 147 lb 11.3 oz (67 kg).. There is no height or weight on file to calculate BSA. BP completed using cuff size: regular  No Pain (0)    Nursing Comments: Rosemary to follow up with Primary Care provider regarding elevated blood pressure.      Chi Pena  "

## 2024-01-05 NOTE — PROGRESS NOTES
NEUROSURGERY CLINIC PROGRESS NOTE    DATE OF VISIT: 1/5/2024    HPI:     Rosemary Farmer is a pleasant 88 year old male who presents to the clinic today for a 3 month follow up C7 (acute/subacute), T1(chronic), T3(acute/subacute) fractures after a fall 9/29/23.     Patient spouse Aparna presents with him today. Patient denies new or worsening neck pain, radicular symptoms, paresthesias or weakness. Aparna denies any new falls, trauma, or new symptoms. Patient has had collar off for a few weeks and is doing well. He resides at a [facility] in [place].         Current Outpatient Medications   Medication    acetaminophen (TYLENOL) 500 MG tablet    amLODIPine (NORVASC) 2.5 MG tablet    carbidopa-levodopa (SINEMET)  MG tablet    dorzolamide-timolol (COSOPT) 2-0.5 % ophthalmic solution    latanoprost (XALATAN) 0.005 % ophthalmic solution    polyethylene glycol (MIRALAX) 17 g packet    pregabalin (LYRICA) 50 MG capsule    QUEtiapine (SEROQUEL) 25 MG tablet    ramelteon (ROZEREM) 8 MG tablet    senna-docusate (SENOKOT-S/PERICOLACE) 8.6-50 MG tablet    traZODone (DESYREL) 100 MG tablet    QUEtiapine (SEROQUEL) 25 MG tablet    QUEtiapine (SEROQUEL) 50 MG tablet     No current facility-administered medications for this visit.       Allergies   Allergen Reactions    Dilantin [Phenytoin] Other (See Comments)     vomiting    Barbiturates GI Disturbance     Other reaction(s): GI Upset    Haldol [Haloperidol]     Memantine     Alphagan [Brimonidine Tartrate] Other (See Comments)     Eye reddness       Past Medical History:   Diagnosis Date    Enlarged prostate     Ulcer        Review Of Systems     ROS: 10 point ROS neg other than the symptoms noted above in the HPI.      OBJECTIVE:    BP (!) 140/61   Pulse 68   SpO2 98%     Imaging:    Narrative & Impression   EXAM: XR CERVICAL SPINE 2/3 VIEWS  LOCATION: [facility]  DATE: 1/5/2024     INDICATION: C7 and T3 fracture  please obtain down to T3. upright  XR  COMPARISON: Cervical spine radiograph 09/29/2023                                                                      IMPRESSION: Unchanged appearance of the C7 fracture. Previously described T1 and T3 fractures are not well seen on the current examination. Unchanged alignment.          Radiographic Findings: Full radiological report in chart. I personally reviewed the images with the patient today.    Exam:    Patient appears comfortable and in no apparent distress. Moving all extremities.  Wheelchair   CN II-XII grossly intact, alert and appropriate with conversation and following  commands  Bilateral upper extremities with full strength including hand intrinsics and grasp.  Sensation intact throughout.  Bilateral lower extremities 5/5 strength including plantar and dorsiflexion.  Normal sensation throughout bilaterally.    No TTP along cervical or upper thoracic spinous processes or paraspinous muscles. FROM.     ASSESSMENT:    1. Closed nondisplaced fracture of seventh cervical vertebra, unspecified fracture morphology, initial encounter (H)    2. Closed fracture of third thoracic vertebra with routine healing, unspecified fracture morphology, subsequent encounter        PLAN:    Rosemary Farmer is a pleasant 88 year old male who presents to the clinic today for a 3 month follow up C7 (acute/subacute), T1(chronic), T3(acute/subacute) fractures after a fall 9/29/23.     Patient spouse Aparna presents with him today. Patient denies new or worsening neck pain, radicular symptoms, paresthesias or weakness. Aparna denies any new falls, trauma, or new symptoms. Patient has had collar off for a few weeks and is doing well. He resides at a [facility] in [place].     Imaging was reviewed today and all questions were answered.    Patient has already weaned out of collar. Pending stable XR review, he may continue without collar and follow up as needed. Red flag s/s reviewed and advised to call or present sooner should  these occur.     The patient gave verbal understanding and is in agreement with the above plan. His wife will call or return to the clinic for any worsening or changes in symptoms.    Respectfully,     Josefina White PA-C  [facility] Neurosurgery  [address] [address]    Tel [phone]  Pager [phone]

## 2024-01-29 NOTE — DISCHARGE INSTRUCTIONS
Continue taking the Keflex that was prescribed to you yesterday to help clear the infection. The facility should soak his finger 4 times daily to allow continued drainage from the area. Please see the hand surgeon in 1-2 days. I spoke with Dr. Rico with [facility] who wants to see you tomorrow or the next day to check on the finger and wound.     Return to the ER if the area of swelling, pain or redness increases or you re-develop pus in the wound, fever, inability to move the finger due to severe pain, or any new or worsening concerns.

## 2024-01-29 NOTE — ED PROVIDER NOTES
EMERGENCY DEPARTMENT ENCOUNTER      NAME: Rosemary Farmer  AGE: 88 year old male  YOB: 1935  MRN: [id number]  EVALUATION DATE & TIME: 1/29/2024 11:16 AM    PCP: Reji Barrow    ED PROVIDER: Annia Mcneill MD      Chief Complaint   Patient presents with    Hand Pain         FINAL IMPRESSION:  1. Paronychia of right middle finger          ED COURSE & MEDICAL DECISION MAKING:    Pertinent Labs & Imaging studies reviewed. (See chart for details)    2:49 PM I introduced myself to the patient, obtained patient history, performed a physical exam, and discussed plan for ED workup including potential diagnostic laboratory/imaging studies and interventions.    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  This has been worsening over the past couple of days per wife.  Initially was just slightly erythematous and swollen at the distal fingertip and then yesterday started developing some purulence and swelling that worsened today and thus she brought him here.  Patient is on hospice with his dementia and the hospice team prescribed him Keflex to take however this was not improving and thus they came here for possible drainage.  This appears most likely to be of more severe paronychia.  The pulp of the fingertip is soft without any firmness and this does not appear to have turned into a full felon at this point.  Did also consider more significant pathology such as osteomyelitis without severity and thus we will obtain x-ray and laboratory studies.  He is not diabetic.  No known trauma to the area.  Did consider the potential of septic arthritis as well however felt this less likely as he has full normal range of motion of the DIP MCP and PIP joints of the right middle finger.  Also considered flexor tenosynovitis however again  felt this was unlikely as most of this is dorsal and surrounding the nail itself.  He has no Kanavel signs.     White blood count is normal at 7.1.  CMP largely unremarkable.  Sed rate slightly elevated at 21 with a CRP slightly elevated at 9.  Would expect this with a infection of any kind including paronychia.  Lactic acid within normal limits.  Vital signs here are within normal limits and he is afebrile.  He is overall well-appearing.  X-ray of the right hand Reveals considerable soft tissue swelling is seen along the distal aspect of the right long finger particularly near the DIP joint.  Anatomic alignment of the right hand.  No acute displaced fracture.  Scattered mild arthritic change in the hand and wrist.  No definite radiographic finding for acute osteomyelitis with particular focus at the distal aspect of the right long finger.  I called and spoke with Dr. Rico with [facility] who reviewed the photos that I had sent of the hand and area and we discussed his exam and presentation.  He agreed that this would be unlikely to be flexor tenosynovitis and felt that this was likely just a more severe paronychia and recommended that I drained this here and that he would follow-up with the patient in 1 to 2 days for recheck.  He recommended continuing the Keflex that was previously prescribed and also performing soaks of the hand.  Discussed this plan with the patient and his wife who is in agreement.  He is at a facility at this point.  Initially was overall cooperative however has been here for multiple hours through the workup and is getting more agitated.  With his dementia this would be expected.  We did perform the digital block with only minimal issue however when trying to perform I&D the patient became more agitated and thus we did give him 0.5 mg of IV Ativan.  Had wanted to use Haldol however he is allergic to this.  He is on Seroquel however knew this would take longer for this to  fully kick in orally.  Also gave him a small dose of 4 mg of IV morphine separate from the Ativan to help with pain control.  With this he calmed and was able to tolerate the procedure.  We did drain a large amount of purulent material from the area.  Had some mild mild bleeding that was controlled as well.  Area was wrapped with nonadherent dressing and Coban to keep this on the finger.  Provided instruction on soaking the hand 4 times a day and continuing the Keflex.  Wife was in agreement.  He has remained hemodynamically stable.  Wife was provided with [facility] follow-up and instructed to call tomorrow to get an appointment tomorrow or the next day for reassessment and she was in agreement with this.  Given indications for return to the emergency department.  She voiced understanding.  Patient was discharged to his facility in stable condition.        ED Course as of 01/29/24 1838   Mon Jan 29, 2024   1510 Spoke to Dr. Ricky Rico with hand surgery who stated that I could drain this as suspects paronychia and they would follow up with him in 1-2 days for recheck. Recommended continuing keflex.        At the conclusion of the encounter I discussed the results of all of the tests and the disposition. The questions were answered. The patient or family acknowledged understanding and was agreeable with the care plan.      Medical Decision Making  Obtained supplemental history:Supplemental history obtained?: Family Member/Significant Other  Reviewed external records: External records reviewed?: No  Care impacted by chronic illness:Dementia and Heart Disease  Care significantly affected by social determinants of health:N/A  Did you consider but not order tests?: Work up considered but not performed and documented in chart, if applicable  Did you interpret images independently?: Independent interpretation of ECG and images noted in documentation, when applicable.  Consultation discussion with other  provider:Did you involve another provider (consultant, , pharmacy, etc.)?: I discussed the care with another health care provider, see documentation for details.  Discharge. I recommended the patient continue their current prescription strength medication(s): keflex as prescribed by his physician. See documentation for any additional details.      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  Wife reports that this has been ongoing for the last few days.  Started as some erythema and swelling of the tip of the finger tip and is now progressed with purulence around the nail of the right middle finger with some erythema spreading somewhat down the finger.  It is mildly uncomfortable for him but does not appear to be significantly painful.  Has normal range of motion of the finger and the hand.  No known trauma per wife.  Not on any anticoagulation other than a baby aspirin.  Wife believes he has had some similar paronychias in the past.  She denies that he has any history of diabetes or significant infections.  No known fevers.  No new numbness tingling or weakness.  Is moving the finger appropriately and it is the tip of the finger only that is swollen.  Otherwise no recent illness per wife.  Wife reports the patient is on hospice and the  hospice team had started Keflex yesterday but as this worsened she decided to bring him here for evaluation for possible drainage.  He is living in a memory care facility due to his dementia.        REVIEW OF SYSTEMS   Review of Systems   Pertinent positives and negatives are documented in the HPI. All other systems reviewed and are negative.      PAST MEDICAL HISTORY:  Past Medical History:   Diagnosis Date    Enlarged prostate     Ulcer        PAST SURGICAL HISTORY:  Past Surgical History:   Procedure Laterality Date    GLAUCOMA SURGERY  2006    SHOULDER SURGERY  08/23/2010    4 shoulder surgeries including 2 rotator cuff    VARICOCELECTOMY  1986    VARICOCELECTOMY  2008           CURRENT MEDICATIONS:    acetaminophen (TYLENOL) 500 MG tablet  amLODIPine (NORVASC) 2.5 MG tablet  carbidopa-levodopa (SINEMET)  MG tablet  dorzolamide-timolol (COSOPT) 2-0.5 % ophthalmic solution  latanoprost (XALATAN) 0.005 % ophthalmic solution  polyethylene glycol (MIRALAX) 17 g packet  pregabalin (LYRICA) 50 MG capsule  QUEtiapine (SEROQUEL) 25 MG tablet  QUEtiapine (SEROQUEL) 25 MG tablet  QUEtiapine (SEROQUEL) 50 MG tablet  ramelteon (ROZEREM) 8 MG tablet  senna-docusate (SENOKOT-S/PERICOLACE) 8.6-50 MG tablet  traZODone (DESYREL) 100 MG tablet        ALLERGIES:  Allergies   Allergen Reactions    Dilantin [Phenytoin] Other (See Comments)     vomiting    Barbiturates GI Disturbance     Other reaction(s): GI Upset    Haldol [Haloperidol]     Memantine     Alphagan [Brimonidine Tartrate] Other (See Comments)     Eye reddness       FAMILY HISTORY:  Family History   Problem Relation Age of Onset    No Known Problems Mother     Pneumonia Father     No Known Problems Maternal Grandmother     No Known Problems Maternal Grandfather     No Known Problems Paternal Grandmother     No Known Problems Paternal Grandfather     No Known Problems Sister     No Known Problems Brother     No Known Problems Maternal Half-Brother     No Known  "Problems Maternal Half-Sister     No Known Problems Paternal Half-Brother     No Known Problems Paternal Half-Sister     No Known Problems Daughter     No Known Problems Son     No Known Problems Maternal Aunt     No Known Problems Maternal Uncle     No Known Problems Paternal Aunt     No Known Problems Paternal Uncle     No Known Problems Cousin     No Known Problems Other     Aneurysm No family hx of     Brain Hemorrhage No family hx of     Brain Tumor No family hx of     Cancer No family hx of     Chiari Malformation No family hx of     Diabetes No family hx of     Heart Disease No family hx of     LUNG DISEASE No family hx of     Seizure Disorder No family hx of     Cerebrovascular Disease No family hx of     Depression No family hx of     Migraines No family hx of        SOCIAL HISTORY:   Social History     Socioeconomic History    Marital status:    Tobacco Use    Smoking status: Former     Packs/day: 1.00     Years: 7.00     Additional pack years: 0.00     Total pack years: 7.00     Types: Cigarettes     Quit date: 1976     Years since quittin.7    Smokeless tobacco: Never    Tobacco comments:     quit 40 years ago    Substance and Sexual Activity    Alcohol use: Not Currently    Drug use: No    Sexual activity: Not Currently   Other Topics Concern    Parent/sibling w/ CABG, MI or angioplasty before 65F 55M? No       VITALS:  BP (!) 176/88   Pulse 75   Temp 98.4  F (36.9  C) (Temporal)   Resp 16   Ht 1.892 m (6' 2.5\")   Wt 73.5 kg (162 lb)   SpO2 98%   BMI 20.52 kg/m      PHYSICAL EXAM    Physical Exam  Constitutional: NAD, at is mental baseline per wife  HENT: Normocephalic, Atraumatic, Bilateral external ears normal, Oropharynx normal, mucous membranes moist, Nose normal. Neck-  Normal range of motion, No tenderness, Supple, No stridor.    Eyes: PERRL, EOMI, Conjunctiva normal, No discharge.   Respiratory: Normal breath sounds, No respiratory distress, No wheezing or crackles, Speaks " in full sentences easily.    Cardiovascular: Normal heart rate, Regular rhythm,  No murmurs, No rubs, No gallops. 2+ radial pulses bilaterally  GI: Bowel sounds normal, Soft, No tenderness  Musculoskeletal: 2+ DP and radial pulses bilaterally. No notable lower extremity edema.  No cyanosis, No clubbing. Good range of motion in all major joints.  Please see photo of the right distal middle finger as below.  Does have normal range of motion at the MCP, PIP, and DIP joint of the middle finger tested individually.  Does not have any pain with passive extension.  The swelling is isolated to the tip of the finger without any signs of fusiform swelling of the remainder of the finger, the tip of the finger pulp is soft to palpation, no tenderness palpation of the flexor tendon sheath of the right middle finger, the finger is not held in flexion.  He has mild tenderness over the area of purulence and swelling at the tip of the finger as below.  Integument: Warm, Dry, See photos of right middle finger as below  Neurologic: At his mental baseline per wife, moving all 4 extremities spontaneously, Sensation intact to light touch in all 4 extremities. No focal deficits noted.   Psychiatric: Initially cooperative and then intermittently agitated.                LAB:  All pertinent labs reviewed and interpreted.  Results for orders placed or performed during the hospital encounter of 01/29/24   XR Hand Right G/E 3 Views    Impression    IMPRESSION: Considerable soft tissue swelling is seen along the distal aspect of the right long finger particularly near the DIP joint. Anatomic alignment right hand. No acute displaced fracture. Scattered mild arthritic change in the hand and wrist. No   definitive radiographic finding for acute osteomyelitis with particular focus at the distal aspect of the right long finger.   Comprehensive metabolic panel   Result Value Ref Range    Sodium 143 135 - 145 mmol/L    Potassium 4.1 3.4 - 5.3 mmol/L     Carbon Dioxide (CO2) 30 (H) 22 - 29 mmol/L    Anion Gap 6 (L) 7 - 15 mmol/L    Urea Nitrogen 24.6 (H) 8.0 - 23.0 mg/dL    Creatinine 0.88 0.67 - 1.17 mg/dL    GFR Estimate 84 >60 mL/min/1.73m2    Calcium 8.9 8.8 - 10.2 mg/dL    Chloride 107 98 - 107 mmol/L    Glucose 96 70 - 99 mg/dL    Alkaline Phosphatase 64 40 - 150 U/L    AST 32 0 - 45 U/L    ALT 15 0 - 70 U/L    Protein Total 7.0 6.4 - 8.3 g/dL    Albumin 3.7 3.5 - 5.2 g/dL    Bilirubin Total 0.4 <=1.2 mg/dL   Result Value Ref Range    CRP Inflammation 9.00 (H) <5.00 mg/L   Erythrocyte sedimentation rate auto   Result Value Ref Range    Erythrocyte Sedimentation Rate 21 (H) 0 - 20 mm/hr   Lactic acid whole blood   Result Value Ref Range    Lactic Acid 0.9 0.7 - 2.0 mmol/L   CBC with platelets and differential   Result Value Ref Range    WBC Count 7.1 4.0 - 11.0 10e3/uL    RBC Count 2.90 (L) 4.40 - 5.90 10e6/uL    Hemoglobin 9.1 (L) 13.3 - 17.7 g/dL    Hematocrit 28.5 (L) 40.0 - 53.0 %    MCV 98 78 - 100 fL    MCH 31.4 26.5 - 33.0 pg    MCHC 31.9 31.5 - 36.5 g/dL    RDW 14.5 10.0 - 15.0 %    Platelet Count 211 150 - 450 10e3/uL    % Neutrophils 77 %    % Lymphocytes 16 %    % Monocytes 6 %    % Eosinophils 1 %    % Basophils 0 %    % Immature Granulocytes 0 %    NRBCs per 100 WBC 0 <1 /100    Absolute Neutrophils 5.5 1.6 - 8.3 10e3/uL    Absolute Lymphocytes 1.1 0.8 - 5.3 10e3/uL    Absolute Monocytes 0.4 0.0 - 1.3 10e3/uL    Absolute Eosinophils 0.0 0.0 - 0.7 10e3/uL    Absolute Basophils 0.0 0.0 - 0.2 10e3/uL    Absolute Immature Granulocytes 0.0 <=0.4 10e3/uL    Absolute NRBCs 0.0 10e3/uL   Extra Blood Culture Bottle   Result Value Ref Range    Hold Specimen JIC    Extra Blue Top Tube   Result Value Ref Range    Hold Specimen JIC    Extra Blood Culture Bottle   Result Value Ref Range    Hold Specimen JIC        RADIOLOGY:  Reviewed all pertinent imaging. Please see official radiology report.  XR Hand Right G/E 3 Views   Final Result   IMPRESSION:  Considerable soft tissue swelling is seen along the distal aspect of the right long finger particularly near the DIP joint. Anatomic alignment right hand. No acute displaced fracture. Scattered mild arthritic change in the hand and wrist. No    definitive radiographic finding for acute osteomyelitis with particular focus at the distal aspect of the right long finger.        PROCEDURES:   PROCEDURE: Digital Block   INDICATIONS: Right middle finger paronychia drainage   PROCEDURE PROVIDER: Dr Annia Mcneill   SITE: Right middle finger (3rd digit)   MEDICATION: 4 mL of 1% Lidocaine without epinephrine   NOTE: The skin overlying the site for injection was prepped with chlorhexidine.  Needle was inserted in a standard three point injection pattern.  Each time the area was aspirated and there was no return of blood.  I then injected the medication at the base of the digit.  The patient had good response to the procedure   COMPLICATIONS: Patient tolerated procedure well, without complication      PROCEDURE: Incision and Drainage   INDICATIONS: Right middle finger paronychia    PROCEDURE PROVIDER: Dr Annia Mcneill   SITE: Right middle finger   MEDICATION: 4 mLs of 1% Lidocaine without epinephrine - digital block as above   NOTE: The area was prepped with chlorhexidine and draped off in the usual sterile fashion.  Digital block performed as above. The area of maximal fluctuance was opened with a #10 using a Single Straight incision to allow for drainage.  The abscess was drained. A sterile dressing was placed over the area.   COMPLEXITY: Simple    Simple = single, furuncle, paronychia, superficial  Complex = multiple or abscess requiring probing, loculations, packing placement   COMPLICATIONS: Patient tolerated procedure well, without complication          [facility] Documentation:   CMS Diagnoses:               Annia Mcneill MD  [facility] EMERGENCY DEPARTMENT  [id number]  [facility] [address]  [phone]       Annia Mcneill MD  01/29/24 0117

## 2024-01-29 NOTE — ED TRIAGE NOTES
Pt presents to triage via wheel chair with wife. Pt has had right middle finger swelling, discoloration, and pain for the past 3-4 days. Pt was started on antibiotics last night. Has been running fevers at home, up to 101.     Triage Assessment (Adult)       Row Name 01/29/24 1051          Triage Assessment    Airway WDL WDL        Respiratory WDL    Respiratory WDL WDL        Skin Circulation/Temperature WDL    Skin Circulation/Temperature WDL WDL        Cardiac WDL    Cardiac WDL WDL        Peripheral/Neurovascular WDL    Peripheral Neurovascular WDL WDL        Cognitive/Neuro/Behavioral WDL    Cognitive/Neuro/Behavioral WDL WDL